# Patient Record
Sex: FEMALE | Race: BLACK OR AFRICAN AMERICAN | NOT HISPANIC OR LATINO | Employment: FULL TIME | URBAN - METROPOLITAN AREA
[De-identification: names, ages, dates, MRNs, and addresses within clinical notes are randomized per-mention and may not be internally consistent; named-entity substitution may affect disease eponyms.]

---

## 2018-10-03 ENCOUNTER — OFFICE VISIT (OUTPATIENT)
Dept: OBSTETRICS AND GYNECOLOGY | Facility: CLINIC | Age: 43
End: 2018-10-03
Attending: OBSTETRICS & GYNECOLOGY
Payer: COMMERCIAL

## 2018-10-03 VITALS
SYSTOLIC BLOOD PRESSURE: 100 MMHG | WEIGHT: 202.63 LBS | DIASTOLIC BLOOD PRESSURE: 68 MMHG | HEIGHT: 68 IN | BODY MASS INDEX: 30.71 KG/M2

## 2018-10-03 DIAGNOSIS — Z01.419 WELL FEMALE EXAM WITH ROUTINE GYNECOLOGICAL EXAM: Primary | ICD-10-CM

## 2018-10-03 PROCEDURE — 88175 CYTOPATH C/V AUTO FLUID REDO: CPT

## 2018-10-03 PROCEDURE — 99386 PREV VISIT NEW AGE 40-64: CPT | Mod: S$GLB,,, | Performed by: OBSTETRICS & GYNECOLOGY

## 2018-10-03 PROCEDURE — 99999 PR PBB SHADOW E&M-EST. PATIENT-LVL III: CPT | Mod: PBBFAC,,, | Performed by: OBSTETRICS & GYNECOLOGY

## 2018-10-03 RX ORDER — GABAPENTIN 600 MG/1
600 TABLET ORAL 3 TIMES DAILY
COMMUNITY
End: 2021-05-25

## 2018-10-03 RX ORDER — CHOLESTYRAMINE 4 G/4.8G
POWDER, FOR SUSPENSION ORAL
COMMUNITY
End: 2019-03-08

## 2018-10-03 RX ORDER — TERCONAZOLE 8 MG/G
1 CREAM VAGINAL NIGHTLY
Qty: 20 G | Refills: 0 | Status: SHIPPED | OUTPATIENT
Start: 2018-10-03 | End: 2018-10-10

## 2018-10-03 NOTE — PROGRESS NOTES
SUBJECTIVE:   43 y.o. female   for routine gyn exam. No LMP recorded. Patient is not currently having periods (Reason: Birth Control)..  She has no unusual complaints.  Needs Nexplanon replaced next month.          Past Medical History:   Diagnosis Date    Ankle pain     Low back pain      Past Surgical History:   Procedure Laterality Date    CHOLECYSTECTOMY       Social History     Socioeconomic History    Marital status: Single     Spouse name: Not on file    Number of children: Not on file    Years of education: Not on file    Highest education level: Not on file   Social Needs    Financial resource strain: Not on file    Food insecurity - worry: Not on file    Food insecurity - inability: Not on file    Transportation needs - medical: Not on file    Transportation needs - non-medical: Not on file   Occupational History    Not on file   Tobacco Use    Smoking status: Never Smoker    Smokeless tobacco: Never Used   Substance and Sexual Activity    Alcohol use: Yes     Comment: social    Drug use: No    Sexual activity: Not Currently     Birth control/protection: Implant     Comment: Implant placement-2015   Other Topics Concern    Not on file   Social History Narrative    Not on file     Family History   Problem Relation Age of Onset    Breast cancer Neg Hx     Ovarian cancer Neg Hx     Colon cancer Neg Hx      OB History    Para Term  AB Living   2 2       2   SAB TAB Ectopic Multiple Live Births           2      # Outcome Date GA Lbr Dileep/2nd Weight Sex Delivery Anes PTL Lv   2 Para            1 Para                     Current Outpatient Medications   Medication Sig Dispense Refill    cholestyramine-aspartame (CHOLESTYRAMINE LIGHT) 4 gram PwPk Take by mouth.      gabapentin (NEURONTIN) 600 MG tablet Take 600 mg by mouth 3 (three) times daily.      naproxen sodium (ANAPROX) 220 MG tablet Take 220 mg by mouth every 12 (twelve) hours.      terconazole (TERAZOL 3)  "0.8 % vaginal cream Place 1 applicator vaginally every evening. for 7 days 20 g 0     No current facility-administered medications for this visit.      Allergies: Patient has no known allergies.     ROS:  Constitutional: no weight loss, weight gain, fever, fatigue  Eyes:  No vision changes, glasses/contacts  ENT/Mouth: No ulcers, sinus problems, ears ringing, headache  Cardiovascular: No inability to lie flat, chest pain, exercise intolerance, swelling, heart palpitations  Respiratory: No wheezing, coughing blood, shortness of breath, or cough  Gastrointestinal: No diarrhea, bloody stool, nausea/vomiting, constipation, gas, hemorrhoids  Genitourinary: No blood in urine, painful urination, urgency of urination, frequency of urination, incomplete emptying, incontinence, abnormal bleeding, painful periods, heavy periods, vaginal discharge, vaginal odor, painful intercourse, sexual problems, bleeding after intercourse.  Musculoskeletal: No muscle weakness  Skin/Breast: No painful breasts, nipple discharge, masses, rash, ulcers  Neurological: No passing out, seizures, numbness, headache  Endocrine: No diabetes, hypothyroid, hyperthyroid, hot flashes, hair loss, abnormal hair growth, ance  Psychiatric: No depression, crying  Hematologic: No bruises, bleeding, swollen lymph nodes, anemia.      OBJECTIVE:   The patient appears well, alert, oriented x 3, in no distress.  /68 (BP Location: Left arm, Patient Position: Sitting, BP Method: Large (Manual))   Ht 5' 8" (1.727 m)   Wt 91.9 kg (202 lb 9.6 oz)   BMI 30.81 kg/m²   NECK: no thyromegaly, trachea midline  SKIN: no acne, striae, hirsutism  CHEST: CTAB  CV: RRR  BREAST EXAM: breasts appear normal, no suspicious masses, no skin or nipple changes or axillary nodes  ABDOMEN: no hernias, masses, or hepatosplenomegaly  GENITALIA: normal external genitalia, no erythema, white discharge  URETHRA: normal urethra, normal urethral meatus  VAGINA: Normal  CERVIX: no lesions " or cervical motion tenderness  UTERUS: normal size, contour, position, consistency, mobility, non-tender  ADNEXA: no mass, fullness, tenderness      ASSESSMENT:   1. Well female exam with routine gynecological exam  Liquid-based pap smear, screening       PLAN:   Orders Placed This Encounter    Liquid-based pap smear, screening    terconazole (TERAZOL 3) 0.8 % vaginal cream     Discussed white d/c c/w candidiasis.  Discussed Depo Provera vs Nexplanon.    Return to clinic in 1 year

## 2018-10-04 ENCOUNTER — TELEPHONE (OUTPATIENT)
Dept: OBSTETRICS AND GYNECOLOGY | Facility: CLINIC | Age: 43
End: 2018-10-04

## 2018-10-04 NOTE — TELEPHONE ENCOUNTER
----- Message from Melody Dominguez sent at 10/4/2018  2:48 PM CDT -----  Contact: pt   Name of Who is Calling: CHUCHO CHICAS [[id number]]    What is the request in detail: Patient is returning a call....Please contact to further discuss and advise      Can the clinic reply by [facility]: No   What Number to Call Back if not in [facility]: [phone].

## 2018-10-04 NOTE — TELEPHONE ENCOUNTER
----- Message from Gabriela Celeste sent at 10/4/2018 12:12 PM CDT -----            Name of Who is Calling: CHUCHO CHICAS [[id number]]      What is the request in detail: PT is returning a call from the office. She states she received a voicemail stating she needed to schedule an appt, but she doesn't know what for. Please call to discuss.      Can the clinic reply by [facility]: yes      What Number to Call Back if not in [facility]: [phone]

## 2018-10-08 ENCOUNTER — TELEPHONE (OUTPATIENT)
Dept: OBSTETRICS AND GYNECOLOGY | Facility: CLINIC | Age: 43
End: 2018-10-08

## 2018-10-08 NOTE — TELEPHONE ENCOUNTER
Patients reports she received a 3 treatment of Terazol cream , but is feeling better. Advised to call the office if odor re occur.

## 2018-10-08 NOTE — TELEPHONE ENCOUNTER
----- Message from Corina Castro sent at 10/8/2018  1:08 PM CDT -----  Contact: pt   Can the clinic reply in [facility]: no       Please refill the medication(s) listed below. Please call the patient when the prescription(s) is ready for  at the phone number [phone]    Medication #1:terconazole (TERAZOL 3) 0.8 % vaginal cream    Medication #2:       Preferred Pharmacy:[id number] - [place] - [address]

## 2018-11-15 ENCOUNTER — PROCEDURE VISIT (OUTPATIENT)
Dept: OBSTETRICS AND GYNECOLOGY | Facility: CLINIC | Age: 43
End: 2018-11-15
Payer: COMMERCIAL

## 2018-11-15 ENCOUNTER — TELEPHONE (OUTPATIENT)
Dept: OBSTETRICS AND GYNECOLOGY | Facility: CLINIC | Age: 43
End: 2018-11-15

## 2018-11-15 VITALS
DIASTOLIC BLOOD PRESSURE: 88 MMHG | BODY MASS INDEX: 31.37 KG/M2 | WEIGHT: 207 LBS | HEIGHT: 68 IN | SYSTOLIC BLOOD PRESSURE: 128 MMHG

## 2018-11-15 DIAGNOSIS — Z30.9 ENCOUNTER FOR CONTRACEPTIVE MANAGEMENT, UNSPECIFIED TYPE: Primary | ICD-10-CM

## 2018-11-15 DIAGNOSIS — Z30.46 NEXPLANON REMOVAL: Primary | ICD-10-CM

## 2018-11-15 PROCEDURE — 11982 REMOVE DRUG IMPLANT DEVICE: CPT | Mod: S$GLB,,, | Performed by: OBSTETRICS & GYNECOLOGY

## 2018-11-15 RX ORDER — LIDOCAINE HYDROCHLORIDE 38.8 MG/G
CREAM TOPICAL
Refills: 3 | COMMUNITY
Start: 2018-09-14 | End: 2021-02-19

## 2018-11-15 RX ORDER — CHOLESTYRAMINE 4 G/9G
POWDER, FOR SUSPENSION ORAL
Refills: 2 | COMMUNITY
Start: 2018-11-10 | End: 2021-02-19

## 2018-11-15 RX ORDER — AMITRIPTYLINE HYDROCHLORIDE 25 MG/1
TABLET, FILM COATED ORAL
COMMUNITY
Start: 2018-10-11 | End: 2019-03-08

## 2018-11-15 RX ORDER — CAMPHOR, LIDOCAINE, AND METHYL SALICYLATE 2.5; 2; 4 MG/100MG; MG/100MG; MG/100MG
PATCH TOPICAL
Refills: 3 | COMMUNITY
Start: 2018-09-14 | End: 2021-02-19

## 2018-11-15 NOTE — PROCEDURES
Removal of Nexplanon Device  Date/Time: 11/15/2018 2:54 PM  Performed by: Lucas Webber Jr., MD  Authorized by: Lucas Webber Jr., MD   Preparation: Patient was prepped and draped in the usual sterile fashion.  Local anesthesia used: yes    Anesthesia:  Local anesthesia used: yes  Local Anesthetic: lidocaine 1% without epinephrine  Anesthetic total: 1.5 mL    Sedation:  Patient sedated: no    Patient tolerance: Patient tolerated the procedure well with no immediate complications          Gloria Chew is a 43 y.o. female  presents for implanon removal and Nexplanon placement.  It is time to have the Implanon replaced.  UPT negative.    Implanon palpated through the skin.  Area wiped with rubbing alcohol.  1.5  cc of 1% lidocaine without epinephrine was injected in the subcutaneous tissue.  The area was prepped with betadine.  A stabbing incision was made with an 11 blade scalpel.  The Implanon was identified and grasped with curved hemostat.  It was removed intact.  A bandage was placed over the incision site.

## 2018-11-16 ENCOUNTER — HOSPITAL ENCOUNTER (EMERGENCY)
Facility: OTHER | Age: 43
Discharge: HOME | End: 2018-11-16
Attending: EMERGENCY MEDICINE
Payer: COMMERCIAL

## 2018-11-16 VITALS
SYSTOLIC BLOOD PRESSURE: 126 MMHG | WEIGHT: 205 LBS | RESPIRATION RATE: 18 BRPM | TEMPERATURE: 98 F | HEART RATE: 73 BPM | BODY MASS INDEX: 31.07 KG/M2 | OXYGEN SATURATION: 100 % | DIASTOLIC BLOOD PRESSURE: 71 MMHG | HEIGHT: 68 IN

## 2018-11-16 DIAGNOSIS — N39.0 URINARY TRACT INFECTION WITHOUT HEMATURIA, SITE UNSPECIFIED: ICD-10-CM

## 2018-11-16 DIAGNOSIS — D25.9 UTERINE LEIOMYOMA, UNSPECIFIED LOCATION: ICD-10-CM

## 2018-11-16 DIAGNOSIS — R93.2 ABNORMAL ULTRASOUND OF LIVER: Primary | ICD-10-CM

## 2018-11-16 LAB
ALBUMIN SERPL BCP-MCNC: 3.7 G/DL
ALP SERPL-CCNC: 71 U/L
ALT SERPL W/O P-5'-P-CCNC: 26 U/L
ANION GAP SERPL CALC-SCNC: 9 MMOL/L
AST SERPL-CCNC: 28 U/L
B-HCG UR QL: NEGATIVE
BACTERIA #/AREA URNS HPF: ABNORMAL /HPF
BASOPHILS # BLD AUTO: 0.02 K/UL
BASOPHILS NFR BLD: 0.3 %
BILIRUB SERPL-MCNC: 0.8 MG/DL
BILIRUB UR QL STRIP: NEGATIVE
BUN SERPL-MCNC: 11 MG/DL
CALCIUM SERPL-MCNC: 9.3 MG/DL
CHLORIDE SERPL-SCNC: 106 MMOL/L
CLARITY UR: CLEAR
CO2 SERPL-SCNC: 24 MMOL/L
COLOR UR: YELLOW
CREAT SERPL-MCNC: 0.9 MG/DL
CTP QC/QA: YES
DIFFERENTIAL METHOD: NORMAL
EOSINOPHIL # BLD AUTO: 0.4 K/UL
EOSINOPHIL NFR BLD: 6.4 %
ERYTHROCYTE [DISTWIDTH] IN BLOOD BY AUTOMATED COUNT: 12.9 %
EST. GFR  (AFRICAN AMERICAN): >60 ML/MIN/1.73 M^2
EST. GFR  (NON AFRICAN AMERICAN): >60 ML/MIN/1.73 M^2
GLUCOSE SERPL-MCNC: 89 MG/DL
GLUCOSE UR QL STRIP: NEGATIVE
HCT VFR BLD AUTO: 39.7 %
HGB BLD-MCNC: 13.3 G/DL
HGB UR QL STRIP: ABNORMAL
KETONES UR QL STRIP: NEGATIVE
LEUKOCYTE ESTERASE UR QL STRIP: ABNORMAL
LIPASE SERPL-CCNC: 11 U/L
LYMPHOCYTES # BLD AUTO: 1.9 K/UL
LYMPHOCYTES NFR BLD: 31.7 %
MCH RBC QN AUTO: 30.6 PG
MCHC RBC AUTO-ENTMCNC: 33.5 G/DL
MCV RBC AUTO: 92 FL
MICROSCOPIC COMMENT: ABNORMAL
MONOCYTES # BLD AUTO: 0.3 K/UL
MONOCYTES NFR BLD: 5.1 %
NEUTROPHILS # BLD AUTO: 3.4 K/UL
NEUTROPHILS NFR BLD: 56.5 %
NITRITE UR QL STRIP: NEGATIVE
PH UR STRIP: 6 [PH] (ref 5–8)
PLATELET # BLD AUTO: 292 K/UL
PMV BLD AUTO: 9.4 FL
POTASSIUM SERPL-SCNC: 4.8 MMOL/L
PROT SERPL-MCNC: 7.9 G/DL
PROT UR QL STRIP: NEGATIVE
RBC # BLD AUTO: 4.34 M/UL
RBC #/AREA URNS HPF: 2 /HPF (ref 0–4)
SODIUM SERPL-SCNC: 139 MMOL/L
SP GR UR STRIP: >=1.03 (ref 1–1.03)
SQUAMOUS #/AREA URNS HPF: 4 /HPF
URN SPEC COLLECT METH UR: ABNORMAL
UROBILINOGEN UR STRIP-ACNC: NEGATIVE EU/DL
WBC # BLD AUTO: 6.06 K/UL
WBC #/AREA URNS HPF: 13 /HPF (ref 0–5)
WBC CLUMPS URNS QL MICRO: ABNORMAL

## 2018-11-16 PROCEDURE — 87086 URINE CULTURE/COLONY COUNT: CPT

## 2018-11-16 PROCEDURE — 83690 ASSAY OF LIPASE: CPT

## 2018-11-16 PROCEDURE — 99284 EMERGENCY DEPT VISIT MOD MDM: CPT | Mod: 25

## 2018-11-16 PROCEDURE — 63600175 PHARM REV CODE 636 W HCPCS: Performed by: PHYSICIAN ASSISTANT

## 2018-11-16 PROCEDURE — 81000 URINALYSIS NONAUTO W/SCOPE: CPT

## 2018-11-16 PROCEDURE — 80053 COMPREHEN METABOLIC PANEL: CPT

## 2018-11-16 PROCEDURE — 96374 THER/PROPH/DIAG INJ IV PUSH: CPT

## 2018-11-16 PROCEDURE — 85025 COMPLETE CBC W/AUTO DIFF WBC: CPT

## 2018-11-16 PROCEDURE — 81025 URINE PREGNANCY TEST: CPT | Performed by: EMERGENCY MEDICINE

## 2018-11-16 RX ORDER — KETOROLAC TROMETHAMINE 30 MG/ML
15 INJECTION, SOLUTION INTRAMUSCULAR; INTRAVENOUS
Status: COMPLETED | OUTPATIENT
Start: 2018-11-16 | End: 2018-11-16

## 2018-11-16 RX ORDER — CEPHALEXIN 500 MG/1
500 CAPSULE ORAL 4 TIMES DAILY
Qty: 28 CAPSULE | Refills: 0 | Status: SHIPPED | OUTPATIENT
Start: 2018-11-16 | End: 2018-11-23

## 2018-11-16 RX ADMIN — KETOROLAC TROMETHAMINE 15 MG: 30 INJECTION, SOLUTION INTRAMUSCULAR at 09:11

## 2018-11-16 NOTE — ED PROVIDER NOTES
Encounter Date: 11/16/2018       History     Chief Complaint   Patient presents with    Pelvic Pain     pelvic pain x 1 wk off and on. worse this morning. had nexplanon removed yesterday. denies discharge. no periords while on nexplanon     Patient is 43 year old female history of known uterine fibroids who presents with complaints abdominal pain that has been present for 1 week prior to arrival.  She reports pain has been progressively worsening.  She reports that she had a pelvic ultrasound about 2 weeks ago that revealed about 7 fibroids but reports that she has not had a chance to follow up with her OBGYN yet.  She reports normal bowel movements with last passage of stool today.  Reports normal urination with no dysuria hematuria or vaginal discharge. Reports no nausea or vomiting fever or chills skin rashes abdominal trauma or injury. She has been taking occasional doses of ibuprofen but reports she has not taken anything yesterday or today.  She has no associated bleeding.  She is currently unaccompanied in the ER.          Review of patient's allergies indicates:  No Known Allergies  Past Medical History:   Diagnosis Date    Ankle pain     Low back pain      Past Surgical History:   Procedure Laterality Date    CHOLECYSTECTOMY      INSERTION OF CONTRACEPTIVE CAPSULE  2012 / 2015    NEXPLANON REMOVED     Family History   Problem Relation Age of Onset    Breast cancer Neg Hx     Ovarian cancer Neg Hx     Colon cancer Neg Hx      Social History     Tobacco Use    Smoking status: Never Smoker    Smokeless tobacco: Never Used   Substance Use Topics    Alcohol use: Yes     Comment: social    Drug use: No     Review of Systems   Constitutional: Negative for fever.   HENT: Negative for sore throat.    Respiratory: Negative for shortness of breath.    Cardiovascular: Negative for chest pain.   Gastrointestinal: Positive for abdominal pain. Negative for nausea.   Genitourinary: Negative for dysuria.    Musculoskeletal: Negative for back pain.   Skin: Negative for rash.   Neurological: Negative for weakness.   Hematological: Does not bruise/bleed easily.       Physical Exam     Initial Vitals [11/16/18 0847]   BP Pulse Resp Temp SpO2   135/79 79 18 98.4 °F (36.9 °C) 98 %      MAP       --         Physical Exam    Nursing note and vitals reviewed.  Constitutional: She appears well-developed and well-nourished. She is not diaphoretic. No distress.   Healthy-appearing female in no acute distress but does appear uncomfortable on interview and exam especially when transitioning from seated position in supine position.  She makes good eye contact, speaks in clear full sentences and ambulates with ease.   HENT:   Head: Normocephalic and atraumatic.   Eyes: Conjunctivae and EOM are normal. Pupils are equal, round, and reactive to light. Right eye exhibits no discharge. Left eye exhibits no discharge. No scleral icterus.   Neck: Normal range of motion.   Cardiovascular: Normal rate, regular rhythm, normal heart sounds and intact distal pulses. Exam reveals no gallop and no friction rub.    No murmur heard.  Pulmonary/Chest: Breath sounds normal. She has no wheezes. She has no rhonchi. She has no rales.   Clear lungs auscultation bilaterally   Abdominal: Soft. Bowel sounds are normal. There is tenderness. There is no rebound and no guarding.   Generalized lower abdominal tenderness to palpation with significant right upper quadrant tenderness to deep palpation. No clear Diez sign. No abdominal distension adventitious bowel sounds or overlying skin changes.   Musculoskeletal: Normal range of motion. She exhibits no edema or tenderness.   Lymphadenopathy:     She has no cervical adenopathy.   Neurological: She is alert and oriented to person, place, and time. She has normal strength. GCS score is 15. GCS eye subscore is 4. GCS verbal subscore is 5. GCS motor subscore is 6.   Skin: Skin is warm. Capillary refill takes less  than 2 seconds. No rash and no abscess noted. No erythema.   Psychiatric: She has a normal mood and affect. Her behavior is normal. Thought content normal.         ED Course   Procedures  Labs Reviewed   URINALYSIS, REFLEX TO URINE CULTURE   URINALYSIS MICROSCOPIC   POCT URINE PREGNANCY         Labs Reviewed   URINALYSIS, REFLEX TO URINE CULTURE - Abnormal; Notable for the following components:       Result Value    Specific Gravity, UA >=1.030 (*)     Occult Blood UA 1+ (*)     Leukocytes, UA Trace (*)     All other components within normal limits    Narrative:     Preferred Collection Type->Urine, Clean Catch   URINALYSIS MICROSCOPIC - Abnormal; Notable for the following components:    WBC, UA 13 (*)     WBC Clumps, UA Occasional (*)     Bacteria, UA Few (*)     All other components within normal limits    Narrative:     Preferred Collection Type->Urine, Clean Catch   CULTURE, URINE   CULTURE, URINE   CBC W/ AUTO DIFFERENTIAL   COMPREHENSIVE METABOLIC PANEL   LIPASE   POCT URINE PREGNANCY     Imaging Results          US Pelvis Comp with Transvag NON-OB (xpd (Final result)  Result time 11/16/18 11:37:14    Final result by Bev Robertson MD (11/16/18 11:37:14)                 Impression:      Several uterine fibroids.    If patient is symptomatic in this region, in 8-12 week ultrasound follow-up could be performed.  If patient is asymptomatic, no follow-up is necessary.      Electronically signed by: Bev Roebrtson MD  Date:    11/16/2018  Time:    11:37             Narrative:    EXAMINATION:  US PELVIS COMP WITH TRANSVAG NON-OB (XPD)    CLINICAL HISTORY:  abdomina pain;    TECHNIQUE:  Transabdominal sonography of the pelvis was performed, followed by transvaginal sonography to better evaluate the uterus and ovaries.    COMPARISON:  None.    FINDINGS:  The uterus is normal in size and measures 7 x 5.6 x 6 cm.  The endometrial stripe is normal and measures 6mm.  There are 3 uterine fibroids.  Along the posterior  body of the uterus is a subserosal 2 x 2.7 x 2.4 cm fibroid, along the posterior body of the uterus is a 2.7 x 2.4 x 2.6 cm subserosal fibroid and there is a 3.4 by 2.4 x 2.1 cm intramural fibroid along the anterior body of the uterus.    The right ovary measures 2.9 x 1.8 x 1.1 cm.   The left ovary measures 5.4 x 2.6 x 3.5cm.  The left ovary contains a 4 cm simple cyst.  There is appropriate flow in the ovaries.                               US Abdomen Limited (Final result)     Abnormal  Result time 11/16/18 11:13:43    Final result by Blaze Bravo MD (11/16/18 11:13:43)                 Impression:      3.6 cm hyperechoic lesion in the left hepatic lobe.  This is thought to likely reflect a hemangioma, however further characterization is suggested with a triple phase liver protocol CT or MRI.    Increased periportal echogenicity.  This is a nonspecific finding, but could indicate underlying hepatitis.  Correlation with laboratory values.    Prior cholecystectomy.    This report was flagged in Epic as abnormal.      Electronically signed by: Blaze Bravo MD  Date:    11/16/2018  Time:    11:13             Narrative:    EXAMINATION:  US ABDOMEN LIMITED    CLINICAL HISTORY:  RUQ pain;    TECHNIQUE:  Limited ultrasound of the right upper quadrant of the abdomen (including pancreas, liver, gallbladder, common bile duct, and right kidney) was performed.    COMPARISON:  None.    FINDINGS:  Liver: Mildly prominent size measuring 20 cm.  3.6 cm well-circumscribed slightly hyperechoic lesion along the periphery of the inferior left hepatic lobe.  No significant mass effect.  No additional focal hepatic lesions identified elsewhere.  Increased periportal echogenicity.    Gallbladder: Prior cholecystectomy    Biliary system: The common duct is not dilated, measuring 3 mm.  No intrahepatic ductal dilatation.    Pancreas: Visualized portions are unremarkable.    Right kidney: Normal in size with no hydronephrosis,  measuring 10 cm.    Miscellaneous: No upper abdominal ascites.                                     Medical Decision Making:   ED Management:  Urgent evaluation a 43-year-old female who presents with complaints of generalized abdominal pain without clear etiology at this time.  She makes good eye contact, speaks in clear full sentences and ambulates with ease.  Right upper quadrant tenderness to deep palpation without acute peritoneal signs on exam.  Also with generalized lower abdominal tenderness to palpation nose specific adnexal tenderness.  Right upper quadrant and pelvic ultrasounds pending as well as diagnostic labs.  Will give dose of Toradol and continue to follow.    Update:  Pelvic ultrasound reveals uterine fibroids.  Evidence of UTI on urinalysis will cover with Keflex urine culture is sent.  Right upper quadrant ultrasound reveals absent gallbladder with 3.6 cm liver lesion most consistent with hemangioma however further imaging is recommended which can be obtained in the outpatient setting.  There is no evidence of elevated LFTs on CMP.  Lipase is within normal range.  Anti-inflammatories improved patient's pain throughout ED stay.  Discussed case with attending MD who agrees that urgent GI or hepatology consult not felt warranted. She is felt safe for discharge with instruction on follow-up information.  Will have her follow up with Gastroenterology for liver evaluation, OBGYN for fibroid evaluation and primary care provider for urinalysis recheck after antibiotics.  She is educated on ED return precautions verbalized understanding.  She is stable for discharge.  Other:   I have discussed this case with another health care provider.       <> Summary of the Discussion: Richy                       Clinical Impression:   The primary encounter diagnosis was Abnormal ultrasound of liver. Diagnoses of Uterine leiomyoma, unspecified location and Urinary tract infection without hematuria, site unspecified  were also pertinent to this visit.                             Whit Escobar PA-C  11/16/18 2677

## 2018-11-16 NOTE — ED TRIAGE NOTES
Pt c/o pelvic pain that radiates to mitchell back area for the past week. She denies vag discharge, denies any urinary s/s. Denies N/V/D

## 2018-11-17 LAB
BACTERIA UR CULT: NORMAL
BACTERIA UR CULT: NORMAL

## 2018-11-26 ENCOUNTER — TELEPHONE (OUTPATIENT)
Dept: OBSTETRICS AND GYNECOLOGY | Facility: CLINIC | Age: 43
End: 2018-11-26

## 2018-11-26 ENCOUNTER — HOSPITAL ENCOUNTER (EMERGENCY)
Facility: OTHER | Age: 43
Discharge: HOME | End: 2018-11-26
Attending: EMERGENCY MEDICINE
Payer: COMMERCIAL

## 2018-11-26 VITALS
WEIGHT: 205 LBS | DIASTOLIC BLOOD PRESSURE: 76 MMHG | SYSTOLIC BLOOD PRESSURE: 129 MMHG | OXYGEN SATURATION: 100 % | BODY MASS INDEX: 31.07 KG/M2 | HEART RATE: 60 BPM | RESPIRATION RATE: 16 BRPM | TEMPERATURE: 97 F | HEIGHT: 68 IN

## 2018-11-26 DIAGNOSIS — D21.9 FIBROIDS: ICD-10-CM

## 2018-11-26 DIAGNOSIS — R10.2 PELVIC PAIN: Primary | ICD-10-CM

## 2018-11-26 LAB
B-HCG UR QL: NEGATIVE
BACTERIA #/AREA URNS HPF: ABNORMAL /HPF
BASOPHILS # BLD AUTO: 0.02 K/UL
BASOPHILS NFR BLD: 0.3 %
BILIRUB UR QL STRIP: NEGATIVE
CLARITY UR: CLEAR
COLOR UR: YELLOW
CTP QC/QA: YES
DIFFERENTIAL METHOD: ABNORMAL
EOSINOPHIL # BLD AUTO: 0.3 K/UL
EOSINOPHIL NFR BLD: 5.4 %
ERYTHROCYTE [DISTWIDTH] IN BLOOD BY AUTOMATED COUNT: 12.8 %
GLUCOSE UR QL STRIP: NEGATIVE
HCT VFR BLD AUTO: 37.2 %
HGB BLD-MCNC: 11.9 G/DL
HGB UR QL STRIP: NEGATIVE
KETONES UR QL STRIP: NEGATIVE
LEUKOCYTE ESTERASE UR QL STRIP: ABNORMAL
LYMPHOCYTES # BLD AUTO: 2.3 K/UL
LYMPHOCYTES NFR BLD: 37.3 %
MCH RBC QN AUTO: 30.1 PG
MCHC RBC AUTO-ENTMCNC: 32 G/DL
MCV RBC AUTO: 94 FL
MICROSCOPIC COMMENT: ABNORMAL
MONOCYTES # BLD AUTO: 0.4 K/UL
MONOCYTES NFR BLD: 5.9 %
NEUTROPHILS # BLD AUTO: 3.1 K/UL
NEUTROPHILS NFR BLD: 50.9 %
NITRITE UR QL STRIP: NEGATIVE
PH UR STRIP: 6 [PH] (ref 5–8)
PLATELET # BLD AUTO: 285 K/UL
PMV BLD AUTO: 9.6 FL
PROT UR QL STRIP: NEGATIVE
RBC # BLD AUTO: 3.95 M/UL
RBC #/AREA URNS HPF: 0 /HPF (ref 0–4)
SP GR UR STRIP: >=1.03 (ref 1–1.03)
SQUAMOUS #/AREA URNS HPF: 10 /HPF
URN SPEC COLLECT METH UR: ABNORMAL
UROBILINOGEN UR STRIP-ACNC: NEGATIVE EU/DL
WBC # BLD AUTO: 6.11 K/UL
WBC #/AREA URNS HPF: 8 /HPF (ref 0–5)
WBC CLUMPS URNS QL MICRO: ABNORMAL
YEAST URNS QL MICRO: ABNORMAL

## 2018-11-26 PROCEDURE — 81025 URINE PREGNANCY TEST: CPT | Performed by: EMERGENCY MEDICINE

## 2018-11-26 PROCEDURE — 96372 THER/PROPH/DIAG INJ SC/IM: CPT

## 2018-11-26 PROCEDURE — 63600175 PHARM REV CODE 636 W HCPCS: Performed by: EMERGENCY MEDICINE

## 2018-11-26 PROCEDURE — 81000 URINALYSIS NONAUTO W/SCOPE: CPT

## 2018-11-26 PROCEDURE — 99284 EMERGENCY DEPT VISIT MOD MDM: CPT | Mod: 25

## 2018-11-26 PROCEDURE — 85025 COMPLETE CBC W/AUTO DIFF WBC: CPT

## 2018-11-26 PROCEDURE — 25000003 PHARM REV CODE 250: Performed by: EMERGENCY MEDICINE

## 2018-11-26 RX ORDER — ACETAMINOPHEN 500 MG
1000 TABLET ORAL
Status: COMPLETED | OUTPATIENT
Start: 2018-11-26 | End: 2018-11-26

## 2018-11-26 RX ORDER — KETOROLAC TROMETHAMINE 30 MG/ML
15 INJECTION, SOLUTION INTRAMUSCULAR; INTRAVENOUS
Status: DISCONTINUED | OUTPATIENT
Start: 2018-11-26 | End: 2018-11-26

## 2018-11-26 RX ORDER — IBUPROFEN 400 MG/1
800 TABLET ORAL
Status: DISCONTINUED | OUTPATIENT
Start: 2018-11-26 | End: 2018-11-26

## 2018-11-26 RX ORDER — KETOROLAC TROMETHAMINE 30 MG/ML
30 INJECTION, SOLUTION INTRAMUSCULAR; INTRAVENOUS
Status: COMPLETED | OUTPATIENT
Start: 2018-11-26 | End: 2018-11-26

## 2018-11-26 RX ORDER — HYDROCODONE BITARTRATE AND ACETAMINOPHEN 5; 325 MG/1; MG/1
1 TABLET ORAL EVERY 4 HOURS PRN
Qty: 8 TABLET | Refills: 0 | Status: SHIPPED | OUTPATIENT
Start: 2018-11-26 | End: 2018-12-06

## 2018-11-26 RX ORDER — IBUPROFEN 600 MG/1
600 TABLET ORAL EVERY 6 HOURS PRN
Qty: 20 TABLET | Refills: 0 | Status: ON HOLD | OUTPATIENT
Start: 2018-11-26 | End: 2019-03-13 | Stop reason: SDUPTHER

## 2018-11-26 RX ADMIN — KETOROLAC TROMETHAMINE 30 MG: 30 INJECTION, SOLUTION INTRAMUSCULAR at 06:11

## 2018-11-26 RX ADMIN — ACETAMINOPHEN 1000 MG: 500 TABLET, FILM COATED ORAL at 08:11

## 2018-11-26 NOTE — ED NOTES
Assumed care of pt, received BSSR from ANNALISE Garnica. Pt resting comfortably in stretcher, awakens spontaneously, states she is feeling better after receiving Toradol shot, AAOx4, calm, cooperative, responding appropriately to questions, speaking in full sentences, respirations even and unlabored, skin warm and dry, pt appears in NAD. Pt placed on continuous pulse ox and BP monitoring. Bed locked and in lowest position, call light in reach. WCTM.

## 2018-11-26 NOTE — TELEPHONE ENCOUNTER
Called to discuss scheduling appointment per Elle Guy MD from the ED. Left a message to call a schedule an appointment

## 2018-11-26 NOTE — TELEPHONE ENCOUNTER
----- Message from Elle Guy MD sent at 11/26/2018  7:54 AM CST -----  Regarding: Appointment   Good morning,     This patient presented to the ED this am with complaint of pain from fibroids. She was discharged and told to follow up with her obgyn outpatient. She said she tried to make an appointment but was unable. Could she be scheduled for an appointment in the next two weeks if possible?     Thank you!    Elle Guy MD-[id number]

## 2018-11-26 NOTE — ED PROVIDER NOTES
"Encounter Date: 11/26/2018    SCRIBE #1 NOTE: I, Ayan Espinoza, am scribing for, and in the presence of, Dr. Eli.       History     Chief Complaint   Patient presents with    Pelvic Pain     and lower back pain from fibroids, does not see her OBGYN untill 12/18, bleeding off and on since last seen here in ED     Seen by provider: 6:07 AM    Patient is a 43 y.o. female who presents to the ED with complaint of pelvic pain. Patient reports she was recently diagnosed with fibroids and states her current pain feels like the pain from her fibroids. She was seen here in the ED 10 days ago for this pain and states "I'm having the same pain that I had before." Her current pain began three days ago and she states "this morning I just couldn't deal with it anymore," prompting her visit to the ED. She reports being told to return to the ED if her pain returned. She has an appointment scheduled with her OB/GYN in 25 days, which was the earliest available appointment she could get. She describes her pain as "cramping and ellen" pain. She reports associated lower back pain. She had vaginal bleeding two days ago and light bleeding yesterday, which has since resolved. She has been taking aleve, and occasionally gabapentin with minimal relief. She last took aleve last night. She denies fevers, nausea, vomiting, diarrhea or urinary symptoms.       The history is provided by the patient.     Review of patient's allergies indicates:  No Known Allergies  Past Medical History:   Diagnosis Date    Ankle pain     Low back pain      Past Surgical History:   Procedure Laterality Date    CHOLECYSTECTOMY      INSERTION OF CONTRACEPTIVE CAPSULE  2012 / 2015    NEXPLANON REMOVED     Family History   Problem Relation Age of Onset    Breast cancer Neg Hx     Ovarian cancer Neg Hx     Colon cancer Neg Hx      Social History     Tobacco Use    Smoking status: Never Smoker    Smokeless tobacco: Never Used   Substance Use " Topics    Alcohol use: Yes     Comment: social    Drug use: No     Review of Systems   Constitutional: Negative for chills and fever.   HENT: Negative for congestion.    Respiratory: Negative for cough and shortness of breath.    Cardiovascular: Negative for chest pain.   Gastrointestinal: Negative for abdominal pain, diarrhea, nausea and vomiting.   Genitourinary: Positive for pelvic pain. Negative for dysuria, frequency, hematuria, urgency and vaginal bleeding.   Musculoskeletal: Positive for back pain.   Skin: Negative for rash.   Neurological: Negative for weakness and headaches.   Psychiatric/Behavioral: Negative for confusion.       Physical Exam     Initial Vitals [11/26/18 0542]   BP Pulse Resp Temp SpO2   (!) 141/98 70 16 97.9 °F (36.6 °C) 98 %      MAP       --         Physical Exam    Nursing note and vitals reviewed.  Constitutional: She appears well-developed and well-nourished. She is not diaphoretic. No distress.   Patient does not appear to be in any distress.   HENT:   Head: Normocephalic and atraumatic.   Eyes: Conjunctivae and EOM are normal. Pupils are equal, round, and reactive to light.   Neck: Normal range of motion. Neck supple.   Cardiovascular: Normal rate, regular rhythm and normal heart sounds. Exam reveals no gallop and no friction rub.    No murmur heard.  Pulmonary/Chest: Breath sounds normal. No respiratory distress. She has no wheezes. She has no rhonchi. She has no rales.   Abdominal: Soft. Bowel sounds are normal. She exhibits no distension. There is tenderness in the suprapubic area. There is no rebound, no guarding, no tenderness at McBurney's point and negative Diez's sign.   Abdomen is non-peritoneal.    Musculoskeletal: Normal range of motion.   Neurological: She is alert and oriented to person, place, and time.   Skin: Skin is warm and dry. No rash noted. No pallor.   Psychiatric: She has a normal mood and affect. Her behavior is normal. Thought content normal.         ED  Course   Procedures  Labs Reviewed   URINALYSIS, REFLEX TO URINE CULTURE - Abnormal; Notable for the following components:       Result Value    Specific Gravity, UA >=1.030 (*)     Leukocytes, UA Trace (*)     All other components within normal limits    Narrative:     Preferred Collection Type->Urine, Clean Catch   URINALYSIS MICROSCOPIC - Abnormal; Notable for the following components:    WBC, UA 8 (*)     All other components within normal limits    Narrative:     Preferred Collection Type->Urine, Clean Catch   CBC W/ AUTO DIFFERENTIAL - Abnormal; Notable for the following components:    RBC 3.95 (*)     Hemoglobin 11.9 (*)     All other components within normal limits   POCT URINE PREGNANCY          Imaging Results    None          Medical Decision Making:   Clinical Tests:   Lab Tests: Ordered and Reviewed  ED Management:  43-year-old female presents with pelvic pain.  No significant bleeding.  States is similar to her pain in which she was diagnosed with fibroids 10 days ago.  I reviewed those medical records.  The patient's vitals are normal. I do not see any acute abnormality on my physical exam that would necessitate any further imaging.  Will recheck a CBC give analgesia and re-evaluate.    6:55 a.m. on re-evaluation patient states the pain is improving.  Will continue to observe.    On re-evaluation patient feeling better.  I spoke with gynecology team requesting them to call the patient see if there is any sooner appointment that the patient can get in the clinic rather than return to the emergency department for her fibroids.  Discussed this with the patient who agrees to plan of care.  Will discharge home.    Patient discharged home in stable condition. Diagnosis and treatment plan explained to patient. I have answered all questions and the patient is satisfied with the plan of care. The patient demonstrates understanding of the care plan. This is the extent to the patients complaints today here in  the emergency department.            Scribe Attestation:   Scribe #1: I performed the above scribed service and the documentation accurately describes the services I performed. I attest to the accuracy of the note.    Attending Attestation:           Physician Attestation for Scribe:  Physician Attestation Statement for Scribe #1: I, Dr. Eli, reviewed documentation, as scribed by Ayan Espinoza in my presence, and it is both accurate and complete.                    Clinical Impression:     1. Pelvic pain    2. Fibroids                                Mendez Eli, DO  11/26/18 0918

## 2018-12-03 ENCOUNTER — OFFICE VISIT (OUTPATIENT)
Dept: OBSTETRICS AND GYNECOLOGY | Facility: CLINIC | Age: 43
End: 2018-12-03
Attending: OBSTETRICS & GYNECOLOGY
Payer: COMMERCIAL

## 2018-12-03 VITALS
DIASTOLIC BLOOD PRESSURE: 68 MMHG | WEIGHT: 204.56 LBS | SYSTOLIC BLOOD PRESSURE: 100 MMHG | HEIGHT: 68 IN | BODY MASS INDEX: 31 KG/M2

## 2018-12-03 DIAGNOSIS — R10.2 PELVIC PAIN: ICD-10-CM

## 2018-12-03 DIAGNOSIS — D25.9 UTERINE LEIOMYOMA, UNSPECIFIED LOCATION: Primary | ICD-10-CM

## 2018-12-03 LAB
AMORPH CRY UR QL COMP ASSIST: ABNORMAL
BILIRUB UR QL STRIP: NEGATIVE
CLARITY UR REFRACT.AUTO: ABNORMAL
COLOR UR AUTO: YELLOW
GLUCOSE UR QL STRIP: NEGATIVE
HGB UR QL STRIP: NEGATIVE
KETONES UR QL STRIP: NEGATIVE
LEUKOCYTE ESTERASE UR QL STRIP: ABNORMAL
MICROSCOPIC COMMENT: ABNORMAL
NITRITE UR QL STRIP: NEGATIVE
PH UR STRIP: 5 [PH] (ref 5–8)
PROT UR QL STRIP: NEGATIVE
RBC #/AREA URNS AUTO: 5 /HPF (ref 0–4)
SP GR UR STRIP: 1.02 (ref 1–1.03)
SQUAMOUS #/AREA URNS AUTO: 2 /HPF
URN SPEC COLLECT METH UR: ABNORMAL
WBC #/AREA URNS AUTO: 10 /HPF (ref 0–5)

## 2018-12-03 PROCEDURE — 99214 OFFICE O/P EST MOD 30 MIN: CPT | Mod: S$GLB,,, | Performed by: OBSTETRICS & GYNECOLOGY

## 2018-12-03 PROCEDURE — 3008F BODY MASS INDEX DOCD: CPT | Mod: CPTII,S$GLB,, | Performed by: OBSTETRICS & GYNECOLOGY

## 2018-12-03 PROCEDURE — 87086 URINE CULTURE/COLONY COUNT: CPT

## 2018-12-03 PROCEDURE — 81001 URINALYSIS AUTO W/SCOPE: CPT

## 2018-12-03 PROCEDURE — 99999 PR PBB SHADOW E&M-EST. PATIENT-LVL III: CPT | Mod: PBBFAC,,, | Performed by: OBSTETRICS & GYNECOLOGY

## 2018-12-04 LAB — BACTERIA UR CULT: NO GROWTH

## 2018-12-05 ENCOUNTER — OFFICE VISIT (OUTPATIENT)
Dept: PODIATRY | Facility: CLINIC | Age: 43
End: 2018-12-05
Payer: COMMERCIAL

## 2018-12-05 ENCOUNTER — PROCEDURE VISIT (OUTPATIENT)
Dept: OBSTETRICS AND GYNECOLOGY | Facility: CLINIC | Age: 43
End: 2018-12-05
Payer: COMMERCIAL

## 2018-12-05 VITALS
SYSTOLIC BLOOD PRESSURE: 128 MMHG | DIASTOLIC BLOOD PRESSURE: 74 MMHG | BODY MASS INDEX: 29.2 KG/M2 | WEIGHT: 204 LBS | HEIGHT: 70 IN | HEART RATE: 74 BPM

## 2018-12-05 VITALS — SYSTOLIC BLOOD PRESSURE: 104 MMHG | BODY MASS INDEX: 29.82 KG/M2 | DIASTOLIC BLOOD PRESSURE: 70 MMHG | WEIGHT: 205.5 LBS

## 2018-12-05 DIAGNOSIS — N89.8 VAGINA ITCHING: ICD-10-CM

## 2018-12-05 DIAGNOSIS — Z30.017 NEXPLANON INSERTION: Primary | ICD-10-CM

## 2018-12-05 DIAGNOSIS — Z97.5 NEXPLANON IN PLACE: ICD-10-CM

## 2018-12-05 DIAGNOSIS — B35.3 TINEA PEDIS OF BOTH FEET: Primary | ICD-10-CM

## 2018-12-05 LAB
B-HCG UR QL: NEGATIVE
CTP QC/QA: YES

## 2018-12-05 PROCEDURE — 99999 PR PBB SHADOW E&M-EST. PATIENT-LVL III: CPT | Mod: PBBFAC,,, | Performed by: PODIATRIST

## 2018-12-05 PROCEDURE — 81025 URINE PREGNANCY TEST: CPT | Mod: S$GLB,,, | Performed by: NURSE PRACTITIONER

## 2018-12-05 PROCEDURE — 3008F BODY MASS INDEX DOCD: CPT | Mod: CPTII,S$GLB,, | Performed by: PODIATRIST

## 2018-12-05 PROCEDURE — 11981 INSERTION DRUG DLVR IMPLANT: CPT | Mod: S$GLB,,, | Performed by: NURSE PRACTITIONER

## 2018-12-05 PROCEDURE — 99203 OFFICE O/P NEW LOW 30 MIN: CPT | Mod: S$GLB,,, | Performed by: PODIATRIST

## 2018-12-05 RX ORDER — FLUCONAZOLE 200 MG/1
200 TABLET ORAL ONCE
Qty: 3 TABLET | Refills: 1 | Status: SHIPPED | OUTPATIENT
Start: 2018-12-05 | End: 2018-12-05

## 2018-12-05 RX ORDER — CICLOPIROX 7.7 MG/G
GEL TOPICAL 2 TIMES DAILY
Qty: 100 G | Refills: 3 | Status: SHIPPED | OUTPATIENT
Start: 2018-12-05 | End: 2019-03-08

## 2018-12-05 NOTE — PROGRESS NOTES
Subjective:      Patient ID: Gloria Chew is a 43 y.o. female.    Chief Complaint: Foot Problem and Foot Pain    Burning itching, flaking skin between toes both feet.  Gradual onset, worsening over past several weeks, aggravated by increased weight bearing, shoe gear, pressure.  No previous medical treatment.  OTC pain med not helping. Denies trauma, signs infection.    Review of Systems   Constitution: Negative for chills, diaphoresis, fever, malaise/fatigue and night sweats.   Cardiovascular: Negative for claudication, cyanosis, leg swelling and syncope.   Skin: Positive for dry skin, itching and rash. Negative for color change, nail changes, suspicious lesions and unusual hair distribution.   Musculoskeletal: Negative for falls, joint pain, joint swelling, muscle cramps, muscle weakness and stiffness.   Gastrointestinal: Negative for constipation, diarrhea, nausea and vomiting.   Neurological: Negative for brief paralysis, disturbances in coordination, focal weakness, numbness, paresthesias, sensory change and tremors.           Objective:      Physical Exam   Constitutional: She is oriented to person, place, and time. She appears well-developed and well-nourished. She is cooperative. No distress.   Cardiovascular:   Pulses:       Popliteal pulses are 2+ on the right side, and 2+ on the left side.        Dorsalis pedis pulses are 2+ on the right side, and 2+ on the left side.        Posterior tibial pulses are 2+ on the right side, and 2+ on the left side.   Capillary refill 3 seconds all toes/distal feet, all toes/both feet warm to touch.      Negative lymphadenopathy bilateral popliteal fossa and tarsal tunnel.      Negavie lower extremity edema bilateral.     Musculoskeletal:        Right ankle: She exhibits normal range of motion, no swelling, no ecchymosis, no deformity, no laceration and normal pulse. Achilles tendon normal. Achilles tendon exhibits no pain, no defect and normal Lenz's test results.    Normal angle, base, station of gait. All ten toes without clubbing, cyanosis, or signs of ischemia.  No pain to palpation bilateral lower extremities.  Range of motion, stability, muscle strength, and muscle tone normal bilateral feet and legs.     Lymphadenopathy: No inguinal adenopathy noted on the right or left side.   Negative lymphadenopathy bilateral popliteal fossa and tarsal tunnel.    Negative lymphangitic streaking bilateral feet/ankles/legs.   Neurological: She is alert and oriented to person, place, and time. She has normal strength. She displays no atrophy and no tremor. No sensory deficit. She exhibits normal muscle tone. Gait normal.   Reflex Scores:       Patellar reflexes are 2+ on the right side and 2+ on the left side.       Achilles reflexes are 2+ on the right side and 2+ on the left side.  Negative tinel sign to percussion sural, superficial peroneal, deep peroneal, saphenous, and posterior tibial nerves right and left ankles and feet.     Skin: Skin is warm, dry and intact. Capillary refill takes 2 to 3 seconds. No abrasion, no bruising, no burn, no ecchymosis, no laceration, no lesion and no rash noted. She is not diaphoretic. No cyanosis or erythema. No pallor. Nails show no clubbing.   Dry scale with superficial flakes over an erythematous base between toes both feet without ulceration, drainage, pus, tracking, fluctuance, malodor, or cardinal signs infection.    Otherwise, Skin is normal age and health appropriate color, turgor, texture, and temperature bilateral lower extremities without ulceration, hyperpigmentation, discoloration, masses nodules or cords palpated.  No ecchymosis, erythema, edema, or cardinal signs of infection bilateral lower extremities.       Psychiatric: She has a normal mood and affect.             Assessment:       Encounter Diagnosis   Name Primary?    Tinea pedis of both feet Yes         Plan:       Gloria was seen today for foot problem and foot  pain.    Diagnoses and all orders for this visit:    Tinea pedis of both feet    Other orders  -     ciclopirox 0.77 % Gel; Apply topically 2 (two) times daily.      I counseled the patient on her conditions, their implications and medical management.        Rx loprox gel bid.    Discussed conservative treatment with shoes of adequate dimensions, material, and style to alleviate symptoms and delay or prevent surgical intervention.    Natural fiber socks changed twice daily.          Follow-up if symptoms worsen or fail to improve.

## 2018-12-10 NOTE — PROGRESS NOTES
SUBJECTIVE:   43 y.o. female   for follow up from ER for pelvic pain. No LMP recorded (lmp unknown). Patient is not currently having periods (Reason: Birth Control)..  Went to ED 18 for pelvic pain.  Pain started about 1 year ago, but has worsened.  Pain became constant and was 8-9 out of 10.  She had amenorrhea due to Nexplanon, but Nexplanon removed 11-15-18 due to the 3 year expiration.  She then started having uterine bleeding.  Patient also has bowel issues, and had normal colonoscopy 18.  She also reports urinary incontinence.  She is awaiting Nexplanon insertion, and is scheduled soon.  Pelvic u/s showed three 3 cm uterine fibroids.  She is here today for follow up.         Past Medical History:   Diagnosis Date    Ankle pain     Low back pain      Past Surgical History:   Procedure Laterality Date    CHOLECYSTECTOMY      INSERTION OF CONTRACEPTIVE CAPSULE  2012    NEXPLANON REMOVED     Social History     Socioeconomic History    Marital status: Single     Spouse name: Not on file    Number of children: Not on file    Years of education: Not on file    Highest education level: Not on file   Social Needs    Financial resource strain: Not on file    Food insecurity - worry: Not on file    Food insecurity - inability: Not on file    Transportation needs - medical: Not on file    Transportation needs - non-medical: Not on file   Occupational History    Not on file   Tobacco Use    Smoking status: Never Smoker    Smokeless tobacco: Never Used   Substance and Sexual Activity    Alcohol use: Yes     Comment: social    Drug use: No    Sexual activity: Not Currently     Birth control/protection: Implant     Comment: Implant placement-2015   Other Topics Concern    Not on file   Social History Narrative    Not on file     Family History   Problem Relation Age of Onset    Breast cancer Neg Hx     Ovarian cancer Neg Hx     Colon cancer Neg Hx      OB History     Para Term  AB Living   2 2       2   SAB TAB Ectopic Multiple Live Births           2      # Outcome Date GA Lbr Dileep/2nd Weight Sex Delivery Anes PTL Lv   2 Para            1 Para                     Current Outpatient Medications   Medication Sig Dispense Refill    amitriptyline (ELAVIL) 25 MG tablet       cholestyramine (QUESTRAN) 4 gram packet MIX THE CONTENTS OF ONE PACKET UTD AND TK BID.  2    cholestyramine-aspartame (CHOLESTYRAMINE LIGHT) 4 gram PwPk Take by mouth.      gabapentin (NEURONTIN) 600 MG tablet Take 600 mg by mouth 3 (three) times daily.      ibuprofen (ADVIL,MOTRIN) 600 MG tablet Take 1 tablet (600 mg total) by mouth every 6 (six) hours as needed for Pain. 20 tablet 0    LIDOTRAL 3.88 % Crea APPLY TO THE AFFECTED AREA 2 TIMES TO 3 TIMES DAILY  3    naproxen sodium (ANAPROX) 220 MG tablet Take 220 mg by mouth every 12 (twelve) hours.      VIVA PATCH 2.5-4-2 % PtMd APPLY UP TO 3 PATCHES TO THE AFFECTED AREA TWICE DAILY  3    ciclopirox 0.77 % Gel Apply topically 2 (two) times daily. 100 g 3     No current facility-administered medications for this visit.      Allergies: Patient has no known allergies.     ROS:  Constitutional: no weight loss, weight gain, fever, fatigue  Eyes:  No vision changes, glasses/contacts  ENT/Mouth: No ulcers, sinus problems, ears ringing, headache  Cardiovascular: No inability to lie flat, chest pain, exercise intolerance, swelling, heart palpitations  Respiratory: No wheezing, coughing blood, shortness of breath, or cough  Gastrointestinal: No diarrhea, bloody stool, nausea/vomiting,+ constipation, gas, hemorrhoids  Genitourinary: No blood in urine, painful urination, urgency of urination, frequency of urination, incomplete emptying, +incontinence, +abnormal bleeding, painful periods, heavy periods, vaginal discharge, vaginal odor, painful intercourse, sexual problems, bleeding after intercourse.  Musculoskeletal: No muscle weakness  Skin/Breast: No  "painful breasts, nipple discharge, masses, rash, ulcers  Neurological: No passing out, seizures, numbness, headache  Endocrine: No diabetes, hypothyroid, hyperthyroid, hot flashes, hair loss, abnormal hair growth, ance  Psychiatric: No depression, crying  Hematologic: No bruises, bleeding, swollen lymph nodes, anemia.      OBJECTIVE:   The patient appears well, alert, oriented x 3, in no distress.  /68 (BP Location: Left arm, Patient Position: Sitting, BP Method: Large (Manual))   Ht 5' 8" (1.727 m)   Wt 92.8 kg (204 lb 9.4 oz)   LMP  (LMP Unknown) Comment: LMP 2007 Nexplanon inserted on 11/09/15  BMI 31.11 kg/m²   ABDOMEN: no hernias, masses, or hepatosplenomegaly  GENITALIA: normal external genitalia, no erythema, no discharge  URETHRA: normal urethra, normal urethral meatus  VAGINA: Normal  CERVIX: no lesions or cervical motion tenderness  UTERUS: normal size, contour, position, consistency, mobility, non-tender  ADNEXA: no mass, fullness, tenderness      ASSESSMENT:   1. Uterine leiomyoma, unspecified location  Urinalysis    Urine culture   2. Pelvic pain  Urinalysis    Urine culture       PLAN:   Orders Placed This Encounter    Urine culture    Urinalysis    Urinalysis Microscopic     Discussed bleeding issues expected after Nexplanon expiration and removal.  Awaiting Nexplanon reinsertion  Discussed fibroids, mgt options.  Nexplanon should control bleeding, but it may or not improve pain.  NSAIDS for short term relief.  Consider hysterectomy if pain related to fibroids.  Consider evaluation of bowel and bladder issues as cause for pelvic pain.  Return to clinic prn  "

## 2018-12-18 ENCOUNTER — APPOINTMENT (OUTPATIENT)
Dept: LAB | Facility: OTHER | Age: 43
End: 2018-12-18
Payer: COMMERCIAL

## 2018-12-18 DIAGNOSIS — R93.2 NONVISUALIZATION OF GALLBLADDER: ICD-10-CM

## 2018-12-18 DIAGNOSIS — R10.11 ABDOMINAL PAIN, RIGHT UPPER QUADRANT: Primary | ICD-10-CM

## 2018-12-18 DIAGNOSIS — R30.0 DYSURIA: ICD-10-CM

## 2018-12-18 LAB
BACTERIA #/AREA URNS HPF: ABNORMAL /HPF
BILIRUB UR QL STRIP: NEGATIVE
CLARITY UR: CLEAR
COLOR UR: YELLOW
GLUCOSE UR QL STRIP: NEGATIVE
HGB UR QL STRIP: NEGATIVE
KETONES UR QL STRIP: NEGATIVE
LEUKOCYTE ESTERASE UR QL STRIP: ABNORMAL
MICROSCOPIC COMMENT: ABNORMAL
NITRITE UR QL STRIP: NEGATIVE
PH UR STRIP: 6 [PH] (ref 5–8)
PROT UR QL STRIP: NEGATIVE
SP GR UR STRIP: 1.02 (ref 1–1.03)
SQUAMOUS #/AREA URNS HPF: 4 /HPF
URN SPEC COLLECT METH UR: ABNORMAL
UROBILINOGEN UR STRIP-ACNC: NEGATIVE EU/DL
WBC #/AREA URNS HPF: 5 /HPF (ref 0–5)

## 2018-12-18 PROCEDURE — 81000 URINALYSIS NONAUTO W/SCOPE: CPT

## 2018-12-18 PROCEDURE — 87086 URINE CULTURE/COLONY COUNT: CPT

## 2018-12-19 LAB — BACTERIA UR CULT: NO GROWTH

## 2019-01-29 ENCOUNTER — OFFICE VISIT (OUTPATIENT)
Dept: OBSTETRICS AND GYNECOLOGY | Facility: CLINIC | Age: 44
End: 2019-01-29
Payer: COMMERCIAL

## 2019-01-29 ENCOUNTER — HOSPITAL ENCOUNTER (OUTPATIENT)
Dept: RADIOLOGY | Facility: OTHER | Age: 44
Discharge: HOME | End: 2019-01-29
Attending: OBSTETRICS & GYNECOLOGY
Payer: COMMERCIAL

## 2019-01-29 VITALS
HEIGHT: 68 IN | SYSTOLIC BLOOD PRESSURE: 124 MMHG | WEIGHT: 209 LBS | DIASTOLIC BLOOD PRESSURE: 80 MMHG | BODY MASS INDEX: 31.67 KG/M2

## 2019-01-29 DIAGNOSIS — N93.9 ABNORMAL UTERINE BLEEDING (AUB): ICD-10-CM

## 2019-01-29 DIAGNOSIS — N93.9 ABNORMAL UTERINE BLEEDING (AUB): Primary | ICD-10-CM

## 2019-01-29 PROCEDURE — 76830 TRANSVAGINAL US NON-OB: CPT | Mod: TC

## 2019-01-29 PROCEDURE — 99999 PR PBB SHADOW E&M-EST. PATIENT-LVL III: CPT | Mod: PBBFAC,,, | Performed by: OBSTETRICS & GYNECOLOGY

## 2019-01-29 PROCEDURE — 99214 PR OFFICE/OUTPT VISIT, EST, LEVL IV, 30-39 MIN: ICD-10-PCS | Mod: S$GLB,,, | Performed by: OBSTETRICS & GYNECOLOGY

## 2019-01-29 PROCEDURE — 99999 PR PBB SHADOW E&M-EST. PATIENT-LVL III: ICD-10-PCS | Mod: PBBFAC,,, | Performed by: OBSTETRICS & GYNECOLOGY

## 2019-01-29 PROCEDURE — 99214 OFFICE O/P EST MOD 30 MIN: CPT | Mod: S$GLB,,, | Performed by: OBSTETRICS & GYNECOLOGY

## 2019-01-29 PROCEDURE — 76856 US EXAM PELVIC COMPLETE: CPT | Mod: 26,,, | Performed by: RADIOLOGY

## 2019-01-29 PROCEDURE — 76830 TRANSVAGINAL US NON-OB: CPT | Mod: 26,,, | Performed by: RADIOLOGY

## 2019-01-29 PROCEDURE — 3008F BODY MASS INDEX DOCD: CPT | Mod: CPTII,S$GLB,, | Performed by: OBSTETRICS & GYNECOLOGY

## 2019-01-29 PROCEDURE — 76830 US PELVIS COMP WITH TRANSVAG NON-OB (XPD): ICD-10-PCS | Mod: 26,,, | Performed by: RADIOLOGY

## 2019-01-29 PROCEDURE — 76856 US PELVIS COMP WITH TRANSVAG NON-OB (XPD): ICD-10-PCS | Mod: 26,,, | Performed by: RADIOLOGY

## 2019-01-29 PROCEDURE — 3008F PR BODY MASS INDEX (BMI) DOCUMENTED: ICD-10-PCS | Mod: CPTII,S$GLB,, | Performed by: OBSTETRICS & GYNECOLOGY

## 2019-01-29 RX ORDER — FLUCONAZOLE 200 MG/1
TABLET ORAL
Refills: 1 | COMMUNITY
Start: 2018-12-05 | End: 2021-02-19

## 2019-01-29 RX ORDER — ESTRADIOL 2 MG/1
2 TABLET ORAL DAILY
Qty: 30 TABLET | Refills: 0 | Status: SHIPPED | OUTPATIENT
Start: 2019-01-29 | End: 2019-02-14 | Stop reason: SDUPTHER

## 2019-01-29 RX ORDER — AMOXICILLIN AND CLAVULANATE POTASSIUM 875; 125 MG/1; MG/1
TABLET, FILM COATED ORAL
COMMUNITY
Start: 2019-01-08 | End: 2019-03-08

## 2019-01-29 RX ORDER — TRIAMCINOLONE ACETONIDE 1 MG/G
OINTMENT TOPICAL
COMMUNITY
Start: 2018-12-07 | End: 2021-02-19

## 2019-01-29 NOTE — PROGRESS NOTES
Subjective:       Patient ID: Gloria Chew is a 43 y.o. female.    Chief Complaint:  Vaginal Bleeding (Surgery consult)    History of Present Illness:  HPI  42 y/o  presents for evaluation of prolonged vaginal bleeding for almost a month (since 1/3/19). Bleeding has ranged from light bleeding to heavy bleeding (4-6 pads per day). She has been seen in the ED x 2 in recent months due to pelvic pain, was told she had fibroids. Left ovarian simple cyst measuring 4 cm. Pain is still continuing. She has Nexplanon in place and had been having amenorrhea prior to this episode of bleeding.    Pelvic U/S 2018:  The uterus is normal in size and measures 7 x 5.6 x 6 cm. The endometrial stripe is normal and measures 6mm. There are 3 uterine fibroids.  Along the posterior body of the uterus is a subserosal 2 x 2.7 x 2.4 cm fibroid, along the posterior body of the uterus is a 2.7 x 2.4 x 2.6 cm subserosal fibroid and there is a 3.4 by 2.4 x 2.1 cm intramural fibroid along the anterior body of the uterus.    The right ovary measures 2.9 x 1.8 x 1.1 cm.   The left ovary measures 5.4 x 2.6 x 3.5cm.  The left ovary contains a 4 cm simple cyst.  There is appropriate flow in the ovaries.    GYN & OB History  Patient's last menstrual period was 2019 (exact date).   Date of Last Pap: 10/8/2018    OB History    Para Term  AB Living   2 2       2   SAB TAB Ectopic Multiple Live Births           2      # Outcome Date GA Lbr Dileep/2nd Weight Sex Delivery Anes PTL Lv   2 Para            1 Para                   Review of Systems  Review of Systems   Constitutional: Negative for chills, diaphoresis, fatigue and fever.   Respiratory: Negative for cough and shortness of breath.    Cardiovascular: Negative for chest pain and palpitations.   Gastrointestinal: Negative for abdominal pain, constipation, diarrhea, nausea and vomiting.   Genitourinary: Positive for dysmenorrhea, menorrhagia, menstrual problem, pelvic pain  and vaginal bleeding. Negative for dyspareunia, dysuria, vaginal discharge and vaginal pain.   Musculoskeletal: Negative for back pain and myalgias.   Neurological: Negative for headaches.   Psychiatric/Behavioral: Negative for depression. The patient is not nervous/anxious.            Objective:    Physical Exam:   Constitutional: She is oriented to person, place, and time. She appears well-developed and well-nourished. No distress.    HENT:   Head: Normocephalic and atraumatic.    Eyes: EOM are normal.    Neck: Normal range of motion.     Pulmonary/Chest: Effort normal. She exhibits no mass and no tenderness. Right breast exhibits no inverted nipple, no mass, no nipple discharge, no skin change, no tenderness and no swelling. Left breast exhibits no inverted nipple, no mass, no nipple discharge, no skin change, no tenderness and no swelling. Breasts are symmetrical.        Abdominal: Soft. She exhibits no distension and no mass. There is no tenderness. There is no rebound and no guarding. No hernia.     Genitourinary:   Genitourinary Comments: Normal external female genitalia; vagina rugated, normal; cervix normal, no masses; uterus 6-8 weeks size, small mobile nontender; no adnexal masses palpated.           Musculoskeletal: Normal range of motion and moves all extremeties.       Neurological: She is alert and oriented to person, place, and time.    Skin: Skin is warm. No rash noted.    Psychiatric: She has a normal mood and affect. Her behavior is normal. Judgment and thought content normal.          Assessment:        1. Abnormal uterine bleeding (AUB)            Plan:      Gloria was seen today for vaginal bleeding.    Diagnoses and all orders for this visit:    Abnormal uterine bleeding (AUB)  - Reviewed U/S with patient, noting 3 small fibroids, two of which are subserosal. One 3 cm intramural, which may be contributing to AUB.   - Discussed that Nexplanon may be contributing more to AUB, given her history of  amenorrhea with Nexplanon in the past.  - Will repeat U/S to evaluate adnexal cyst for possible etiology of pelvic pain.   - Recommended EMBx.   - Estrace daily x 7 days for bleeding.  - Scheduled NSAIDs.   - Briefly discussed surgical options.     -     US Pelvis Complete Non OB; Future  -     estradiol (ESTRACE) 2 MG tablet; Take 1 tablet (2 mg total) by mouth once daily.    No orders of the defined types were placed in this encounter.      Follow-up in about 1 week (around 2/5/2019) for EMBx.

## 2019-02-06 ENCOUNTER — PROCEDURE VISIT (OUTPATIENT)
Dept: OBSTETRICS AND GYNECOLOGY | Facility: CLINIC | Age: 44
End: 2019-02-06
Payer: COMMERCIAL

## 2019-02-06 VITALS
DIASTOLIC BLOOD PRESSURE: 80 MMHG | BODY MASS INDEX: 31.37 KG/M2 | WEIGHT: 207 LBS | SYSTOLIC BLOOD PRESSURE: 126 MMHG | HEIGHT: 68 IN

## 2019-02-06 DIAGNOSIS — N93.9 ABNORMAL UTERINE BLEEDING (AUB): Primary | ICD-10-CM

## 2019-02-06 LAB
B-HCG UR QL: NEGATIVE
CTP QC/QA: YES

## 2019-02-06 PROCEDURE — 88305 TISSUE EXAM BY PATHOLOGIST: CPT | Performed by: PATHOLOGY

## 2019-02-06 PROCEDURE — 99212 OFFICE O/P EST SF 10 MIN: CPT | Mod: 25,S$GLB,, | Performed by: OBSTETRICS & GYNECOLOGY

## 2019-02-06 PROCEDURE — 99212 PR OFFICE/OUTPT VISIT, EST, LEVL II, 10-19 MIN: ICD-10-PCS | Mod: 25,S$GLB,, | Performed by: OBSTETRICS & GYNECOLOGY

## 2019-02-06 PROCEDURE — 58100 BIOPSY OF UTERUS LINING: CPT | Mod: S$GLB,,, | Performed by: OBSTETRICS & GYNECOLOGY

## 2019-02-06 PROCEDURE — 88305 TISSUE EXAM BY PATHOLOGIST: CPT | Mod: 26,,, | Performed by: PATHOLOGY

## 2019-02-06 PROCEDURE — 81025 POCT URINE PREGNANCY: ICD-10-PCS | Mod: S$GLB,,, | Performed by: OBSTETRICS & GYNECOLOGY

## 2019-02-06 PROCEDURE — 88305 TISSUE SPECIMEN TO PATHOLOGY, OBSTETRICS/GYNECOLOGY: ICD-10-PCS | Mod: 26,,, | Performed by: PATHOLOGY

## 2019-02-06 PROCEDURE — 58100 BIOPSY (GYNECOLOGICAL): ICD-10-PCS | Mod: S$GLB,,, | Performed by: OBSTETRICS & GYNECOLOGY

## 2019-02-06 PROCEDURE — 81025 URINE PREGNANCY TEST: CPT | Mod: S$GLB,,, | Performed by: OBSTETRICS & GYNECOLOGY

## 2019-02-06 NOTE — PROCEDURES
Biopsy (Gynecological)  Date/Time: 2/6/2019 9:48 AM  Performed by: Geneva Orr MD  Authorized by: Geneva Orr MD     Consent Done?:  Yes (Written)   Patient was prepped and draped in the normal sterile fashion.  Local anesthesia used?: No      Biopsy Location:  Uterus (Uterus sounded to 8 cm. 3 passes with moderate tissue obtained.)  Estimated blood loss (cc):  2   Patient tolerated the procedure well with no immediate complications.

## 2019-02-14 ENCOUNTER — TELEPHONE (OUTPATIENT)
Dept: OBSTETRICS AND GYNECOLOGY | Facility: CLINIC | Age: 44
End: 2019-02-14

## 2019-02-14 DIAGNOSIS — N93.9 ABNORMAL UTERINE BLEEDING (AUB): ICD-10-CM

## 2019-02-14 RX ORDER — ESTRADIOL 2 MG/1
2 TABLET ORAL DAILY
Qty: 30 TABLET | Refills: 0 | Status: SHIPPED | OUTPATIENT
Start: 2019-02-14 | End: 2019-03-08

## 2019-02-14 NOTE — TELEPHONE ENCOUNTER
Called patient with normal EMBx result. She is still having some bleeding -recommended to continue estrace. Refill placed. Bleeding precautions. F/U as scheduled for preop appt and D&C/ablation scheduled for 3/13.

## 2019-02-22 NOTE — PROGRESS NOTES
Subjective:       Patient ID: Gloria Chew is a 43 y.o. female.    Chief Complaint:  Procedure      History of Present Illness  HPI  42 y/o  presents for follow up of prolonged AUB and for EMBx. Bleeding has ranged from light bleeding to heavy bleeding (4-6 pads per day). She has been seen in the ED x 2 in recent months due to pelvic pain, was told she had fibroids. Left ovarian simple cyst measuring 4 cm. Pain is still continuing. She has Nexplanon in place and had been having amenorrhea prior to this episode of bleeding.     Pelvic U/S 19:  FINDINGS:  Uterus:    Size: 6.6 x 5.3 x 6.7 cm    Masses: Multiple heterogeneous masses are noted throughout the intramural uterus.  For example in the uterine fundus there is a 2.8 x 2.6 x 3.2 cm heterogeneous mass.    Endometrium: Normal in this pre menopausal patient, measuring 3 mm.    Right ovary:    Not visualized    Left ovary:    Size: 2.8 x 1.4 x 2.5 cm    Appearance: Normal    Vascular Flow: Normal.    Free Fluid:    None.          GYN & OB History  Patient's last menstrual period was 2019 (exact date).   Date of Last Pap: 10/8/2018    OB History    Para Term  AB Living   2 2       2   SAB TAB Ectopic Multiple Live Births           2      # Outcome Date GA Lbr Dileep/2nd Weight Sex Delivery Anes PTL Lv   2 Para            1 Para                   Review of Systems  Review of Systems   Constitutional: Negative for chills, diaphoresis, fatigue and fever.   Respiratory: Negative for cough and shortness of breath.    Cardiovascular: Negative for chest pain and palpitations.   Gastrointestinal: Negative for abdominal pain, constipation, diarrhea, nausea and vomiting.   Genitourinary: Positive for dysmenorrhea, menorrhagia, menstrual problem, pelvic pain and vaginal bleeding. Negative for dyspareunia, dysuria, vaginal discharge and vaginal pain.   Musculoskeletal: Negative for back pain and myalgias.   Integumentary:  Negative for rash and acne.    Neurological: Negative for headaches.   Psychiatric/Behavioral: Negative for depression. The patient is not nervous/anxious.            Objective:    Physical Exam:   Constitutional: She is oriented to person, place, and time. She appears well-developed and well-nourished. No distress.    HENT:   Head: Normocephalic and atraumatic.    Eyes: EOM are normal.    Neck: Normal range of motion.     Pulmonary/Chest: Effort normal. She exhibits no mass and no tenderness. Right breast exhibits no inverted nipple, no mass, no nipple discharge, no skin change, no tenderness and no swelling. Left breast exhibits no inverted nipple, no mass, no nipple discharge, no skin change, no tenderness and no swelling. Breasts are symmetrical.        Abdominal: Soft. She exhibits no distension and no mass. There is no tenderness. There is no rebound and no guarding. No hernia.     Genitourinary:   Genitourinary Comments: Normal external female genitalia; vagina rugated, normal; cervix normal, no masses; uterus 6-8 weeks size, small mobile nontender; no adnexal masses palpated.           Musculoskeletal: Normal range of motion and moves all extremeties.       Neurological: She is alert and oriented to person, place, and time.    Skin: Skin is warm. No rash noted.    Psychiatric: She has a normal mood and affect. Her behavior is normal. Judgment and thought content normal.          Assessment:        1. Abnormal uterine bleeding (AUB)             Plan:      Gloria was seen today for procedure.    Diagnoses and all orders for this visit:    Abnormal uterine bleeding (AUB)  - EMBx done, see separate procedure note.  - Patient declines further medical management and desires surgical management. Discussed D&C/Hysteroscopy and endometrial ablation vs hysterectomy. Patient desires trial of the former. Counseled that may not eliminate bleeding. Patient expressed understanding.   - Case scheduled for 3/13/19.   -     POCT Urine Pregnancy  -      Case Request Operating Room: HYSTEROSCOPY, WITH DILATION AND CURETTAGE OF UTERUS, ABLATION, ENDOMETRIUM  -     Biopsy (Gynecological)  -     Tissue Specimen To Pathology, Obstetrics/Gynecology    Orders Placed This Encounter   Procedures    Biopsy (Gynecological)    POCT Urine Pregnancy       Follow-up in about 4 weeks (around 3/6/2019).

## 2019-02-28 ENCOUNTER — HOSPITAL ENCOUNTER (OUTPATIENT)
Dept: RADIOLOGY | Facility: OTHER | Age: 44
Discharge: HOME | End: 2019-02-28
Attending: INTERNAL MEDICINE
Payer: COMMERCIAL

## 2019-02-28 DIAGNOSIS — R10.11 ABDOMINAL PAIN, RIGHT UPPER QUADRANT: ICD-10-CM

## 2019-02-28 DIAGNOSIS — R93.2 ABNORMAL FINDINGS ON DIAGNOSTIC IMAGING OF LIVER AND BILIARY TRACT: ICD-10-CM

## 2019-02-28 DIAGNOSIS — R30.0 DYSURIA: ICD-10-CM

## 2019-02-28 PROCEDURE — 74183 MRI ABD W/O CNTR FLWD CNTR: CPT | Mod: TC

## 2019-02-28 PROCEDURE — 25500020 PHARM REV CODE 255: Performed by: INTERNAL MEDICINE

## 2019-02-28 PROCEDURE — 74183 MRI ABD W/O CNTR FLWD CNTR: CPT | Mod: 26,,, | Performed by: RADIOLOGY

## 2019-02-28 PROCEDURE — A9585 GADOBUTROL INJECTION: HCPCS | Performed by: INTERNAL MEDICINE

## 2019-02-28 PROCEDURE — 74183 MRI ABDOMEN W WO CONTRAST: ICD-10-PCS | Mod: 26,,, | Performed by: RADIOLOGY

## 2019-02-28 RX ORDER — GADOBUTROL 604.72 MG/ML
10 INJECTION INTRAVENOUS
Status: COMPLETED | OUTPATIENT
Start: 2019-02-28 | End: 2019-02-28

## 2019-02-28 RX ADMIN — GADOBUTROL 10 ML: 604.72 INJECTION INTRAVENOUS at 10:02

## 2019-03-08 ENCOUNTER — HOSPITAL ENCOUNTER (OUTPATIENT)
Dept: PREADMISSION TESTING | Facility: OTHER | Age: 44
Discharge: HOME | End: 2019-03-08
Attending: OBSTETRICS & GYNECOLOGY
Payer: COMMERCIAL

## 2019-03-08 ENCOUNTER — ANESTHESIA EVENT (OUTPATIENT)
Dept: SURGERY | Facility: OTHER | Age: 44
End: 2019-03-08
Payer: COMMERCIAL

## 2019-03-08 ENCOUNTER — OFFICE VISIT (OUTPATIENT)
Dept: OBSTETRICS AND GYNECOLOGY | Facility: CLINIC | Age: 44
End: 2019-03-08
Payer: COMMERCIAL

## 2019-03-08 VITALS
BODY MASS INDEX: 31.83 KG/M2 | HEART RATE: 68 BPM | SYSTOLIC BLOOD PRESSURE: 131 MMHG | TEMPERATURE: 98 F | WEIGHT: 210 LBS | HEIGHT: 68 IN | DIASTOLIC BLOOD PRESSURE: 71 MMHG | OXYGEN SATURATION: 99 %

## 2019-03-08 VITALS
DIASTOLIC BLOOD PRESSURE: 86 MMHG | HEIGHT: 68 IN | WEIGHT: 207.44 LBS | SYSTOLIC BLOOD PRESSURE: 132 MMHG | BODY MASS INDEX: 31.44 KG/M2

## 2019-03-08 DIAGNOSIS — N93.9 ABNORMAL UTERINE BLEEDING (AUB): Primary | ICD-10-CM

## 2019-03-08 LAB
BASOPHILS # BLD AUTO: 0.02 K/UL
BASOPHILS NFR BLD: 0.3 %
DIFFERENTIAL METHOD: NORMAL
EOSINOPHIL # BLD AUTO: 0.3 K/UL
EOSINOPHIL NFR BLD: 4.1 %
ERYTHROCYTE [DISTWIDTH] IN BLOOD BY AUTOMATED COUNT: 13.2 %
HCT VFR BLD AUTO: 38.6 %
HGB BLD-MCNC: 12.4 G/DL
LYMPHOCYTES # BLD AUTO: 2 K/UL
LYMPHOCYTES NFR BLD: 28.2 %
MCH RBC QN AUTO: 29.7 PG
MCHC RBC AUTO-ENTMCNC: 32.1 G/DL
MCV RBC AUTO: 93 FL
MONOCYTES # BLD AUTO: 0.4 K/UL
MONOCYTES NFR BLD: 5.9 %
NEUTROPHILS # BLD AUTO: 4.3 K/UL
NEUTROPHILS NFR BLD: 61.2 %
PLATELET # BLD AUTO: 303 K/UL
PMV BLD AUTO: 9.6 FL
RBC # BLD AUTO: 4.17 M/UL
WBC # BLD AUTO: 6.99 K/UL

## 2019-03-08 PROCEDURE — 36415 COLL VENOUS BLD VENIPUNCTURE: CPT

## 2019-03-08 PROCEDURE — 99499 NO LOS: ICD-10-PCS | Mod: S$GLB,,, | Performed by: OBSTETRICS & GYNECOLOGY

## 2019-03-08 PROCEDURE — 85025 COMPLETE CBC W/AUTO DIFF WBC: CPT

## 2019-03-08 PROCEDURE — 99499 UNLISTED E&M SERVICE: CPT | Mod: S$GLB,,, | Performed by: OBSTETRICS & GYNECOLOGY

## 2019-03-08 PROCEDURE — 99999 PR PBB SHADOW E&M-EST. PATIENT-LVL II: ICD-10-PCS | Mod: PBBFAC,,, | Performed by: OBSTETRICS & GYNECOLOGY

## 2019-03-08 PROCEDURE — 99999 PR PBB SHADOW E&M-EST. PATIENT-LVL II: CPT | Mod: PBBFAC,,, | Performed by: OBSTETRICS & GYNECOLOGY

## 2019-03-08 RX ORDER — LIDOCAINE HYDROCHLORIDE 10 MG/ML
0.5 INJECTION, SOLUTION EPIDURAL; INFILTRATION; INTRACAUDAL; PERINEURAL ONCE
Status: CANCELLED | OUTPATIENT
Start: 2019-03-08 | End: 2019-03-08

## 2019-03-08 RX ORDER — SODIUM CHLORIDE, SODIUM LACTATE, POTASSIUM CHLORIDE, CALCIUM CHLORIDE 600; 310; 30; 20 MG/100ML; MG/100ML; MG/100ML; MG/100ML
INJECTION, SOLUTION INTRAVENOUS CONTINUOUS
Status: CANCELLED | OUTPATIENT
Start: 2019-03-08

## 2019-03-08 RX ORDER — MIDAZOLAM HYDROCHLORIDE 5 MG/ML
5 INJECTION INTRAMUSCULAR; INTRAVENOUS
Status: CANCELLED | OUTPATIENT
Start: 2019-03-08 | End: 2019-03-08

## 2019-03-08 RX ORDER — OXYCODONE HYDROCHLORIDE 5 MG/1
5 TABLET ORAL ONCE AS NEEDED
Status: CANCELLED | OUTPATIENT
Start: 2019-03-08 | End: 2030-08-04

## 2019-03-08 RX ORDER — FAMOTIDINE 20 MG/1
20 TABLET, FILM COATED ORAL
Status: CANCELLED | OUTPATIENT
Start: 2019-03-08 | End: 2019-03-08

## 2019-03-08 NOTE — ANESTHESIA PREPROCEDURE EVALUATION
03/08/2019  Gloria Chew is a 43 y.o., female.    Anesthesia Evaluation    I have reviewed the Patient Summary Reports.    I have reviewed the Nursing Notes.   I have reviewed the Medications.     Review of Systems  Anesthesia Hx:  No problems with previous Anesthesia    Social:  Social Alcohol Use, Non-Smoker    Hematology/Oncology:  Hematology Normal   Oncology Normal     EENT/Dental:EENT/Dental Normal   Cardiovascular:  Cardiovascular Normal Exercise tolerance: good     Pulmonary:  Pulmonary Normal    Renal/:  Renal/ Normal     Hepatic/GI:  Hepatic/GI Normal    Musculoskeletal:  Spine Disorders: Chronic Pain    Neurological:  Neurology Normal    Endocrine:  Endocrine Normal    Dermatological:  Skin Normal    Psych:  Psychiatric Normal           Physical Exam  General:  Obesity    Airway/Jaw/Neck:  Airway Findings: Mouth Opening: Normal Tongue: Normal  General Airway Assessment: Adult, Good  Mallampati: I  TM Distance: Normal, at least 6 cm  Jaw/Neck Findings:  Neck ROM: Normal ROM      Dental:  Dental Findings: upper braces, lower braces        Mental Status:  Mental Status Findings:  Cooperative, Alert and Oriented         Anesthesia Plan  Type of Anesthesia, risks & benefits discussed:  Anesthesia Type:  general  Patient's Preference:   Intra-op Monitoring Plan: standard ASA monitors  Intra-op Monitoring Plan Comments:   Post Op Pain Control Plan: per primary service following discharge from PACU  Post Op Pain Control Plan Comments:   Induction:   IV  Beta Blocker:         Informed Consent: Patient understands risks and agrees with Anesthesia plan.  Questions answered. Anesthesia consent signed with patient.  ASA Score: 1     Day of Surgery Review of History & Physical:    H&P update referred to the surgeon.     Anesthesia Plan Notes: CBC today.        Ready For Surgery From Anesthesia  Perspective.

## 2019-03-08 NOTE — DISCHARGE INSTRUCTIONS
PRE-ADMIT TESTING -  [phone]    [address]  [place] [facility]          Your surgery has been scheduled at [facility]. We are pleased to have the opportunity to serve you. For Further Information please call [phone].    On the day of surgery please report to the Information Desk on the 1st floor.    · CONTACT YOUR PHYSICIAN'S OFFICE THE DAY PRIOR TO YOUR SURGERY TO OBTAIN YOUR ARRIVAL TIME.     · The evening before surgery do not eat anything after 9 p.m. ( this includes hard candy, chewing gum and mints).  You may only have GATORADE, POWERADE AND WATER  from 9 p.m. until you leave your home.   DO NOT DRINK ANY LIQUIDS ON THE WAY TO THE HOSPITAL.      SPECIAL MEDICATION INSTRUCTIONS: TAKE medications checked off by the Anesthesiologist on your Medication List.    Angiogram Patients: Take medications as instructed by your physician, including aspirin.     Surgery Patients:    If you take ASPIRIN - Your PHYSICIAN/SURGEON will need to inform you IF/OR when you need to stop taking aspirin prior to your surgery.     Do Not take any medications containing IBUPROFEN.  Do Not Wear any make-up or dark nail polish   (especially eye make-up) to surgery. If you come to surgery with makeup on you will be required to remove the makeup or nail polish.    Do not shave your surgical area at least 5 days prior to your surgery. The surgical prep will be performed at the hospital according to Infection Control regulations.    Leave all valuables at home.   Do Not wear any jewelry or watches, including any metal in body piercings. Jewelry must be removed prior to coming to the hospital.  There is a possibility that rings that are unable to be removed may be cut off if they are on the surgical extremity.    Contact Lens must be removed before surgery. Either do not wear the contact lens or bring a case and solution for storage.  Please bring a container for eyeglasses or dentures as required.  Bring  any paperwork your physician has provided, such as consent forms,  history and physicals, doctor's orders, etc.   Bring comfortable clothes that are loose fitting to wear upon discharge. Take into consideration the type of surgery being performed.  Maintain your diet as advised per your physician the day prior to surgery.      Adequate rest the night before surgery is advised.   Park in the Parking lot behind the hospital or in the [facility] across the street from the parking lot. Parking is complimentary.  If you will be discharged the same day as your procedure, please arrange for a responsible adult to drive you home or to accompany you if traveling by taxi.   YOU WILL NOT BE PERMITTED TO DRIVE OR TO LEAVE THE HOSPITAL ALONE AFTER SURGERY.   It is strongly recommended that you arrange for someone to remain with you for the first 24 hrs following your surgery.       Thank you for your cooperation.  The Staff of [facility].        Bathing Instructions                                                                 Please shower the evening before and morning of your procedure with    ANTIBACTERIAL SOAP. ( DIAL, etc )  Concentrate on the surgical area   for at least 3 minutes and rinse completely. Dry off as usual.   Do not use any deodorant, powder, body lotions, perfume, after shave or    cologne.

## 2019-03-08 NOTE — H&P (VIEW-ONLY)
History & Physical  Gynecology      SUBJECTIVE:     Chief Complaint: Pre-op Exam       History of Present Illness:  42 y/o  presents for surgery workup for scheduled hysteroscopy/D&C/Novasure ablation for AUB. Patient has Nexplanon in place, had been having amenorrhea but two months ago had daily bleeding for one month. EMBx was benign. She does have history of pelvic pain, but U/S in past 4-5 months have noted a resolved 4 cm simple ovarian cyst. She does not desire hysterectomy at this time. She has tried treatments with estrace and NSAIDs which has not helped. At previous visits, discussed additional medical management as well, and patient declines.    Pelvic U/S:  Uterus:    Size: 6.6 x 5.3 x 6.7 cm    Masses: Multiple heterogeneous masses are noted throughout the intramural uterus.  For example in the uterine fundus there is a 2.8 x 2.6 x 3.2 cm heterogeneous mass.    Endometrium: Normal in this pre menopausal patient, measuring 3 mm.    Right ovary:    Not visualized    Left ovary:    Size: 2.8 x 1.4 x 2.5 cm    Appearance: Normal    Vascular Flow: Normal.    Free Fluid:    None.    ENDOMETRIUM, BIOPSY:  -Scant fragment of endometrium with stromal decidualization and gland atrophy suggestive of exogenous progestin  effect.  -No hyperplasia or malignancy  -Multiple fragments of benign unremarkable endocervical epithelium.    Review of patient's allergies indicates:  No Known Allergies    Past Medical History:   Diagnosis Date    Ankle pain     Low back pain      Past Surgical History:   Procedure Laterality Date    CHOLECYSTECTOMY      INSERTION OF CONTRACEPTIVE CAPSULE  2012    NEXPLANON REMOVED     OB History      Para Term  AB Living    2 2       2    SAB TAB Ectopic Multiple Live Births            2        Family History   Problem Relation Age of Onset    Breast cancer Neg Hx     Ovarian cancer Neg Hx     Colon cancer Neg Hx      Social History     Tobacco Use    Smoking  status: Never Smoker    Smokeless tobacco: Never Used   Substance Use Topics    Alcohol use: Yes     Comment: social    Drug use: No       Current Outpatient Medications   Medication Sig    cholestyramine (QUESTRAN) 4 gram packet MIX THE CONTENTS OF ONE PACKET UTD AND TK BID.    fluconazole (DIFLUCAN) 200 MG Tab TAKE 1 TABLET (200 MG TOTAL) BY MOUTH ONCE. TAKE 1 TAB EVERY OTHER DAY X 3 DOSES.    gabapentin (NEURONTIN) 600 MG tablet Take 600 mg by mouth 3 (three) times daily.    ibuprofen (ADVIL,MOTRIN) 600 MG tablet Take 1 tablet (600 mg total) by mouth every 6 (six) hours as needed for Pain.    LIDOTRAL 3.88 % Crea APPLY TO THE AFFECTED AREA 2 TIMES TO 3 TIMES DAILY    naproxen sodium (ANAPROX) 220 MG tablet Take 220 mg by mouth every 12 (twelve) hours.    triamcinolone acetonide 0.1% (KENALOG) 0.1 % ointment     VIVA PATCH 2.5-4-2 % PtMd APPLY UP TO 3 PATCHES TO THE AFFECTED AREA TWICE DAILY     No current facility-administered medications for this visit.          Review of Systems:  Review of Systems   Constitutional: Negative for chills, diaphoresis, fatigue and fever.   Respiratory: Negative for cough and shortness of breath.    Cardiovascular: Negative for chest pain and palpitations.   Gastrointestinal: Negative for abdominal pain, constipation, diarrhea, nausea and vomiting.   Genitourinary: Positive for menorrhagia, menstrual problem, pelvic pain and vaginal bleeding. Negative for dysmenorrhea, dyspareunia, dysuria, vaginal discharge and vaginal pain.   Musculoskeletal: Negative for back pain and myalgias.   Integumentary:  Negative for rash, acne, breast mass, nipple discharge and breast skin changes.   Neurological: Negative for headaches.   Psychiatric/Behavioral: Negative for depression. The patient is not nervous/anxious.    Breast: Negative for mass, mastodynia, nipple discharge and skin changes       OBJECTIVE:     Physical Exam:  Physical Exam   Constitutional: She is oriented to person,  place, and time. She appears well-developed and well-nourished. No distress.   HENT:   Head: Normocephalic and atraumatic.   Eyes: EOM are normal.   Neck: Normal range of motion.   Cardiovascular: Normal rate, regular rhythm and normal heart sounds. Exam reveals no gallop and no friction rub.   No murmur heard.  Pulmonary/Chest: Effort normal. No respiratory distress. She has no wheezes. She has no rales.   Abdominal: Soft. She exhibits no distension and no mass. There is no tenderness. There is no rebound and no guarding. No hernia.   Genitourinary: No vaginal discharge found.   Genitourinary Comments: See previous notes.   Musculoskeletal: Normal range of motion. She exhibits no edema.   Neurological: She is alert and oriented to person, place, and time.   Skin: Skin is warm. No rash noted.   Psychiatric: She has a normal mood and affect. Her behavior is normal. Judgment and thought content normal.         ASSESSMENT:       ICD-10-CM ICD-9-CM    1. Abnormal uterine bleeding (AUB) N93.9 626.9 [place] to [place]      Notify Physician/Vital Signs Parameters Urine output less than 0.5mL/kg/hr (with indwelling catheter) or 30 mL/hr (without indwelling catheter) or blood glucose greater than 200 mg/dL      Notify physician      Notify Physician - Potential Need of Opioid Reversal      Full code      Place in Outpatient      Vital signs      Diet NPO      Place sequential compression device      POCT urine pregnancy          Plan:      Gloria was seen today for pre-op exam.    Diagnoses and all orders for this visit:    Abnormal uterine bleeding (AUB)  To OR on 3/13/19 for hysteroscopy/D&C/Novasure ablation  Consents signed. Risks, benefits, alternatives discussed.  Specifically counseled patient that procedure may not eliminate menses, and may not improve pain. Pain may be worsened after the procedure.  Discussed option of leaving Nexplanon in place until after ablation, as she may get beneficial effects from progestin  treatment. If AUB persistent after ablation, will consider Nexplanon removal.  To Preop  -     [place] to [place]; Standing  -     Notify Physician/Vital Signs Parameters Urine output less than 0.5mL/kg/hr (with indwelling catheter) or 30 mL/hr (without indwelling catheter) or blood glucose greater than 200 mg/dL; Standing  -     Notify physician; Standing  -     Notify Physician - Potential Need of Opioid Reversal; Standing  -     Full code; Standing  -     Place in Outpatient; Standing  -     Vital signs; Standing  -     Diet NPO; Standing  -     Place sequential compression device; Standing  -     POCT urine pregnancy; Standing    Other orders  -     Progressive Mobility Protocol (mobilize patient to their highest level of functioning at least twice daily); Standing  -     IP VTE LOW RISK PATIENT; Standing    No orders of the defined types were placed in this encounter.      Follow-up in about 6 weeks (around 4/19/2019) for post op.    Counseling time: 15 minutes    Geneva Orr

## 2019-03-08 NOTE — PROGRESS NOTES
History & Physical  Gynecology      SUBJECTIVE:     Chief Complaint: Pre-op Exam       History of Present Illness:  42 y/o  presents for surgery workup for scheduled hysteroscopy/D&C/Novasure ablation for AUB. Patient has Nexplanon in place, had been having amenorrhea but two months ago had daily bleeding for one month. EMBx was benign. She does have history of pelvic pain, but U/S in past 4-5 months have noted a resolved 4 cm simple ovarian cyst. She does not desire hysterectomy at this time. She has tried treatments with estrace and NSAIDs which has not helped. At previous visits, discussed additional medical management as well, and patient declines.    Pelvic U/S:  Uterus:    Size: 6.6 x 5.3 x 6.7 cm    Masses: Multiple heterogeneous masses are noted throughout the intramural uterus.  For example in the uterine fundus there is a 2.8 x 2.6 x 3.2 cm heterogeneous mass.    Endometrium: Normal in this pre menopausal patient, measuring 3 mm.    Right ovary:    Not visualized    Left ovary:    Size: 2.8 x 1.4 x 2.5 cm    Appearance: Normal    Vascular Flow: Normal.    Free Fluid:    None.    ENDOMETRIUM, BIOPSY:  -Scant fragment of endometrium with stromal decidualization and gland atrophy suggestive of exogenous progestin  effect.  -No hyperplasia or malignancy  -Multiple fragments of benign unremarkable endocervical epithelium.    Review of patient's allergies indicates:  No Known Allergies    Past Medical History:   Diagnosis Date    Ankle pain     Low back pain      Past Surgical History:   Procedure Laterality Date    CHOLECYSTECTOMY      INSERTION OF CONTRACEPTIVE CAPSULE  2012    NEXPLANON REMOVED     OB History      Para Term  AB Living    2 2       2    SAB TAB Ectopic Multiple Live Births            2        Family History   Problem Relation Age of Onset    Breast cancer Neg Hx     Ovarian cancer Neg Hx     Colon cancer Neg Hx      Social History     Tobacco Use    Smoking  status: Never Smoker    Smokeless tobacco: Never Used   Substance Use Topics    Alcohol use: Yes     Comment: social    Drug use: No       Current Outpatient Medications   Medication Sig    cholestyramine (QUESTRAN) 4 gram packet MIX THE CONTENTS OF ONE PACKET UTD AND TK BID.    fluconazole (DIFLUCAN) 200 MG Tab TAKE 1 TABLET (200 MG TOTAL) BY MOUTH ONCE. TAKE 1 TAB EVERY OTHER DAY X 3 DOSES.    gabapentin (NEURONTIN) 600 MG tablet Take 600 mg by mouth 3 (three) times daily.    ibuprofen (ADVIL,MOTRIN) 600 MG tablet Take 1 tablet (600 mg total) by mouth every 6 (six) hours as needed for Pain.    LIDOTRAL 3.88 % Crea APPLY TO THE AFFECTED AREA 2 TIMES TO 3 TIMES DAILY    naproxen sodium (ANAPROX) 220 MG tablet Take 220 mg by mouth every 12 (twelve) hours.    triamcinolone acetonide 0.1% (KENALOG) 0.1 % ointment     VIVA PATCH 2.5-4-2 % PtMd APPLY UP TO 3 PATCHES TO THE AFFECTED AREA TWICE DAILY     No current facility-administered medications for this visit.          Review of Systems:  Review of Systems   Constitutional: Negative for chills, diaphoresis, fatigue and fever.   Respiratory: Negative for cough and shortness of breath.    Cardiovascular: Negative for chest pain and palpitations.   Gastrointestinal: Negative for abdominal pain, constipation, diarrhea, nausea and vomiting.   Genitourinary: Positive for menorrhagia, menstrual problem, pelvic pain and vaginal bleeding. Negative for dysmenorrhea, dyspareunia, dysuria, vaginal discharge and vaginal pain.   Musculoskeletal: Negative for back pain and myalgias.   Integumentary:  Negative for rash, acne, breast mass, nipple discharge and breast skin changes.   Neurological: Negative for headaches.   Psychiatric/Behavioral: Negative for depression. The patient is not nervous/anxious.    Breast: Negative for mass, mastodynia, nipple discharge and skin changes       OBJECTIVE:     Physical Exam:  Physical Exam   Constitutional: She is oriented to person,  place, and time. She appears well-developed and well-nourished. No distress.   HENT:   Head: Normocephalic and atraumatic.   Eyes: EOM are normal.   Neck: Normal range of motion.   Cardiovascular: Normal rate, regular rhythm and normal heart sounds. Exam reveals no gallop and no friction rub.   No murmur heard.  Pulmonary/Chest: Effort normal. No respiratory distress. She has no wheezes. She has no rales.   Abdominal: Soft. She exhibits no distension and no mass. There is no tenderness. There is no rebound and no guarding. No hernia.   Genitourinary: No vaginal discharge found.   Genitourinary Comments: See previous notes.   Musculoskeletal: Normal range of motion. She exhibits no edema.   Neurological: She is alert and oriented to person, place, and time.   Skin: Skin is warm. No rash noted.   Psychiatric: She has a normal mood and affect. Her behavior is normal. Judgment and thought content normal.         ASSESSMENT:       ICD-10-CM ICD-9-CM    1. Abnormal uterine bleeding (AUB) N93.9 626.9 [place] to [place]      Notify Physician/Vital Signs Parameters Urine output less than 0.5mL/kg/hr (with indwelling catheter) or 30 mL/hr (without indwelling catheter) or blood glucose greater than 200 mg/dL      Notify physician      Notify Physician - Potential Need of Opioid Reversal      Full code      Place in Outpatient      Vital signs      Diet NPO      Place sequential compression device      POCT urine pregnancy          Plan:      Gloria was seen today for pre-op exam.    Diagnoses and all orders for this visit:    Abnormal uterine bleeding (AUB)  To OR on 3/13/19 for hysteroscopy/D&C/Novasure ablation  Consents signed. Risks, benefits, alternatives discussed.  Specifically counseled patient that procedure may not eliminate menses, and may not improve pain. Pain may be worsened after the procedure.  Discussed option of leaving Nexplanon in place until after ablation, as she may get beneficial effects from progestin  treatment. If AUB persistent after ablation, will consider Nexplanon removal.  To Preop  -     [place] to [place]; Standing  -     Notify Physician/Vital Signs Parameters Urine output less than 0.5mL/kg/hr (with indwelling catheter) or 30 mL/hr (without indwelling catheter) or blood glucose greater than 200 mg/dL; Standing  -     Notify physician; Standing  -     Notify Physician - Potential Need of Opioid Reversal; Standing  -     Full code; Standing  -     Place in Outpatient; Standing  -     Vital signs; Standing  -     Diet NPO; Standing  -     Place sequential compression device; Standing  -     POCT urine pregnancy; Standing    Other orders  -     Progressive Mobility Protocol (mobilize patient to their highest level of functioning at least twice daily); Standing  -     IP VTE LOW RISK PATIENT; Standing    No orders of the defined types were placed in this encounter.      Follow-up in about 6 weeks (around 4/19/2019) for post op.    Counseling time: 15 minutes    Geneva Orr

## 2019-03-12 ENCOUNTER — TELEPHONE (OUTPATIENT)
Dept: OBSTETRICS AND GYNECOLOGY | Facility: CLINIC | Age: 44
End: 2019-03-12

## 2019-03-13 ENCOUNTER — HOSPITAL ENCOUNTER (OUTPATIENT)
Facility: OTHER | Age: 44
Discharge: HOME | End: 2019-03-13
Attending: OBSTETRICS & GYNECOLOGY | Admitting: OBSTETRICS & GYNECOLOGY
Payer: COMMERCIAL

## 2019-03-13 ENCOUNTER — ANESTHESIA (OUTPATIENT)
Dept: SURGERY | Facility: OTHER | Age: 44
End: 2019-03-13
Payer: COMMERCIAL

## 2019-03-13 VITALS
SYSTOLIC BLOOD PRESSURE: 122 MMHG | HEART RATE: 55 BPM | DIASTOLIC BLOOD PRESSURE: 76 MMHG | WEIGHT: 210 LBS | OXYGEN SATURATION: 100 % | HEIGHT: 68 IN | TEMPERATURE: 97 F | BODY MASS INDEX: 31.83 KG/M2 | RESPIRATION RATE: 16 BRPM

## 2019-03-13 DIAGNOSIS — N93.9 ABNORMAL UTERINE BLEEDING (AUB): ICD-10-CM

## 2019-03-13 PROBLEM — N99.71 UTERINE PERFORATION BY UTERINE SOUND: Status: ACTIVE | Noted: 2019-03-13

## 2019-03-13 LAB
B-HCG UR QL: NEGATIVE
CTP QC/QA: YES

## 2019-03-13 PROCEDURE — 37000008 HC ANESTHESIA 1ST 15 MINUTES: Performed by: OBSTETRICS & GYNECOLOGY

## 2019-03-13 PROCEDURE — 58563 HYSTEROSCOPY ABLATION: CPT | Mod: 53,,, | Performed by: OBSTETRICS & GYNECOLOGY

## 2019-03-13 PROCEDURE — 25000003 PHARM REV CODE 250: Performed by: SPECIALIST

## 2019-03-13 PROCEDURE — 58563 PR HYSTEROSCOPY,W/ENDOMETRIAL ABLATION: ICD-10-PCS | Mod: 53,,, | Performed by: OBSTETRICS & GYNECOLOGY

## 2019-03-13 PROCEDURE — 36000707: Performed by: OBSTETRICS & GYNECOLOGY

## 2019-03-13 PROCEDURE — 71000015 HC POSTOP RECOV 1ST HR: Performed by: OBSTETRICS & GYNECOLOGY

## 2019-03-13 PROCEDURE — 25000003 PHARM REV CODE 250: Performed by: NURSE ANESTHETIST, CERTIFIED REGISTERED

## 2019-03-13 PROCEDURE — 63600175 PHARM REV CODE 636 W HCPCS: Performed by: NURSE ANESTHETIST, CERTIFIED REGISTERED

## 2019-03-13 PROCEDURE — 71000033 HC RECOVERY, INTIAL HOUR: Performed by: OBSTETRICS & GYNECOLOGY

## 2019-03-13 PROCEDURE — 37000009 HC ANESTHESIA EA ADD 15 MINS: Performed by: OBSTETRICS & GYNECOLOGY

## 2019-03-13 PROCEDURE — 27201423 OPTIME MED/SURG SUP & DEVICES STERILE SUPPLY: Performed by: OBSTETRICS & GYNECOLOGY

## 2019-03-13 PROCEDURE — 81025 URINE PREGNANCY TEST: CPT | Performed by: OBSTETRICS & GYNECOLOGY

## 2019-03-13 PROCEDURE — 63600175 PHARM REV CODE 636 W HCPCS: Performed by: ANESTHESIOLOGY

## 2019-03-13 PROCEDURE — 71000016 HC POSTOP RECOV ADDL HR: Performed by: OBSTETRICS & GYNECOLOGY

## 2019-03-13 PROCEDURE — 36000706: Performed by: OBSTETRICS & GYNECOLOGY

## 2019-03-13 RX ORDER — HYDROCODONE BITARTRATE AND ACETAMINOPHEN 5; 325 MG/1; MG/1
1 TABLET ORAL EVERY 4 HOURS PRN
Qty: 10 TABLET | Refills: 0 | Status: SHIPPED | OUTPATIENT
Start: 2019-03-13 | End: 2021-02-19

## 2019-03-13 RX ORDER — MEPERIDINE HYDROCHLORIDE 25 MG/ML
12.5 INJECTION INTRAMUSCULAR; INTRAVENOUS; SUBCUTANEOUS ONCE AS NEEDED
Status: DISCONTINUED | OUTPATIENT
Start: 2019-03-13 | End: 2019-03-13 | Stop reason: HOSPADM

## 2019-03-13 RX ORDER — IBUPROFEN 600 MG/1
600 TABLET ORAL EVERY 6 HOURS PRN
Status: DISCONTINUED | OUTPATIENT
Start: 2019-03-13 | End: 2019-03-13 | Stop reason: HOSPADM

## 2019-03-13 RX ORDER — FLUMAZENIL 0.1 MG/ML
INJECTION INTRAVENOUS
Status: DISCONTINUED | OUTPATIENT
Start: 2019-03-13 | End: 2019-03-13

## 2019-03-13 RX ORDER — LIDOCAINE HYDROCHLORIDE 10 MG/ML
0.5 INJECTION, SOLUTION EPIDURAL; INFILTRATION; INTRACAUDAL; PERINEURAL ONCE
Status: DISCONTINUED | OUTPATIENT
Start: 2019-03-13 | End: 2019-03-13 | Stop reason: HOSPADM

## 2019-03-13 RX ORDER — FENTANYL CITRATE 50 UG/ML
INJECTION, SOLUTION INTRAMUSCULAR; INTRAVENOUS
Status: DISCONTINUED | OUTPATIENT
Start: 2019-03-13 | End: 2019-03-13

## 2019-03-13 RX ORDER — SODIUM CHLORIDE, SODIUM LACTATE, POTASSIUM CHLORIDE, CALCIUM CHLORIDE 600; 310; 30; 20 MG/100ML; MG/100ML; MG/100ML; MG/100ML
INJECTION, SOLUTION INTRAVENOUS CONTINUOUS
Status: DISCONTINUED | OUTPATIENT
Start: 2019-03-13 | End: 2019-03-13 | Stop reason: HOSPADM

## 2019-03-13 RX ORDER — MIDAZOLAM HYDROCHLORIDE 5 MG/ML
5 INJECTION INTRAMUSCULAR; INTRAVENOUS
Status: DISCONTINUED | OUTPATIENT
Start: 2019-03-13 | End: 2019-03-13 | Stop reason: HOSPADM

## 2019-03-13 RX ORDER — ONDANSETRON 8 MG/1
8 TABLET, ORALLY DISINTEGRATING ORAL EVERY 8 HOURS PRN
Status: DISCONTINUED | OUTPATIENT
Start: 2019-03-13 | End: 2019-03-13 | Stop reason: HOSPADM

## 2019-03-13 RX ORDER — DIPHENHYDRAMINE HCL 25 MG
25 CAPSULE ORAL EVERY 4 HOURS PRN
Status: DISCONTINUED | OUTPATIENT
Start: 2019-03-13 | End: 2019-03-13 | Stop reason: HOSPADM

## 2019-03-13 RX ORDER — OXYCODONE HYDROCHLORIDE 5 MG/1
5 TABLET ORAL
Status: DISCONTINUED | OUTPATIENT
Start: 2019-03-13 | End: 2019-03-13 | Stop reason: HOSPADM

## 2019-03-13 RX ORDER — OXYCODONE HYDROCHLORIDE 5 MG/1
5 TABLET ORAL ONCE AS NEEDED
Status: COMPLETED | OUTPATIENT
Start: 2019-03-13 | End: 2019-03-13

## 2019-03-13 RX ORDER — HYDROMORPHONE HYDROCHLORIDE 2 MG/ML
0.4 INJECTION, SOLUTION INTRAMUSCULAR; INTRAVENOUS; SUBCUTANEOUS EVERY 5 MIN PRN
Status: DISCONTINUED | OUTPATIENT
Start: 2019-03-13 | End: 2019-03-13 | Stop reason: HOSPADM

## 2019-03-13 RX ORDER — HYDROCODONE BITARTRATE AND ACETAMINOPHEN 10; 325 MG/1; MG/1
1 TABLET ORAL EVERY 4 HOURS PRN
Status: DISCONTINUED | OUTPATIENT
Start: 2019-03-13 | End: 2019-03-13 | Stop reason: HOSPADM

## 2019-03-13 RX ORDER — HYDROCODONE BITARTRATE AND ACETAMINOPHEN 5; 325 MG/1; MG/1
1 TABLET ORAL EVERY 4 HOURS PRN
Status: DISCONTINUED | OUTPATIENT
Start: 2019-03-13 | End: 2019-03-13 | Stop reason: HOSPADM

## 2019-03-13 RX ORDER — IBUPROFEN 600 MG/1
600 TABLET ORAL EVERY 6 HOURS PRN
Qty: 30 TABLET | Refills: 0 | Status: SHIPPED | OUTPATIENT
Start: 2019-03-13 | End: 2021-02-19

## 2019-03-13 RX ORDER — GLYCOPYRROLATE 0.2 MG/ML
INJECTION INTRAMUSCULAR; INTRAVENOUS
Status: DISCONTINUED | OUTPATIENT
Start: 2019-03-13 | End: 2019-03-13

## 2019-03-13 RX ORDER — ONDANSETRON 2 MG/ML
4 INJECTION INTRAMUSCULAR; INTRAVENOUS DAILY PRN
Status: DISCONTINUED | OUTPATIENT
Start: 2019-03-13 | End: 2019-03-13 | Stop reason: HOSPADM

## 2019-03-13 RX ORDER — FAMOTIDINE 20 MG/1
20 TABLET, FILM COATED ORAL
Status: COMPLETED | OUTPATIENT
Start: 2019-03-13 | End: 2019-03-13

## 2019-03-13 RX ORDER — SODIUM CHLORIDE 0.9 % (FLUSH) 0.9 %
3 SYRINGE (ML) INJECTION
Status: DISCONTINUED | OUTPATIENT
Start: 2019-03-13 | End: 2019-03-13 | Stop reason: HOSPADM

## 2019-03-13 RX ORDER — MIDAZOLAM HYDROCHLORIDE 1 MG/ML
INJECTION INTRAMUSCULAR; INTRAVENOUS
Status: DISCONTINUED | OUTPATIENT
Start: 2019-03-13 | End: 2019-03-13

## 2019-03-13 RX ORDER — FENTANYL CITRATE 50 UG/ML
25 INJECTION, SOLUTION INTRAMUSCULAR; INTRAVENOUS EVERY 5 MIN PRN
Status: COMPLETED | OUTPATIENT
Start: 2019-03-13 | End: 2019-03-13

## 2019-03-13 RX ORDER — LIDOCAINE HCL/PF 100 MG/5ML
SYRINGE (ML) INTRAVENOUS
Status: DISCONTINUED | OUTPATIENT
Start: 2019-03-13 | End: 2019-03-13

## 2019-03-13 RX ORDER — DIPHENHYDRAMINE HYDROCHLORIDE 50 MG/ML
25 INJECTION INTRAMUSCULAR; INTRAVENOUS EVERY 4 HOURS PRN
Status: DISCONTINUED | OUTPATIENT
Start: 2019-03-13 | End: 2019-03-13 | Stop reason: HOSPADM

## 2019-03-13 RX ORDER — ONDANSETRON 2 MG/ML
INJECTION INTRAMUSCULAR; INTRAVENOUS
Status: DISCONTINUED | OUTPATIENT
Start: 2019-03-13 | End: 2019-03-13

## 2019-03-13 RX ADMIN — FENTANYL CITRATE 100 MCG: 50 INJECTION, SOLUTION INTRAMUSCULAR; INTRAVENOUS at 08:03

## 2019-03-13 RX ADMIN — FENTANYL CITRATE 25 MCG: 50 INJECTION, SOLUTION INTRAMUSCULAR; INTRAVENOUS at 09:03

## 2019-03-13 RX ADMIN — OXYCODONE HYDROCHLORIDE 5 MG: 5 TABLET ORAL at 09:03

## 2019-03-13 RX ADMIN — MIDAZOLAM HYDROCHLORIDE 2 MG: 1 INJECTION, SOLUTION INTRAMUSCULAR; INTRAVENOUS at 08:03

## 2019-03-13 RX ADMIN — GLYCOPYRROLATE 0.2 MG: 0.2 INJECTION, SOLUTION INTRAMUSCULAR; INTRAVENOUS at 08:03

## 2019-03-13 RX ADMIN — ONDANSETRON 4 MG: 2 INJECTION INTRAMUSCULAR; INTRAVENOUS at 09:03

## 2019-03-13 RX ADMIN — FLUMAZENIL 0.25 MG: 0.1 INJECTION, SOLUTION INTRAVENOUS at 09:03

## 2019-03-13 RX ADMIN — SODIUM CHLORIDE, SODIUM LACTATE, POTASSIUM CHLORIDE, AND CALCIUM CHLORIDE: 600; 310; 30; 20 INJECTION, SOLUTION INTRAVENOUS at 08:03

## 2019-03-13 RX ADMIN — CARBOXYMETHYLCELLULOSE SODIUM 2 DROP: 2.5 SOLUTION/ DROPS OPHTHALMIC at 08:03

## 2019-03-13 RX ADMIN — LIDOCAINE HYDROCHLORIDE 75 MG: 20 INJECTION, SOLUTION INTRAVENOUS at 08:03

## 2019-03-13 RX ADMIN — FAMOTIDINE 20 MG: 20 TABLET, FILM COATED ORAL at 07:03

## 2019-03-13 NOTE — DISCHARGE SUMMARY
[facility]  Brief Op Note/Discharge Note  Short Stay    Admit Date: 3/13/2019    Discharge Date: 03/13/2019    Attending Physician: Geneva Orr MD     Surgery Date: 3/13/2019     Surgeon(s) and Role:     * Geneva Orr MD - Primary     * Sommer West MD - Resident - Assisting    Pre-op Diagnosis:  Abnormal uterine bleeding (AUB) [N93.9]    Post-op Diagnosis:  Post-Op Diagnosis Codes:     * Abnormal uterine bleeding (AUB) [N93.9]       Uterine perforation    Procedure(s) (LRB):  HYSTEROSCOPY    Anesthesia: Choice    Findings/Key Components:   - Uterus easily sounded to 12 cm, cervix normal and with poor descensus toward introitus  - Hysteroscopy revealed 2-3 cm submucosal fibroid on posterior uterine wall as well as a tract at the right aspect of the fundus. Fluid deficit rapidly increased to 1205 ml consistent with perforation therefore procedure was aborted.  - Curettage and ablation not performed.   - Suspect that patient has adenomyosis given AUB and very soft nature of the uterine wall.    Estimated Blood Loss: 5 ml         Specimens:   Specimen (12h ago, onward)    None          Discharge Provider: Sommer West    Diagnoses:  Active Hospital Problems    Diagnosis  POA    *Abnormal uterine bleeding (AUB) [N93.9]  Yes    Uterine perforation by uterine sound [S37.69XA]  Unknown      Resolved Hospital Problems   No resolved problems to display.       Discharged Condition: fair    Hospital Course:   Patient was admitted for outpatient procedure as above, and tolerated the procedure well with no complications. Please see operative report for further details. Following the procedure, the patient was awakened from anesthesia and transferred to the recovery area in stable condition. She was discharged to home once ambulating, voiding, tolerating PO intake, and pain was well-controlled. Patient was given routine post-op instructions and prescriptions for pain medication to take as needed.  Patient instructed to follow up with Dr. Orr in 6 weeks.    Final Diagnoses: Same as principal problem.    Disposition: Home or Self Care    Follow up/Patient Instructions:    Medications:  Reconciled Home Medications:      Medication List      START taking these medications    HYDROcodone-acetaminophen 5-325 mg per tablet  Commonly known as:  NORCO  Take 1 tablet by mouth every 4 (four) hours as needed for Pain.        CHANGE how you take these medications    ibuprofen 600 MG tablet  Commonly known as:  ADVIL,MOTRIN  Take 1 tablet (600 mg total) by mouth every 6 (six) hours as needed for Pain (cramping).  What changed:  reasons to take this        CONTINUE taking these medications    cholestyramine 4 gram packet  Commonly known as:  QUESTRAN  MIX THE CONTENTS OF ONE PACKET UTD AND TK BID.     fluconazole 200 MG Tab  Commonly known as:  DIFLUCAN  TAKE 1 TABLET (200 MG TOTAL) BY MOUTH ONCE. TAKE 1 TAB EVERY OTHER DAY X 3 DOSES.     gabapentin 600 MG tablet  Commonly known as:  NEURONTIN  Take 600 mg by mouth 3 (three) times daily.     LIDOTRAL 3.88 % Crea  Generic drug:  lidocaine HCl  APPLY TO THE AFFECTED AREA 2 TIMES TO 3 TIMES DAILY     triamcinolone acetonide 0.1% 0.1 % ointment  Commonly known as:  KENALOG     VIVA PATCH 2.5-4-2 % Ptmd  Generic drug:  lidocaine-methyl sal-camphor  APPLY UP TO 3 PATCHES TO THE AFFECTED AREA TWICE DAILY        STOP taking these medications    naproxen sodium 220 MG tablet  Commonly known as:  ANAPROX          Discharge Procedure Orders   Diet general     Other restrictions (specify):   Order Comments: Pelvic rest x2 weeks. Nothing in vagina -no sex, tampons, douching, etc.     Call MD for:  temperature >100.4     Call MD for:  persistent nausea and vomiting     Call MD for:  severe uncontrolled pain     Call MD for:  redness, tenderness, or signs of infection (pain, swelling, redness, odor or green/yellow discharge around incision site)     Call MD for:  difficulty breathing,  headache or visual disturbances     Call MD for:  persistent dizziness or light-headedness     Call MD for:   Order Comments: Heavy vaginal bleeding saturating more than 1 pad per hr for at least consecutive 2 hrs.     No dressing needed     Activity as tolerated     Follow-up Information     Geneva Orr MD. Schedule an appointment as soon as possible for a visit in 6 weeks.    Specialty:  Obstetrics and Gynecology  Why:  postoperative visit  Contact information:  [address] [facility] [address] [phone]                 Sommer West MD  OBGYN - PGY 4

## 2019-03-13 NOTE — OP NOTE
OPERATIVE REPORT    DATE 03/13/2019     PREOPERATIVE DIAGNOSIS  1. AUB  2. Uterine fibroid    POSTOPERATIVE DIAGNOSIS  1. AUB  2. Uterine fibroid  3. Uterine perforation    PROCEDURE:  1. Hysteroscopy  2. Cervical dilation    SURGEON: Geneva Orr MD    ASSISTANT: Sommer West MD (RES)    ANESTHESIA: General    COMPLICATIONS: Uterine perforation with sound    EBL: 5 cc    IV FLUIDS: 400  cc    URINE OUTPUT: 100  cc drained via in-and-out catheterization prior to procedure    Hysteroscopy fluid deficit: 1205 cc    FINDINGS:    - Uterus easily sounded to 12 cm, cervix normal and with poor descensus toward introitus  - Hysteroscopy revealed 2-3 cm submucosal fibroid on posterior uterine wall as well as a tract at the right aspect of the fundus. Fluid deficit rapidly increased to 1205 ml consistent with perforation therefore procedure was aborted.  - Curettage and ablation not performed.   - Suspect that patient has adenomyosis given AUB and very soft nature of the uterine wall.    SPECIMENS: none    PROCEDURE:   Patient was taken to the operating room where general anesthesia was administered and found to be adequate.  She was placed in the dorsal lithotomy position using Jeromy stirrups, then prepped and draped in the usual sterile fashion. Bladder was drained via in-and-out catheterization prior to procedure.  A surgical timeout was performed with patient's name, date of birth, procedure to be performed, and allergies verbalized. All OR staff in agreement. No preoperative antibiotics were administered as none were indicated.    Attention was then turned to the vagina where a weighted sterile speculum was placed in the posterior aspect, and a right angle retractor was placed in the anterior aspect.  The anterior lip of the cervix was grasped with a single tooth tenaculum.  The uterus was easily sounded to approximately 12 cm which was more than expected from previous US. The cervix was serially dilated to #18 Yosvany.  The 5mm hysteroscope was advanced through the cervical os and the uterus was distended with normal saline.  The endometrium was inspected and found a posterior submucosal fibroid approximately 2-3 cm.  The tubal ostia were identified without difficulty, and appeared normal. A tract was noted at the right aspect of the fundus. As this was further inspected to evaluate for perforation, the fluid deficit rapidly increased to 1205 ml, confirming perforation. At this point, decision was made to abort the procedure. Neither curettage nor ablation were pursued. The hysteroscope was withdrawn without difficulty. The tenaculum was removed and hemostasis was noted at the puncture sites in the cervix. All instruments were removed from the vagina.    Sponge and instrument counts were correct x 2. The patient tolerated the procedure well and was awakened without difficulty. She was taken to the recovery room in stable condition.    Sommer West MD  OBGYN - PGY 4

## 2019-03-13 NOTE — TRANSFER OF CARE
"Anesthesia Transfer of Care Note    Patient: Gloria Chew    Procedure(s) Performed: Procedure(s) (LRB):  HYSTEROSCOPY, WITH DILATION AND CURETTAGE OF UTERUS (N/A)    Patient location: PACU    Anesthesia Type: general    Transport from OR: Transported from OR on 2-3 L/min O2 by NC with adequate spontaneous ventilation    Post pain: adequate analgesia    Post assessment: no apparent anesthetic complications    Post vital signs: stable    Level of consciousness: awake    Nausea/Vomiting: no nausea/vomiting    Complications: none    Transfer of care protocol was followed      Last vitals:   Visit Vitals  /74 (BP Location: Left arm, Patient Position: Lying)   Pulse 73   Temp 36.4 °C (97.5 °F) (Oral)   Resp 16   Ht 5' 8" (1.727 m)   Wt 95.3 kg (209 lb 16 oz)   LMP 03/13/2006   SpO2 100%   Breastfeeding? No   BMI 31.93 kg/m²     "

## 2019-03-13 NOTE — PLAN OF CARE
Gloriawhitney Chew has met all discharge criteria from Phase II. Vital Signs are stable, ambulating  without difficulty. Discharge instructions given, patient verbalized understanding. Discharged from facility via wheelchair in stable condition.

## 2019-03-13 NOTE — DISCHARGE INSTRUCTIONS

## 2019-03-13 NOTE — ANESTHESIA POSTPROCEDURE EVALUATION
"Anesthesia Post Evaluation    Patient: Gloria Chew    Procedure(s) Performed: Procedure(s) (LRB):  HYSTEROSCOPY, WITH DILATION AND CURETTAGE OF UTERUS (N/A)    Final Anesthesia Type: general  Patient location during evaluation: PACU  Patient participation: Yes- Able to Participate  Level of consciousness: awake and alert  Post-procedure vital signs: reviewed and stable  Pain management: adequate  Airway patency: patent  PONV status at discharge: No PONV  Anesthetic complications: no      Cardiovascular status: hemodynamically stable  Respiratory status: unassisted  Hydration status: euvolemic  Follow-up not needed.        Visit Vitals  /73 (BP Location: Left arm, Patient Position: Lying)   Pulse (!) 56   Temp 36.1 °C (97 °F) (Oral)   Resp 16   Ht 5' 8" (1.727 m)   Wt 95.3 kg (209 lb 16 oz)   LMP 03/13/2006   SpO2 98%   Breastfeeding? No   BMI 31.93 kg/m²       Pain/Demetrio Score: Pain Rating Prior to Med Admin: 7 (3/13/2019  9:56 AM)  Pain Rating Post Med Admin: 5 (3/13/2019 10:15 AM)  Demetrio Score: 9 (3/13/2019 10:28 AM)        "

## 2019-03-13 NOTE — INTERVAL H&P NOTE
The patient has been examined and the H&P has been reviewed:    I concur with the findings and no changes have occurred since H&P was written.      Active Hospital Problems    Diagnosis  POA    Abnormal uterine bleeding (AUB) [N93.9]  Yes      Resolved Hospital Problems   No resolved problems to display.     Sommer West MD  OBGYN - PGY 4

## 2019-03-14 ENCOUNTER — TELEPHONE (OUTPATIENT)
Dept: OBSTETRICS AND GYNECOLOGY | Facility: CLINIC | Age: 44
End: 2019-03-14

## 2019-03-14 NOTE — TELEPHONE ENCOUNTER
Called patient to review findings from surgery. Discussed that submucosal fibroid was noted, but due to uterine perforation, myomectomy and ablation was . Suspect adenomyosis. Reports that she had some bloating yesterday, but it has decreased today. Will discuss next steps further at next visit. All questions answered.

## 2019-03-14 NOTE — TELEPHONE ENCOUNTER
----- Message from Geneva Orr MD sent at 3/14/2019  1:27 PM CDT -----  Please schedule for 6 week post op appt

## 2019-04-23 ENCOUNTER — OFFICE VISIT (OUTPATIENT)
Dept: OBSTETRICS AND GYNECOLOGY | Facility: CLINIC | Age: 44
End: 2019-04-23
Payer: COMMERCIAL

## 2019-04-23 VITALS
SYSTOLIC BLOOD PRESSURE: 124 MMHG | BODY MASS INDEX: 31.68 KG/M2 | HEIGHT: 69 IN | WEIGHT: 213.88 LBS | DIASTOLIC BLOOD PRESSURE: 82 MMHG

## 2019-04-23 DIAGNOSIS — D25.9 UTERINE LEIOMYOMA, UNSPECIFIED LOCATION: ICD-10-CM

## 2019-04-23 DIAGNOSIS — R10.2 PELVIC PAIN: ICD-10-CM

## 2019-04-23 DIAGNOSIS — N93.9 ABNORMAL UTERINE BLEEDING (AUB): Primary | ICD-10-CM

## 2019-04-23 PROCEDURE — 99024 POSTOP FOLLOW-UP VISIT: CPT | Mod: S$GLB,,, | Performed by: OBSTETRICS & GYNECOLOGY

## 2019-04-23 PROCEDURE — 99999 PR PBB SHADOW E&M-EST. PATIENT-LVL III: ICD-10-PCS | Mod: PBBFAC,,, | Performed by: OBSTETRICS & GYNECOLOGY

## 2019-04-23 PROCEDURE — 99024 PR POST-OP FOLLOW-UP VISIT: ICD-10-PCS | Mod: S$GLB,,, | Performed by: OBSTETRICS & GYNECOLOGY

## 2019-04-23 PROCEDURE — 99999 PR PBB SHADOW E&M-EST. PATIENT-LVL III: CPT | Mod: PBBFAC,,, | Performed by: OBSTETRICS & GYNECOLOGY

## 2019-04-23 NOTE — PROGRESS NOTES
Subjective:       Patient ID: Gloria Chew is a 43 y.o. female.    Chief Complaint:  Post-op Evaluation    History of Present Illness:  HPI  Postoperative Follow-up  Patient presents to the clinic 6 weeks status post hysteroscopy/D&C for abnormal uterine bleeding and pelvic pain. Surgery noted large submucosal fibroid but was complicated by uterine perforation and therefore myomectomy could not be done. Eating a regular diet without difficulty. Bowel movements are normal. The patient is not having any post op pain. She stopped her cycle two days ago and is having some cramping. She would like to proceed with definitive surgical management with hysterectomy. She has a surgery planned at [facility] for foot surgery, just waiting on approval prior to scheduling. Will be off for about 3 months and is open to scheduling hysterectomy around that time so that she can recover concurrently.    GYN & OB History  No LMP recorded. (Menstrual status: Birth Control).   Date of Last Pap: 10/8/2018    OB History    Para Term  AB Living   2 2       2   SAB TAB Ectopic Multiple Live Births           2      # Outcome Date GA Lbr Dileep/2nd Weight Sex Delivery Anes PTL Lv   2 Para            1 Para                Review of Systems  Review of Systems   Constitutional: Negative for chills, diaphoresis, fatigue and fever.   Respiratory: Negative for cough and shortness of breath.    Cardiovascular: Negative for chest pain and palpitations.   Gastrointestinal: Negative for abdominal pain, constipation, diarrhea, nausea and vomiting.   Genitourinary: Positive for dysmenorrhea, menorrhagia, menstrual problem and pelvic pain. Negative for dyspareunia, dysuria, frequency, vaginal bleeding, vaginal discharge and vaginal pain.   Neurological: Negative for headaches.   Psychiatric/Behavioral: Negative for depression. The patient is not nervous/anxious.            Objective:    Physical Exam:   Constitutional: She is oriented to  person, place, and time. She appears well-developed and well-nourished. No distress.    HENT:   Head: Normocephalic and atraumatic.    Eyes: EOM are normal.    Neck: Normal range of motion.    Cardiovascular: Exam reveals no gallop, no friction rub, no clubbing and no edema.    No murmur heard.   Pulmonary/Chest: Effort normal.        Abdominal: Soft. She exhibits no distension, no mass and no abdominal incision. There is no tenderness.     Genitourinary:   Genitourinary Comments: Normal external female genitalia; vagina rugated, normal; cervix normal, no masses; uterus small mobile nontender; no adnexal masses palpated; rectal deferred             Musculoskeletal: Normal range of motion and moves all extremeties.       Neurological: She is alert and oriented to person, place, and time.    Skin: Skin is warm. No rash noted. Nails show no clubbing.    Psychiatric: She has a normal mood and affect. Her behavior is normal. Judgment and thought content normal.          Assessment:        1. Abnormal uterine bleeding (AUB)    2. Pelvic pain    3. Uterine leiomyoma, unspecified location             Plan:      Gloria was seen today for post-op evaluation.    Diagnoses and all orders for this visit:    Abnormal uterine bleeding (AUB)    Pelvic pain    Uterine leiomyoma, unspecified location    Reviewed findings from surgery with patient. Given uterine perforation at start of procedure, sampling/myomectomy was not done. Counseled patient that she may have adenomyosis. Given persistent AUB/pain despite medical management, patient would like to proceed with definitive surgical management with hysterectomy. She will reach out to me about available dates.    No orders of the defined types were placed in this encounter.      Follow up if symptoms worsen or fail to improve.

## 2019-05-06 ENCOUNTER — TELEPHONE (OUTPATIENT)
Dept: OBSTETRICS AND GYNECOLOGY | Facility: CLINIC | Age: 44
End: 2019-05-06

## 2019-05-06 NOTE — TELEPHONE ENCOUNTER
----- Message from Devaughn Middleton sent at 5/6/2019  3:34 PM CDT -----  Please call pt [phone]

## 2019-05-29 DIAGNOSIS — N93.9 ABNORMAL UTERINE BLEEDING (AUB): Primary | ICD-10-CM

## 2019-06-24 ENCOUNTER — TELEPHONE (OUTPATIENT)
Dept: OBSTETRICS AND GYNECOLOGY | Facility: CLINIC | Age: 44
End: 2019-06-24

## 2019-06-24 NOTE — TELEPHONE ENCOUNTER
----- Message from David Patten sent at 6/24/2019 11:34 AM CDT -----  Contact: CHUCHO CHICAS [[id number]]  Name of Who is Calling:  CHUCHO CHICAS [[id number]]    What is the request in detail: Pt called to reschedule her procedure. Contact pt at your earliest convenience.      Can the clinic reply by [facility]:     What [facility]: [phone]

## 2019-06-24 NOTE — TELEPHONE ENCOUNTER
Spoke with pt informed pt that Dr. Orr will not be in clinic on 06/27. Pt appointment rescheduled. Pt verbalized understanding.

## 2019-06-27 ENCOUNTER — ANESTHESIA EVENT (OUTPATIENT)
Dept: SURGERY | Facility: OTHER | Age: 44
End: 2019-06-27
Payer: COMMERCIAL

## 2019-06-27 ENCOUNTER — HOSPITAL ENCOUNTER (OUTPATIENT)
Dept: PREADMISSION TESTING | Facility: OTHER | Age: 44
Discharge: HOME | End: 2019-06-27
Attending: OBSTETRICS & GYNECOLOGY
Payer: COMMERCIAL

## 2019-06-27 VITALS
HEART RATE: 70 BPM | SYSTOLIC BLOOD PRESSURE: 115 MMHG | WEIGHT: 215 LBS | TEMPERATURE: 98 F | RESPIRATION RATE: 16 BRPM | HEIGHT: 68 IN | OXYGEN SATURATION: 98 % | BODY MASS INDEX: 32.58 KG/M2 | DIASTOLIC BLOOD PRESSURE: 71 MMHG

## 2019-06-27 DIAGNOSIS — Z01.818 PREOP TESTING: Primary | ICD-10-CM

## 2019-06-27 LAB
ABO + RH BLD: NORMAL
BASOPHILS # BLD AUTO: 0.01 K/UL (ref 0–0.2)
BASOPHILS NFR BLD: 0.2 % (ref 0–1.9)
BLD GP AB SCN CELLS X3 SERPL QL: NORMAL
DIFFERENTIAL METHOD: ABNORMAL
EOSINOPHIL # BLD AUTO: 0.2 K/UL (ref 0–0.5)
EOSINOPHIL NFR BLD: 4 % (ref 0–8)
ERYTHROCYTE [DISTWIDTH] IN BLOOD BY AUTOMATED COUNT: 14.4 % (ref 11.5–14.5)
HCT VFR BLD AUTO: 36.6 % (ref 37–48.5)
HGB BLD-MCNC: 11.8 G/DL (ref 12–16)
LYMPHOCYTES # BLD AUTO: 1.6 K/UL (ref 1–4.8)
LYMPHOCYTES NFR BLD: 28.7 % (ref 18–48)
MCH RBC QN AUTO: 29.8 PG (ref 27–31)
MCHC RBC AUTO-ENTMCNC: 32.2 G/DL (ref 32–36)
MCV RBC AUTO: 92 FL (ref 82–98)
MONOCYTES # BLD AUTO: 0.4 K/UL (ref 0.3–1)
MONOCYTES NFR BLD: 7.8 % (ref 4–15)
NEUTROPHILS # BLD AUTO: 3.3 K/UL (ref 1.8–7.7)
NEUTROPHILS NFR BLD: 59.3 % (ref 38–73)
PLATELET # BLD AUTO: 290 K/UL (ref 150–350)
PMV BLD AUTO: 9.9 FL (ref 9.2–12.9)
RBC # BLD AUTO: 3.96 M/UL (ref 4–5.4)
WBC # BLD AUTO: 5.51 K/UL (ref 3.9–12.7)

## 2019-06-27 PROCEDURE — 85025 COMPLETE CBC W/AUTO DIFF WBC: CPT

## 2019-06-27 PROCEDURE — 36415 COLL VENOUS BLD VENIPUNCTURE: CPT

## 2019-06-27 PROCEDURE — 86901 BLOOD TYPING SEROLOGIC RH(D): CPT

## 2019-06-27 RX ORDER — PREGABALIN 75 MG/1
75 CAPSULE ORAL
Status: CANCELLED | OUTPATIENT
Start: 2019-06-27 | End: 2019-06-27

## 2019-06-27 RX ORDER — FAMOTIDINE 20 MG/1
20 TABLET, FILM COATED ORAL
Status: CANCELLED | OUTPATIENT
Start: 2019-06-27 | End: 2019-06-27

## 2019-06-27 RX ORDER — SODIUM CHLORIDE, SODIUM LACTATE, POTASSIUM CHLORIDE, CALCIUM CHLORIDE 600; 310; 30; 20 MG/100ML; MG/100ML; MG/100ML; MG/100ML
INJECTION, SOLUTION INTRAVENOUS CONTINUOUS
Status: CANCELLED | OUTPATIENT
Start: 2019-06-27

## 2019-06-27 RX ORDER — ACETAMINOPHEN 500 MG
1000 TABLET ORAL
Status: CANCELLED | OUTPATIENT
Start: 2019-06-27 | End: 2019-06-27

## 2019-06-27 RX ORDER — SCOLOPAMINE TRANSDERMAL SYSTEM 1 MG/1
1 PATCH, EXTENDED RELEASE TRANSDERMAL ONCE
Status: CANCELLED | OUTPATIENT
Start: 2019-06-27 | End: 2019-06-27

## 2019-06-27 NOTE — DISCHARGE INSTRUCTIONS
PRE-ADMIT TESTING -  [phone]    [address]  [place] [facility]          Your surgery has been scheduled at [facility]. We are pleased to have the opportunity to serve you. For Further Information please call [phone].    On the day of surgery please report to the Information Desk on the 1st floor.    · CONTACT YOUR PHYSICIAN'S OFFICE THE DAY PRIOR TO YOUR SURGERY TO OBTAIN YOUR ARRIVAL TIME.     · The evening before surgery do not eat anything after 9 p.m. ( this includes hard candy, chewing gum and mints).  You may only have GATORADE, POWERADE AND WATER  from 9 p.m. until you leave your home.   DO NOT DRINK ANY LIQUIDS ON THE WAY TO THE HOSPITAL.      SPECIAL MEDICATION INSTRUCTIONS: TAKE medications checked off by the Anesthesiologist on your Medication List.    Angiogram Patients: Take medications as instructed by your physician, including aspirin.     Surgery Patients:    If you take ASPIRIN - Your PHYSICIAN/SURGEON will need to inform you IF/OR when you need to stop taking aspirin prior to your surgery.     Do Not take any medications containing IBUPROFEN.  Do Not Wear any make-up or dark nail polish   (especially eye make-up) to surgery. If you come to surgery with makeup on you will be required to remove the makeup or nail polish.    Do not shave your surgical area at least 5 days prior to your surgery. The surgical prep will be performed at the hospital according to Infection Control regulations.    Leave all valuables at home.   Do Not wear any jewelry or watches, including any metal in body piercings. Jewelry must be removed prior to coming to the hospital.  There is a possibility that rings that are unable to be removed may be cut off if they are on the surgical extremity.    Contact Lens must be removed before surgery. Either do not wear the contact lens or bring a case and solution for storage.  Please bring a container for eyeglasses or dentures as required.  Bring  any paperwork your physician has provided, such as consent forms,  history and physicals, doctor's orders, etc.   Bring comfortable clothes that are loose fitting to wear upon discharge. Take into consideration the type of surgery being performed.  Maintain your diet as advised per your physician the day prior to surgery.      Adequate rest the night before surgery is advised.   Park in the Parking lot behind the hospital or in the [facility] across the street from the parking lot. Parking is complimentary.  If you will be discharged the same day as your procedure, please arrange for a responsible adult to drive you home or to accompany you if traveling by taxi.   YOU WILL NOT BE PERMITTED TO DRIVE OR TO LEAVE THE HOSPITAL ALONE AFTER SURGERY.   It is strongly recommended that you arrange for someone to remain with you for the first 24 hrs following your surgery.    The Surgeon will speak to your family/visitor after your surgery regarding the outcome of your surgery and post op care.  The Surgeon may speak to you after your surgery, but there is a possibility you may not remember the details.  Please check with your family members regarding the conversation with the Surgeon.      Thank you for your cooperation.  The Staff of [facility].                Bathing Instructions with Hibiclens     Shower the evening before and morning of your procedure with Hibiclens:   Wash your face with water and your regular face wash/soap   Apply Hibiclens directly on your skin or on a wet washcloth and wash gently. When showering: Move away from the shower stream when applying Hibiclens to avoid rinsing off too soon.   Rinse thoroughly with warm water   Do not dilute Hibiclens         Dry off as usual, do not use any deodorant, powder, body lotions, perfume, after shave or cologne.

## 2019-06-27 NOTE — ANESTHESIA PREPROCEDURE EVALUATION
06/27/2019  Gloria Chew is a 43 y.o., female.    Anesthesia Evaluation    I have reviewed the Patient Summary Reports.    I have reviewed the Nursing Notes.   I have reviewed the Medications.     Review of Systems  Anesthesia Hx:  No problems with previous Anesthesia  Denies Family Hx of Anesthesia complications.   Denies Personal Hx of Anesthesia complications.   Social:  Social Alcohol Use, Non-Smoker    Hematology/Oncology:  Hematology Normal   Oncology Normal     EENT/Dental:EENT/Dental Normal   Cardiovascular:  Cardiovascular Normal Exercise tolerance: good     Pulmonary:  Pulmonary Normal    Renal/:  Renal/ Normal     Hepatic/GI:  Hepatic/GI Normal    Musculoskeletal:  Spine Disorders: Chronic Pain    Neurological:  Neurology Normal    Endocrine:  Endocrine Normal    Dermatological:  Skin Normal    Psych:  Psychiatric Normal           Physical Exam  General:  Obesity    Airway/Jaw/Neck:  Airway Findings: Mouth Opening: Normal Tongue: Normal  General Airway Assessment: Adult, Good  Mallampati: I  TM Distance: Normal, at least 6 cm  Jaw/Neck Findings:  Neck ROM: Normal ROM      Dental:  Dental Findings: upper braces, lower braces        Mental Status:  Mental Status Findings:  Cooperative, Alert and Oriented         Anesthesia Plan  Type of Anesthesia, risks & benefits discussed:  Anesthesia Type:  general  Patient's Preference:   Intra-op Monitoring Plan: standard ASA monitors  Intra-op Monitoring Plan Comments:   Post Op Pain Control Plan: per primary service following discharge from PACU  Post Op Pain Control Plan Comments:   Induction:   IV  Beta Blocker:         Informed Consent: Patient understands risks and agrees with Anesthesia plan.  Questions answered. Anesthesia consent signed with patient.  ASA Score: 1     Day of Surgery Review of History & Physical:    H&P update referred to the  surgeon.     Anesthesia Plan Notes: CBC today.        Ready For Surgery From Anesthesia Perspective.

## 2019-06-28 ENCOUNTER — OFFICE VISIT (OUTPATIENT)
Dept: OBSTETRICS AND GYNECOLOGY | Facility: CLINIC | Age: 44
End: 2019-06-28
Payer: COMMERCIAL

## 2019-06-28 VITALS — SYSTOLIC BLOOD PRESSURE: 130 MMHG | BODY MASS INDEX: 32.69 KG/M2 | HEIGHT: 68 IN | DIASTOLIC BLOOD PRESSURE: 70 MMHG

## 2019-06-28 DIAGNOSIS — N93.9 ABNORMAL UTERINE BLEEDING (AUB): Primary | ICD-10-CM

## 2019-06-28 PROCEDURE — 99999 PR PBB SHADOW E&M-EST. PATIENT-LVL II: ICD-10-PCS | Mod: PBBFAC,,, | Performed by: OBSTETRICS & GYNECOLOGY

## 2019-06-28 PROCEDURE — 99999 PR PBB SHADOW E&M-EST. PATIENT-LVL II: CPT | Mod: PBBFAC,,, | Performed by: OBSTETRICS & GYNECOLOGY

## 2019-06-28 PROCEDURE — 99499 NO LOS: ICD-10-PCS | Mod: S$GLB,,, | Performed by: OBSTETRICS & GYNECOLOGY

## 2019-06-28 PROCEDURE — 99499 UNLISTED E&M SERVICE: CPT | Mod: S$GLB,,, | Performed by: OBSTETRICS & GYNECOLOGY

## 2019-06-28 RX ORDER — GEL BASE NO.184
GEL (GRAM) TOPICAL
Refills: 3 | COMMUNITY
Start: 2019-06-14 | End: 2021-02-19

## 2019-06-28 RX ORDER — NAFTIFINE HYDROCHLORIDE 2 G/100G
GEL TOPICAL
COMMUNITY
Start: 2019-04-17 | End: 2021-02-19

## 2019-06-28 RX ORDER — AMOXICILLIN AND CLAVULANATE POTASSIUM 875; 125 MG/1; MG/1
TABLET, FILM COATED ORAL
COMMUNITY
Start: 2019-05-18 | End: 2021-02-19

## 2019-06-28 RX ORDER — RIFAXIMIN 550 MG/1
TABLET ORAL
Refills: 0 | COMMUNITY
Start: 2019-06-13 | End: 2021-02-19

## 2019-06-28 RX ORDER — CYCLOBENZAPRINE HCL 10 MG
TABLET ORAL
COMMUNITY
Start: 2019-03-22 | End: 2021-12-15

## 2019-06-28 RX ORDER — FLUOCINONIDE 1 MG/G
CREAM TOPICAL
Refills: 3 | COMMUNITY
Start: 2019-06-14 | End: 2021-02-19

## 2019-06-28 RX ORDER — KETOCONAZOLE 20 MG/G
1 CREAM TOPICAL 2 TIMES DAILY
Refills: 2 | COMMUNITY
Start: 2019-04-23 | End: 2021-02-19

## 2019-06-28 RX ORDER — MELOXICAM 15 MG/1
TABLET ORAL
COMMUNITY
Start: 2019-05-18 | End: 2021-02-19

## 2019-06-28 RX ORDER — ETODOLAC 400 MG/1
TABLET, FILM COATED ORAL
COMMUNITY
Start: 2019-03-22 | End: 2021-02-19

## 2019-06-28 RX ORDER — ONDANSETRON 8 MG/1
TABLET, ORALLY DISINTEGRATING ORAL
Refills: 1 | COMMUNITY
Start: 2019-06-08 | End: 2021-02-19

## 2019-06-28 RX ORDER — DEXAMETHASONE 1 MG/1
TABLET ORAL
COMMUNITY
Start: 2019-04-08 | End: 2021-02-19

## 2019-06-28 RX ORDER — OXYCODONE AND ACETAMINOPHEN 10; 325 MG/1; MG/1
TABLET ORAL
Refills: 0 | COMMUNITY
Start: 2019-06-08 | End: 2021-02-19

## 2019-06-28 RX ORDER — METHOCARBAMOL 750 MG/1
TABLET, FILM COATED ORAL
COMMUNITY
Start: 2019-05-18 | End: 2021-02-19

## 2019-06-28 RX ORDER — LIDOCAINE AND PRILOCAINE 25; 25 MG/G; MG/G
CREAM TOPICAL
Refills: 3 | COMMUNITY
Start: 2019-06-14 | End: 2021-02-19

## 2019-06-28 RX ORDER — DICLOFENAC SODIUM 16.05 MG/ML
SOLUTION TOPICAL
Refills: 3 | COMMUNITY
Start: 2019-06-14 | End: 2021-02-19

## 2019-06-28 NOTE — PROGRESS NOTES
History & Physical  Gynecology      SUBJECTIVE:     Chief Complaint: Pre-op Exam     History of Present Illness:  44 y/o  presents for preop exam for scheduled TLH/BS/cysto for AUB. She has tried Nexplanon without improvement of bleeding. She has had hysteroscopy/D&C for abnormal uterine bleeding and pelvic pain. Surgery noted large submucosal fibroid but was complicated by uterine perforation and therefore myomectomy could not be done. She desires definitive surgical management with hysterectomy.     Pelvic U/S:  Uterus:    Size: 6.6 x 5.3 x 6.7 cm    Masses: Multiple heterogeneous masses are noted throughout the intramural uterus.  For example in the uterine fundus there is a 2.8 x 2.6 x 3.2 cm heterogeneous mass.    Endometrium: Normal in this pre menopausal patient, measuring 3 mm.    Right ovary:    Not visualized    Left ovary:    Size: 2.8 x 1.4 x 2.5 cm    Appearance: Normal    Vascular Flow: Normal.    Free Fluid:    None.     ENDOMETRIUM, BIOPSY:  -Scant fragment of endometrium with stromal decidualization and gland atrophy suggestive of exogenous progestin  effect.  -No hyperplasia or malignancy  -Multiple fragments of benign unremarkable endocervical epithelium.    Review of patient's allergies indicates:  No Known Allergies    Past Medical History:   Diagnosis Date    Ankle pain     Low back pain      Past Surgical History:   Procedure Laterality Date    ANKLE SURGERY Left     screw    CHOLECYSTECTOMY      FOOT SURGERY Right     HYSTEROSCOPY, WITH DILATION AND CURETTAGE OF UTERUS N/A 3/13/2019    Performed by Geneva Orr MD at [facility] OR    INSERTION OF CONTRACEPTIVE CAPSULE  2012    NEXPLANON REMOVED     OB History        2    Para   2    Term                AB        Living   2       SAB        TAB        Ectopic        Multiple        Live Births   2               Family History   Problem Relation Age of Onset    Breast cancer Neg Hx     Ovarian cancer Neg Hx      Colon cancer Neg Hx      Social History     Tobacco Use    Smoking status: Never Smoker    Smokeless tobacco: Never Used   Substance Use Topics    Alcohol use: Yes     Comment: social    Drug use: No       Current Outpatient Medications   Medication Sig    amoxicillin-clavulanate 875-125mg (AUGMENTIN) 875-125 mg per tablet     cholestyramine (QUESTRAN) 4 gram packet MIX THE CONTENTS OF ONE PACKET UTD AND TK BID.    cyclobenzaprine (FLEXERIL) 10 MG tablet     dexAMETHasone (DECADRON) 1 MG Tab     diclofenac sodium 1.5 % Drop APPLY A THIN LAYER (UP TO 3-4ML) TO THE AFFECTED AREAS 3-4 TIMES DAILY STARTING 2 WEEKS POST SURGERY.    etodolac (LODINE) 400 MG tablet     fluconazole (DIFLUCAN) 200 MG Tab TAKE 1 TABLET (200 MG TOTAL) BY MOUTH ONCE. TAKE 1 TAB EVERY OTHER DAY X 3 DOSES.    fluocinonide 0.1 % Crea APPLY A THIN LAYER (UP TO 2 GRAMS) TO THE AFFECTED AREA 3-4 TIMES DAILY FOR NO MORE THAN 15 CONSECUTIVE DAYS STARTING 1 MONTH POST SURGERY.    gabapentin (NEURONTIN) 600 MG tablet Take 600 mg by mouth 3 (three) times daily.    HYDROcodone-acetaminophen (NORCO) 5-325 mg per tablet Take 1 tablet by mouth every 4 (four) hours as needed for Pain.    ibuprofen (ADVIL,MOTRIN) 600 MG tablet Take 1 tablet (600 mg total) by mouth every 6 (six) hours as needed for Pain (cramping).    ketoconazole (NIZORAL) 2 % cream 1 application 2 (two) times daily. Apply to affected area    lidocaine-prilocaine (EMLA) cream APPLY 1-2 GRAMS TO THE AFFECTED AREAS 3-4 TIMES DAILY STARTING 2 WEEKS POST SURGERY.    LIDOTRAL 3.88 % Crea APPLY TO THE AFFECTED AREA 2 TIMES TO 3 TIMES DAILY    meloxicam (MOBIC) 15 MG tablet     methocarbamol (ROBAXIN) 750 MG Tab     NAFTIN 2 % Gel     ondansetron (ZOFRAN-ODT) 8 MG TbDL DISSOLVE 1 TABLET BY MOUTH EVERY 4-6 HRS AS NEEDED FOR NAUSEA    oxyCODONE-acetaminophen (PERCOCET)  mg per tablet TAKE 1 TABLET BY MOUTH EVERY 4-6 HRS AS NEEDED FOR POST-OP PAIN    SANARE ADV SCAR  THERAPY BASE Gel APPLY 1/2 GRAM (1 PUMP) TO AFFECTED AREAS TWICE DAILY STARTING 1 MONTH POST SURGERY    triamcinolone acetonide 0.1% (KENALOG) 0.1 % ointment     VIVA PATCH 2.5-4-2 % PtMd APPLY UP TO 3 PATCHES TO THE AFFECTED AREA TWICE DAILY    XIFAXAN 550 mg Tab TAKE 1 TABLET BY MOUTH THREE TIMES A DAY FOR 2 WEEKS     No current facility-administered medications for this visit.          Review of Systems:  Review of Systems   Constitutional: Negative for chills, diaphoresis, fatigue and fever.   Respiratory: Negative for cough and shortness of breath.    Cardiovascular: Negative for chest pain and palpitations.   Gastrointestinal: Negative for abdominal pain, constipation, diarrhea, nausea and vomiting.   Genitourinary: Positive for dysmenorrhea, menorrhagia, menstrual problem and vaginal bleeding. Negative for dyspareunia, pelvic pain, vaginal discharge and vaginal pain.   Musculoskeletal: Negative for back pain and myalgias.   Integumentary:  Negative for rash and acne.   Neurological: Negative for headaches.   Psychiatric/Behavioral: Negative for depression. The patient is not nervous/anxious.         OBJECTIVE:     Physical Exam:  Physical Exam   Constitutional: She is oriented to person, place, and time. She appears well-developed and well-nourished. No distress.   HENT:   Head: Normocephalic and atraumatic.   Eyes: EOM are normal.   Neck: Normal range of motion. Neck supple. No thyromegaly present.   Cardiovascular: Normal rate, regular rhythm and normal heart sounds.   Pulmonary/Chest: Effort normal and breath sounds normal. No respiratory distress.   Abdominal: Soft. Bowel sounds are normal. She exhibits no distension and no mass. There is no tenderness. There is no rebound and no guarding. No hernia.   Genitourinary:   Genitourinary Comments: See prior notes   Musculoskeletal: Normal range of motion. She exhibits no edema.   Neurological: She is alert and oriented to person, place, and time.   Skin:  Skin is warm. No rash noted.   Psychiatric: She has a normal mood and affect. Her behavior is normal. Judgment and thought content normal.   Nursing note and vitals reviewed.        ASSESSMENT:       ICD-10-CM ICD-9-CM    1. Abnormal uterine bleeding (AUB) N93.9 626.9 Notify Physician/Vital Signs Parameters Urine output less than 0.5mL/kg/hr (with indwelling catheter) or 30 mL/hr (without indwelling catheter) or blood glucose greater than 200 mg/dL      Notify physician      Notify Physician - Potential Need of Opioid Reversal      Full code      Place in Outpatient      [place] to [place]      Diet NPO      Place sequential compression device      POCT urine pregnancy          Plan:      Gloria was seen today for pre-op exam.    Diagnoses and all orders for this visit:    Abnormal uterine bleeding (AUB)  - Case scheduled for 7/9 at 830.   - Consents signed. Risks, benefits, alternatives discussed. Discussed ovarian conservation unless abnormal appearance.  - Preop appt done.   -     Notify Physician/Vital Signs Parameters Urine output less than 0.5mL/kg/hr (with indwelling catheter) or 30 mL/hr (without indwelling catheter) or blood glucose greater than 200 mg/dL; Standing  -     Notify physician; Standing  -     Notify Physician - Potential Need of Opioid Reversal; Standing  -     Full code; Standing  -     Place in Outpatient; Standing  -     [place] to [place]; Standing  -     Diet NPO; Standing  -     Place sequential compression device; Standing  -     POCT urine pregnancy; Standing    Other orders  -     IP VTE LOW RISK PATIENT; Standing  -     Progressive Mobility Protocol (mobilize patient to their highest level of functioning at least twice daily); Standing  -     ceFAZolin (ANCEF) 2 g in dextrose 5 % 50 mL IVPB    No orders of the defined types were placed in this encounter.      Follow up in about 6 weeks (around 8/9/2019) for post op.    Counseling time: 15 minutes    Geneva Orr

## 2019-06-28 NOTE — H&P (VIEW-ONLY)
History & Physical  Gynecology      SUBJECTIVE:     Chief Complaint: Pre-op Exam     History of Present Illness:  42 y/o  presents for preop exam for scheduled TLH/BS/cysto for AUB. She has tried Nexplanon without improvement of bleeding. She has had hysteroscopy/D&C for abnormal uterine bleeding and pelvic pain. Surgery noted large submucosal fibroid but was complicated by uterine perforation and therefore myomectomy could not be done. She desires definitive surgical management with hysterectomy.     Pelvic U/S:  Uterus:    Size: 6.6 x 5.3 x 6.7 cm    Masses: Multiple heterogeneous masses are noted throughout the intramural uterus.  For example in the uterine fundus there is a 2.8 x 2.6 x 3.2 cm heterogeneous mass.    Endometrium: Normal in this pre menopausal patient, measuring 3 mm.    Right ovary:    Not visualized    Left ovary:    Size: 2.8 x 1.4 x 2.5 cm    Appearance: Normal    Vascular Flow: Normal.    Free Fluid:    None.     ENDOMETRIUM, BIOPSY:  -Scant fragment of endometrium with stromal decidualization and gland atrophy suggestive of exogenous progestin  effect.  -No hyperplasia or malignancy  -Multiple fragments of benign unremarkable endocervical epithelium.    Review of patient's allergies indicates:  No Known Allergies    Past Medical History:   Diagnosis Date    Ankle pain     Low back pain      Past Surgical History:   Procedure Laterality Date    ANKLE SURGERY Left     screw    CHOLECYSTECTOMY      FOOT SURGERY Right     HYSTEROSCOPY, WITH DILATION AND CURETTAGE OF UTERUS N/A 3/13/2019    Performed by [facility] at [facility]    INSERTION OF CONTRACEPTIVE CAPSULE  2012    NEXPLANON REMOVED     OB History        2    Para   2    Term                AB        Living   2       SAB        TAB        Ectopic        Multiple        Live Births   2               Family History   Problem Relation Age of Onset    Breast cancer Neg Hx     Ovarian cancer Neg Hx      Colon cancer Neg Hx      Social History     Tobacco Use    Smoking status: Never Smoker    Smokeless tobacco: Never Used   Substance Use Topics    Alcohol use: Yes     Comment: social    Drug use: No       Current Outpatient Medications   Medication Sig    amoxicillin-clavulanate 875-125mg (AUGMENTIN) 875-125 mg per tablet     cholestyramine (QUESTRAN) 4 gram packet MIX THE CONTENTS OF ONE PACKET UTD AND TK BID.    cyclobenzaprine (FLEXERIL) 10 MG tablet     dexAMETHasone (DECADRON) 1 MG Tab     diclofenac sodium 1.5 % Drop APPLY A THIN LAYER (UP TO 3-4ML) TO THE AFFECTED AREAS 3-4 TIMES DAILY STARTING 2 WEEKS POST SURGERY.    etodolac (LODINE) 400 MG tablet     fluconazole (DIFLUCAN) 200 MG Tab TAKE 1 TABLET (200 MG TOTAL) BY MOUTH ONCE. TAKE 1 TAB EVERY OTHER DAY X 3 DOSES.    fluocinonide 0.1 % Crea APPLY A THIN LAYER (UP TO 2 GRAMS) TO THE AFFECTED AREA 3-4 TIMES DAILY FOR NO MORE THAN 15 CONSECUTIVE DAYS STARTING 1 MONTH POST SURGERY.    gabapentin (NEURONTIN) 600 MG tablet Take 600 mg by mouth 3 (three) times daily.    HYDROcodone-acetaminophen (NORCO) 5-325 mg per tablet Take 1 tablet by mouth every 4 (four) hours as needed for Pain.    ibuprofen (ADVIL,MOTRIN) 600 MG tablet Take 1 tablet (600 mg total) by mouth every 6 (six) hours as needed for Pain (cramping).    ketoconazole (NIZORAL) 2 % cream 1 application 2 (two) times daily. Apply to affected area    lidocaine-prilocaine (EMLA) cream APPLY 1-2 GRAMS TO THE AFFECTED AREAS 3-4 TIMES DAILY STARTING 2 WEEKS POST SURGERY.    LIDOTRAL 3.88 % Crea APPLY TO THE AFFECTED AREA 2 TIMES TO 3 TIMES DAILY    meloxicam (MOBIC) 15 MG tablet     methocarbamol (ROBAXIN) 750 MG Tab     NAFTIN 2 % Gel     ondansetron (ZOFRAN-ODT) 8 MG TbDL DISSOLVE 1 TABLET BY MOUTH EVERY 4-6 HRS AS NEEDED FOR NAUSEA    oxyCODONE-acetaminophen (PERCOCET)  mg per tablet TAKE 1 TABLET BY MOUTH EVERY 4-6 HRS AS NEEDED FOR POST-OP PAIN    SANARE ADV SCAR  THERAPY BASE Gel APPLY 1/2 GRAM (1 PUMP) TO AFFECTED AREAS TWICE DAILY STARTING 1 MONTH POST SURGERY    triamcinolone acetonide 0.1% (KENALOG) 0.1 % ointment     VIVA PATCH 2.5-4-2 % PtMd APPLY UP TO 3 PATCHES TO THE AFFECTED AREA TWICE DAILY    XIFAXAN 550 mg Tab TAKE 1 TABLET BY MOUTH THREE TIMES A DAY FOR 2 WEEKS     No current facility-administered medications for this visit.          Review of Systems:  Review of Systems   Constitutional: Negative for chills, diaphoresis, fatigue and fever.   Respiratory: Negative for cough and shortness of breath.    Cardiovascular: Negative for chest pain and palpitations.   Gastrointestinal: Negative for abdominal pain, constipation, diarrhea, nausea and vomiting.   Genitourinary: Positive for dysmenorrhea, menorrhagia, menstrual problem and vaginal bleeding. Negative for dyspareunia, pelvic pain, vaginal discharge and vaginal pain.   Musculoskeletal: Negative for back pain and myalgias.   Integumentary:  Negative for rash and acne.   Neurological: Negative for headaches.   Psychiatric/Behavioral: Negative for depression. The patient is not nervous/anxious.         OBJECTIVE:     Physical Exam:  Physical Exam   Constitutional: She is oriented to person, place, and time. She appears well-developed and well-nourished. No distress.   HENT:   Head: Normocephalic and atraumatic.   Eyes: EOM are normal.   Neck: Normal range of motion. Neck supple. No thyromegaly present.   Cardiovascular: Normal rate, regular rhythm and normal heart sounds.   Pulmonary/Chest: Effort normal and breath sounds normal. No respiratory distress.   Abdominal: Soft. Bowel sounds are normal. She exhibits no distension and no mass. There is no tenderness. There is no rebound and no guarding. No hernia.   Genitourinary:   Genitourinary Comments: See prior notes   Musculoskeletal: Normal range of motion. She exhibits no edema.   Neurological: She is alert and oriented to person, place, and time.   Skin:  Skin is warm. No rash noted.   Psychiatric: She has a normal mood and affect. Her behavior is normal. Judgment and thought content normal.   Nursing note and vitals reviewed.        ASSESSMENT:       ICD-10-CM ICD-9-CM    1. Abnormal uterine bleeding (AUB) N93.9 626.9 Notify Physician/Vital Signs Parameters Urine output less than 0.5mL/kg/hr (with indwelling catheter) or 30 mL/hr (without indwelling catheter) or blood glucose greater than 200 mg/dL      Notify physician      Notify Physician - Potential Need of Opioid Reversal      Full code      Place in Outpatient      [place] to [place]      Diet NPO      Place sequential compression device      POCT urine pregnancy          Plan:      Gloria was seen today for pre-op exam.    Diagnoses and all orders for this visit:    Abnormal uterine bleeding (AUB)  - Case scheduled for 7/9 at 830.   - Consents signed. Risks, benefits, alternatives discussed. Discussed ovarian conservation unless abnormal appearance.  - Preop appt done.   -     Notify Physician/Vital Signs Parameters Urine output less than 0.5mL/kg/hr (with indwelling catheter) or 30 mL/hr (without indwelling catheter) or blood glucose greater than 200 mg/dL; Standing  -     Notify physician; Standing  -     Notify Physician - Potential Need of Opioid Reversal; Standing  -     Full code; Standing  -     Place in Outpatient; Standing  -     [place] to [place]; Standing  -     Diet NPO; Standing  -     Place sequential compression device; Standing  -     POCT urine pregnancy; Standing    Other orders  -     IP VTE LOW RISK PATIENT; Standing  -     Progressive Mobility Protocol (mobilize patient to their highest level of functioning at least twice daily); Standing  -     ceFAZolin (ANCEF) 2 g in dextrose 5 % 50 mL IVPB    No orders of the defined types were placed in this encounter.      Follow up in about 6 weeks (around 8/9/2019) for post op.    Counseling time: 15 minutes    Geneva Orr

## 2019-07-09 ENCOUNTER — ANESTHESIA (OUTPATIENT)
Dept: SURGERY | Facility: OTHER | Age: 44
End: 2019-07-09
Payer: COMMERCIAL

## 2019-07-09 ENCOUNTER — HOSPITAL ENCOUNTER (OUTPATIENT)
Facility: OTHER | Age: 44
Discharge: HOME | End: 2019-07-11
Attending: OBSTETRICS & GYNECOLOGY | Admitting: OBSTETRICS & GYNECOLOGY
Payer: COMMERCIAL

## 2019-07-09 DIAGNOSIS — N93.9 ABNORMAL UTERINE BLEEDING (AUB): Primary | ICD-10-CM

## 2019-07-09 PROBLEM — N99.71 UTERINE PERFORATION BY UTERINE SOUND: Status: RESOLVED | Noted: 2019-03-13 | Resolved: 2019-07-09

## 2019-07-09 PROBLEM — Z97.5 NEXPLANON IN PLACE: Status: RESOLVED | Noted: 2018-12-05 | Resolved: 2019-07-09

## 2019-07-09 LAB
B-HCG UR QL: NEGATIVE
CTP QC/QA: YES
POCT GLUCOSE: 88 MG/DL (ref 70–110)

## 2019-07-09 PROCEDURE — 71000039 HC RECOVERY, EACH ADD'L HOUR: Performed by: OBSTETRICS & GYNECOLOGY

## 2019-07-09 PROCEDURE — 63600175 PHARM REV CODE 636 W HCPCS: Performed by: ANESTHESIOLOGY

## 2019-07-09 PROCEDURE — 63600175 PHARM REV CODE 636 W HCPCS: Performed by: NURSE ANESTHETIST, CERTIFIED REGISTERED

## 2019-07-09 PROCEDURE — 88307 TISSUE EXAM BY PATHOLOGIST: CPT | Mod: 26,,, | Performed by: PATHOLOGY

## 2019-07-09 PROCEDURE — 37000008 HC ANESTHESIA 1ST 15 MINUTES: Performed by: OBSTETRICS & GYNECOLOGY

## 2019-07-09 PROCEDURE — 63600175 PHARM REV CODE 636 W HCPCS: Performed by: OBSTETRICS & GYNECOLOGY

## 2019-07-09 PROCEDURE — 99900035 HC TECH TIME PER 15 MIN (STAT)

## 2019-07-09 PROCEDURE — 25000003 PHARM REV CODE 250: Performed by: ANESTHESIOLOGY

## 2019-07-09 PROCEDURE — 58571 TLH W/T/O 250 G OR LESS: CPT | Mod: 82,,, | Performed by: OBSTETRICS & GYNECOLOGY

## 2019-07-09 PROCEDURE — 81025 URINE PREGNANCY TEST: CPT | Performed by: OBSTETRICS & GYNECOLOGY

## 2019-07-09 PROCEDURE — 11982 REMOVE DRUG IMPLANT DEVICE: CPT | Mod: 51,,, | Performed by: OBSTETRICS & GYNECOLOGY

## 2019-07-09 PROCEDURE — 11982 PR REMOVAL DRUG IMPLANT DEVICE: ICD-10-PCS | Mod: 51,,, | Performed by: OBSTETRICS & GYNECOLOGY

## 2019-07-09 PROCEDURE — 37000009 HC ANESTHESIA EA ADD 15 MINS: Performed by: OBSTETRICS & GYNECOLOGY

## 2019-07-09 PROCEDURE — 58571 PR LAPAROSCOPY W TOT HYSTERECTUTERUS <=250 GRAM  W TUBE/OVARY: ICD-10-PCS | Mod: ,,, | Performed by: OBSTETRICS & GYNECOLOGY

## 2019-07-09 PROCEDURE — 58571 PR LAPAROSCOPY W TOT HYSTERECTUTERUS <=250 GRAM  W TUBE/OVARY: ICD-10-PCS | Mod: 82,,, | Performed by: OBSTETRICS & GYNECOLOGY

## 2019-07-09 PROCEDURE — 36000710: Performed by: OBSTETRICS & GYNECOLOGY

## 2019-07-09 PROCEDURE — 88307 TISSUE EXAM BY PATHOLOGIST: CPT | Performed by: PATHOLOGY

## 2019-07-09 PROCEDURE — 71000033 HC RECOVERY, INTIAL HOUR: Performed by: OBSTETRICS & GYNECOLOGY

## 2019-07-09 PROCEDURE — 27201423 OPTIME MED/SURG SUP & DEVICES STERILE SUPPLY: Performed by: OBSTETRICS & GYNECOLOGY

## 2019-07-09 PROCEDURE — 25000003 PHARM REV CODE 250: Performed by: NURSE ANESTHETIST, CERTIFIED REGISTERED

## 2019-07-09 PROCEDURE — 88307 TISSUE SPECIMEN TO PATHOLOGY - SURGERY: ICD-10-PCS | Mod: 26,,, | Performed by: PATHOLOGY

## 2019-07-09 PROCEDURE — 36000711: Performed by: OBSTETRICS & GYNECOLOGY

## 2019-07-09 PROCEDURE — 58571 TLH W/T/O 250 G OR LESS: CPT | Mod: ,,, | Performed by: OBSTETRICS & GYNECOLOGY

## 2019-07-09 RX ORDER — NEOSTIGMINE METHYLSULFATE 1 MG/ML
INJECTION, SOLUTION INTRAVENOUS
Status: DISCONTINUED | OUTPATIENT
Start: 2019-07-09 | End: 2019-07-09

## 2019-07-09 RX ORDER — HYDROMORPHONE HYDROCHLORIDE 2 MG/ML
0.4 INJECTION, SOLUTION INTRAMUSCULAR; INTRAVENOUS; SUBCUTANEOUS EVERY 5 MIN PRN
Status: DISCONTINUED | OUTPATIENT
Start: 2019-07-09 | End: 2019-07-09 | Stop reason: HOSPADM

## 2019-07-09 RX ORDER — SIMETHICONE 80 MG
80 TABLET,CHEWABLE ORAL EVERY 4 HOURS PRN
Status: DISCONTINUED | OUTPATIENT
Start: 2019-07-09 | End: 2019-07-11 | Stop reason: HOSPADM

## 2019-07-09 RX ORDER — ACETAMINOPHEN 500 MG
1000 TABLET ORAL
Status: COMPLETED | OUTPATIENT
Start: 2019-07-09 | End: 2019-07-09

## 2019-07-09 RX ORDER — OXYCODONE AND ACETAMINOPHEN 10; 325 MG/1; MG/1
1 TABLET ORAL EVERY 4 HOURS PRN
Status: DISCONTINUED | OUTPATIENT
Start: 2019-07-09 | End: 2019-07-11 | Stop reason: HOSPADM

## 2019-07-09 RX ORDER — SCOLOPAMINE TRANSDERMAL SYSTEM 1 MG/1
1 PATCH, EXTENDED RELEASE TRANSDERMAL ONCE
Status: COMPLETED | OUTPATIENT
Start: 2019-07-09 | End: 2019-07-09

## 2019-07-09 RX ORDER — MIDAZOLAM HYDROCHLORIDE 1 MG/ML
INJECTION INTRAMUSCULAR; INTRAVENOUS
Status: DISCONTINUED | OUTPATIENT
Start: 2019-07-09 | End: 2019-07-09

## 2019-07-09 RX ORDER — GLYCOPYRROLATE 0.2 MG/ML
INJECTION INTRAMUSCULAR; INTRAVENOUS
Status: DISCONTINUED | OUTPATIENT
Start: 2019-07-09 | End: 2019-07-09

## 2019-07-09 RX ORDER — DEXAMETHASONE SODIUM PHOSPHATE 4 MG/ML
INJECTION, SOLUTION INTRA-ARTICULAR; INTRALESIONAL; INTRAMUSCULAR; INTRAVENOUS; SOFT TISSUE
Status: DISCONTINUED | OUTPATIENT
Start: 2019-07-09 | End: 2019-07-09

## 2019-07-09 RX ORDER — DIPHENHYDRAMINE HCL 25 MG
25 CAPSULE ORAL EVERY 4 HOURS PRN
Status: DISCONTINUED | OUTPATIENT
Start: 2019-07-09 | End: 2019-07-11 | Stop reason: HOSPADM

## 2019-07-09 RX ORDER — SODIUM CHLORIDE, SODIUM LACTATE, POTASSIUM CHLORIDE, CALCIUM CHLORIDE 600; 310; 30; 20 MG/100ML; MG/100ML; MG/100ML; MG/100ML
INJECTION, SOLUTION INTRAVENOUS CONTINUOUS
Status: DISCONTINUED | OUTPATIENT
Start: 2019-07-09 | End: 2019-07-09

## 2019-07-09 RX ORDER — ROCURONIUM BROMIDE 10 MG/ML
INJECTION, SOLUTION INTRAVENOUS
Status: DISCONTINUED | OUTPATIENT
Start: 2019-07-09 | End: 2019-07-09

## 2019-07-09 RX ORDER — PREGABALIN 75 MG/1
75 CAPSULE ORAL
Status: COMPLETED | OUTPATIENT
Start: 2019-07-09 | End: 2019-07-09

## 2019-07-09 RX ORDER — KETOROLAC TROMETHAMINE 30 MG/ML
30 INJECTION, SOLUTION INTRAMUSCULAR; INTRAVENOUS EVERY 6 HOURS
Status: COMPLETED | OUTPATIENT
Start: 2019-07-09 | End: 2019-07-10

## 2019-07-09 RX ORDER — FAMOTIDINE 20 MG/1
20 TABLET, FILM COATED ORAL
Status: COMPLETED | OUTPATIENT
Start: 2019-07-09 | End: 2019-07-09

## 2019-07-09 RX ORDER — OXYCODONE HYDROCHLORIDE 5 MG/1
5 TABLET ORAL
Status: DISCONTINUED | OUTPATIENT
Start: 2019-07-09 | End: 2019-07-09 | Stop reason: HOSPADM

## 2019-07-09 RX ORDER — HYDROCODONE BITARTRATE AND ACETAMINOPHEN 5; 325 MG/1; MG/1
1 TABLET ORAL EVERY 4 HOURS PRN
Status: DISCONTINUED | OUTPATIENT
Start: 2019-07-09 | End: 2019-07-11 | Stop reason: HOSPADM

## 2019-07-09 RX ORDER — SODIUM CHLORIDE 0.9 % (FLUSH) 0.9 %
3 SYRINGE (ML) INJECTION
Status: DISCONTINUED | OUTPATIENT
Start: 2019-07-09 | End: 2019-07-11 | Stop reason: HOSPADM

## 2019-07-09 RX ORDER — CEFAZOLIN SODIUM 1 G/3ML
2 INJECTION, POWDER, FOR SOLUTION INTRAMUSCULAR; INTRAVENOUS
Status: COMPLETED | OUTPATIENT
Start: 2019-07-09 | End: 2019-07-09

## 2019-07-09 RX ORDER — IBUPROFEN 400 MG/1
800 TABLET ORAL EVERY 8 HOURS
Status: DISCONTINUED | OUTPATIENT
Start: 2019-07-10 | End: 2019-07-11 | Stop reason: HOSPADM

## 2019-07-09 RX ORDER — FENTANYL CITRATE 50 UG/ML
INJECTION, SOLUTION INTRAMUSCULAR; INTRAVENOUS
Status: DISCONTINUED | OUTPATIENT
Start: 2019-07-09 | End: 2019-07-09

## 2019-07-09 RX ORDER — PROPOFOL 10 MG/ML
VIAL (ML) INTRAVENOUS
Status: DISCONTINUED | OUTPATIENT
Start: 2019-07-09 | End: 2019-07-09

## 2019-07-09 RX ORDER — ONDANSETRON 8 MG/1
8 TABLET, ORALLY DISINTEGRATING ORAL EVERY 8 HOURS PRN
Status: DISCONTINUED | OUTPATIENT
Start: 2019-07-09 | End: 2019-07-11 | Stop reason: HOSPADM

## 2019-07-09 RX ORDER — ONDANSETRON 2 MG/ML
4 INJECTION INTRAMUSCULAR; INTRAVENOUS DAILY PRN
Status: DISCONTINUED | OUTPATIENT
Start: 2019-07-09 | End: 2019-07-09 | Stop reason: HOSPADM

## 2019-07-09 RX ORDER — ONDANSETRON 2 MG/ML
INJECTION INTRAMUSCULAR; INTRAVENOUS
Status: DISCONTINUED | OUTPATIENT
Start: 2019-07-09 | End: 2019-07-09

## 2019-07-09 RX ORDER — HYDROMORPHONE HYDROCHLORIDE 1 MG/ML
0.5 INJECTION, SOLUTION INTRAMUSCULAR; INTRAVENOUS; SUBCUTANEOUS EVERY 4 HOURS PRN
Status: DISCONTINUED | OUTPATIENT
Start: 2019-07-09 | End: 2019-07-10

## 2019-07-09 RX ORDER — MEPERIDINE HYDROCHLORIDE 25 MG/ML
12.5 INJECTION INTRAMUSCULAR; INTRAVENOUS; SUBCUTANEOUS ONCE AS NEEDED
Status: DISCONTINUED | OUTPATIENT
Start: 2019-07-09 | End: 2019-07-09 | Stop reason: HOSPADM

## 2019-07-09 RX ORDER — KETOROLAC TROMETHAMINE 30 MG/ML
INJECTION, SOLUTION INTRAMUSCULAR; INTRAVENOUS
Status: DISCONTINUED | OUTPATIENT
Start: 2019-07-09 | End: 2019-07-09

## 2019-07-09 RX ADMIN — FENTANYL CITRATE 100 MCG: 50 INJECTION, SOLUTION INTRAMUSCULAR; INTRAVENOUS at 09:07

## 2019-07-09 RX ADMIN — FENTANYL CITRATE 100 MCG: 50 INJECTION, SOLUTION INTRAMUSCULAR; INTRAVENOUS at 11:07

## 2019-07-09 RX ADMIN — CARBOXYMETHYLCELLULOSE SODIUM 2 DROP: 2.5 SOLUTION/ DROPS OPHTHALMIC at 09:07

## 2019-07-09 RX ADMIN — MIDAZOLAM HYDROCHLORIDE 2 MG: 1 INJECTION, SOLUTION INTRAMUSCULAR; INTRAVENOUS at 08:07

## 2019-07-09 RX ADMIN — OXYCODONE HYDROCHLORIDE 5 MG: 5 TABLET ORAL at 12:07

## 2019-07-09 RX ADMIN — SODIUM CHLORIDE, SODIUM LACTATE, POTASSIUM CHLORIDE, AND CALCIUM CHLORIDE: 600; 310; 30; 20 INJECTION, SOLUTION INTRAVENOUS at 10:07

## 2019-07-09 RX ADMIN — GLYCOPYRROLATE 0.6 MG: 0.2 INJECTION, SOLUTION INTRAMUSCULAR; INTRAVENOUS at 11:07

## 2019-07-09 RX ADMIN — NEOSTIGMINE METHYLSULFATE 5 MG: 1 INJECTION INTRAVENOUS at 11:07

## 2019-07-09 RX ADMIN — GLYCOPYRROLATE 0.2 MG: 0.2 INJECTION, SOLUTION INTRAMUSCULAR; INTRAVENOUS at 10:07

## 2019-07-09 RX ADMIN — FAMOTIDINE 20 MG: 20 TABLET, FILM COATED ORAL at 07:07

## 2019-07-09 RX ADMIN — HYDROMORPHONE HYDROCHLORIDE 0.4 MG: 2 INJECTION, SOLUTION INTRAMUSCULAR; INTRAVENOUS; SUBCUTANEOUS at 12:07

## 2019-07-09 RX ADMIN — DEXAMETHASONE SODIUM PHOSPHATE 8 MG: 4 INJECTION, SOLUTION INTRAMUSCULAR; INTRAVENOUS at 09:07

## 2019-07-09 RX ADMIN — SODIUM CHLORIDE, SODIUM LACTATE, POTASSIUM CHLORIDE, AND CALCIUM CHLORIDE: 600; 310; 30; 20 INJECTION, SOLUTION INTRAVENOUS at 08:07

## 2019-07-09 RX ADMIN — ROCURONIUM BROMIDE 10 MG: 10 INJECTION, SOLUTION INTRAVENOUS at 10:07

## 2019-07-09 RX ADMIN — KETOROLAC TROMETHAMINE 30 MG: 30 INJECTION, SOLUTION INTRAMUSCULAR at 07:07

## 2019-07-09 RX ADMIN — KETOROLAC TROMETHAMINE 30 MG: 30 INJECTION, SOLUTION INTRAMUSCULAR; INTRAVENOUS at 11:07

## 2019-07-09 RX ADMIN — FENTANYL CITRATE 50 MCG: 50 INJECTION, SOLUTION INTRAMUSCULAR; INTRAVENOUS at 10:07

## 2019-07-09 RX ADMIN — ONDANSETRON 4 MG: 2 INJECTION INTRAMUSCULAR; INTRAVENOUS at 11:07

## 2019-07-09 RX ADMIN — PREGABALIN 75 MG: 75 CAPSULE ORAL at 07:07

## 2019-07-09 RX ADMIN — CEFAZOLIN 2 G: 330 INJECTION, POWDER, FOR SOLUTION INTRAMUSCULAR; INTRAVENOUS at 09:07

## 2019-07-09 RX ADMIN — PROPOFOL 150 MG: 10 INJECTION, EMULSION INTRAVENOUS at 09:07

## 2019-07-09 RX ADMIN — HYDROMORPHONE HYDROCHLORIDE 0.4 MG: 2 INJECTION, SOLUTION INTRAMUSCULAR; INTRAVENOUS; SUBCUTANEOUS at 01:07

## 2019-07-09 RX ADMIN — SCOPALAMINE 1 PATCH: 1 PATCH, EXTENDED RELEASE TRANSDERMAL at 07:07

## 2019-07-09 RX ADMIN — ROCURONIUM BROMIDE 40 MG: 10 INJECTION, SOLUTION INTRAVENOUS at 09:07

## 2019-07-09 RX ADMIN — ACETAMINOPHEN 1000 MG: 500 TABLET, FILM COATED ORAL at 07:07

## 2019-07-09 NOTE — PLAN OF CARE
"RN noted in holding before procedure that pt had soft cast and splint on left lower leg and foot. Pt stated " it is still very sore from my procedure because the incision was coming open". RN alerted MD Orr to check pt's left leg before case to see if procedure could proceed. MD stated to place pt's leg up in stirups for procedure was fine. PT stated understanding and wanted to proceed with procedure.   "

## 2019-07-09 NOTE — TRANSFER OF CARE
"Anesthesia Transfer of Care Note    Patient: Gloria Chew    Procedure(s) Performed: Procedure(s) (LRB):  HYSTERECTOMY, TOTAL, LAPAROSCOPIC (N/A)  CYSTOSCOPY  SALPINGECTOMY, LAPAROSCOPIC (Bilateral)    Patient location: PACU    Anesthesia Type: general    Transport from OR: Transported from OR on 2-3 L/min O2 by NC with adequate spontaneous ventilation    Post pain: adequate analgesia    Post assessment: no apparent anesthetic complications    Post vital signs: stable    Level of consciousness: responds to stimulation    Nausea/Vomiting: no nausea/vomiting    Complications: none    Transfer of care protocol was followed      Last vitals:   Visit Vitals  BP (!) 143/79   Pulse 68   Temp 36.6 °C (97.9 °F) (Oral)   Resp 18   Ht 5' 8" (1.727 m)   Wt 97.5 kg (215 lb)   SpO2 100%   Breastfeeding? No   BMI 32.69 kg/m²     "

## 2019-07-09 NOTE — PLAN OF CARE
Problem: Pain Acute  Goal: Optimal Pain Control    Intervention: Develop Pain Management Plan     07/09/19 1553   Prevent or Manage Pain   Pain Management Interventions pain management plan reviewed with patient/caregiver

## 2019-07-09 NOTE — ANESTHESIA POSTPROCEDURE EVALUATION
Anesthesia Post Evaluation    Patient: Gloria Chew    Procedure(s) Performed: Procedure(s) (LRB):  HYSTERECTOMY, TOTAL, LAPAROSCOPIC (N/A)  CYSTOSCOPY  SALPINGECTOMY, LAPAROSCOPIC (Bilateral)    Final Anesthesia Type: general  Patient location during evaluation: PACU  Patient participation: Yes- Able to Participate  Level of consciousness: awake and alert  Post-procedure vital signs: reviewed and stable  Pain management: adequate  Airway patency: patent  PONV status at discharge: No PONV  Anesthetic complications: no      Cardiovascular status: blood pressure returned to baseline  Respiratory status: unassisted  Hydration status: euvolemic  Follow-up not needed.          Vitals Value Taken Time   /65 7/9/2019 12:48 PM   Temp 36.6 °C (97.9 °F) 7/9/2019 12:01 PM   Pulse 67 7/9/2019 12:55 PM   Resp 18 7/9/2019 12:30 PM   SpO2 100 % 7/9/2019 12:55 PM   Vitals shown include unvalidated device data.      No case tracking events are documented in the log.      Pain/Demetrio Score: Pain Rating Prior to Med Admin: 5 (7/9/2019 12:49 PM)  Demetrio Score: 9 (7/9/2019 12:30 PM)

## 2019-07-09 NOTE — BRIEF OP NOTE
[facility]  Brief Operative Note     SUMMARY     Surgery Date: 7/9/2019     Surgeon(s) and Role:     * Geneva Orr MD - Primary     * Lucas Webber Jr., MD - Assisting        Pre-op Diagnosis:  Abnormal uterine bleeding (AUB) [N93.9]    Post-op Diagnosis:  Post-Op Diagnosis Codes:     * Abnormal uterine bleeding (AUB) [N93.9]    Procedure(s) (LRB):  HYSTERECTOMY, TOTAL, LAPAROSCOPIC (N/A)  CYSTOSCOPY  SALPINGECTOMY, LAPAROSCOPIC (Bilateral)    Anesthesia: General    Description of the findings of the procedure: Fibroid uterus, normal appearing fallopian tubes and ovaries bilaterally. No adhesive disease. Nexplanon removed from right arm without difficulty.    Findings/Key Components: Uterus sounded to 8 cm. Fibroid uterus, normal appearing fallopian tubes and ovaries bilaterally. Normal cystoscopy with bilateral ureteral jets at the close of the case.    Estimated Blood Loss: 100 mL         Specimens:   Specimen (12h ago, onward)    Start     Ordered    07/09/19 1054  Specimen to Pathology - Surgery  Once     Comments:  1. Uterus, cervix and bilateral tubes     Start Status     07/09/19 1054 Collected (07/09/19 1054) Order ID: [id number]       07/09/19 1053          Geneva Orr MD  OB/GYN

## 2019-07-09 NOTE — INTERVAL H&P NOTE
The patient has been examined and the H&P has been reviewed:    I concur with the findings and no changes have occurred since H&P was written.    Anesthesia/Surgery risks, benefits and alternative options discussed and understood by patient/family.          Active Hospital Problems    Diagnosis  POA    Abnormal uterine bleeding (AUB) [N93.9]  Yes      Resolved Hospital Problems   No resolved problems to display.     To OR for scheduled procedure. Ovarian conservation unless appearance grossly abnormal.     Geneva Orr MD  OB/GYN

## 2019-07-10 LAB
ANION GAP SERPL CALC-SCNC: 10 MMOL/L (ref 8–16)
BASOPHILS # BLD AUTO: 0 K/UL (ref 0–0.2)
BASOPHILS NFR BLD: 0 % (ref 0–1.9)
BUN SERPL-MCNC: 14 MG/DL (ref 6–20)
CALCIUM SERPL-MCNC: 9.1 MG/DL (ref 8.7–10.5)
CHLORIDE SERPL-SCNC: 106 MMOL/L (ref 95–110)
CO2 SERPL-SCNC: 23 MMOL/L (ref 23–29)
CREAT SERPL-MCNC: 1 MG/DL (ref 0.5–1.4)
DIFFERENTIAL METHOD: ABNORMAL
EOSINOPHIL # BLD AUTO: 0 K/UL (ref 0–0.5)
EOSINOPHIL NFR BLD: 0 % (ref 0–8)
ERYTHROCYTE [DISTWIDTH] IN BLOOD BY AUTOMATED COUNT: 14 % (ref 11.5–14.5)
EST. GFR  (AFRICAN AMERICAN): >60 ML/MIN/1.73 M^2
EST. GFR  (NON AFRICAN AMERICAN): >60 ML/MIN/1.73 M^2
GLUCOSE SERPL-MCNC: 94 MG/DL (ref 70–110)
HCT VFR BLD AUTO: 34.5 % (ref 37–48.5)
HGB BLD-MCNC: 11.3 G/DL (ref 12–16)
LYMPHOCYTES # BLD AUTO: 1.3 K/UL (ref 1–4.8)
LYMPHOCYTES NFR BLD: 14.4 % (ref 18–48)
MCH RBC QN AUTO: 29.5 PG (ref 27–31)
MCHC RBC AUTO-ENTMCNC: 32.8 G/DL (ref 32–36)
MCV RBC AUTO: 90 FL (ref 82–98)
MONOCYTES # BLD AUTO: 0.5 K/UL (ref 0.3–1)
MONOCYTES NFR BLD: 6 % (ref 4–15)
NEUTROPHILS # BLD AUTO: 7.1 K/UL (ref 1.8–7.7)
NEUTROPHILS NFR BLD: 79.6 % (ref 38–73)
PLATELET # BLD AUTO: 249 K/UL (ref 150–350)
PMV BLD AUTO: 10 FL (ref 9.2–12.9)
POTASSIUM SERPL-SCNC: 4 MMOL/L (ref 3.5–5.1)
RBC # BLD AUTO: 3.83 M/UL (ref 4–5.4)
SODIUM SERPL-SCNC: 139 MMOL/L (ref 136–145)
WBC # BLD AUTO: 8.88 K/UL (ref 3.9–12.7)

## 2019-07-10 PROCEDURE — 85025 COMPLETE CBC W/AUTO DIFF WBC: CPT

## 2019-07-10 PROCEDURE — 25000003 PHARM REV CODE 250: Performed by: OBSTETRICS & GYNECOLOGY

## 2019-07-10 PROCEDURE — 36415 COLL VENOUS BLD VENIPUNCTURE: CPT

## 2019-07-10 PROCEDURE — G0378 HOSPITAL OBSERVATION PER HR: HCPCS

## 2019-07-10 PROCEDURE — 63600175 PHARM REV CODE 636 W HCPCS: Performed by: OBSTETRICS & GYNECOLOGY

## 2019-07-10 PROCEDURE — 97530 THERAPEUTIC ACTIVITIES: CPT

## 2019-07-10 PROCEDURE — 94761 N-INVAS EAR/PLS OXIMETRY MLT: CPT

## 2019-07-10 PROCEDURE — 99900035 HC TECH TIME PER 15 MIN (STAT)

## 2019-07-10 PROCEDURE — 80048 BASIC METABOLIC PNL TOTAL CA: CPT

## 2019-07-10 PROCEDURE — 97162 PT EVAL MOD COMPLEX 30 MIN: CPT

## 2019-07-10 PROCEDURE — 94799 UNLISTED PULMONARY SVC/PX: CPT

## 2019-07-10 RX ORDER — HYDROCODONE BITARTRATE AND ACETAMINOPHEN 5; 325 MG/1; MG/1
1 TABLET ORAL EVERY 6 HOURS PRN
Qty: 20 TABLET | Refills: 0 | Status: SHIPPED | OUTPATIENT
Start: 2019-07-10 | End: 2021-02-19

## 2019-07-10 RX ORDER — IBUPROFEN 600 MG/1
600 TABLET ORAL 3 TIMES DAILY
Qty: 30 TABLET | Refills: 1 | Status: SHIPPED | OUTPATIENT
Start: 2019-07-10 | End: 2021-02-19

## 2019-07-10 RX ADMIN — KETOROLAC TROMETHAMINE 30 MG: 30 INJECTION, SOLUTION INTRAMUSCULAR at 05:07

## 2019-07-10 RX ADMIN — OXYCODONE HYDROCHLORIDE AND ACETAMINOPHEN 1 TABLET: 10; 325 TABLET ORAL at 01:07

## 2019-07-10 RX ADMIN — HYDROMORPHONE HYDROCHLORIDE 0.5 MG: 1 INJECTION, SOLUTION INTRAMUSCULAR; INTRAVENOUS; SUBCUTANEOUS at 10:07

## 2019-07-10 RX ADMIN — HYDROCODONE BITARTRATE AND ACETAMINOPHEN 1 TABLET: 5; 325 TABLET ORAL at 08:07

## 2019-07-10 RX ADMIN — IBUPROFEN 800 MG: 400 TABLET, FILM COATED ORAL at 05:07

## 2019-07-10 RX ADMIN — KETOROLAC TROMETHAMINE 30 MG: 30 INJECTION, SOLUTION INTRAMUSCULAR at 11:07

## 2019-07-10 RX ADMIN — KETOROLAC TROMETHAMINE 30 MG: 30 INJECTION, SOLUTION INTRAMUSCULAR at 12:07

## 2019-07-10 RX ADMIN — SODIUM CHLORIDE, SODIUM LACTATE, POTASSIUM CHLORIDE, AND CALCIUM CHLORIDE 500 ML: .6; .31; .03; .02 INJECTION, SOLUTION INTRAVENOUS at 07:07

## 2019-07-10 RX ADMIN — OXYCODONE HYDROCHLORIDE AND ACETAMINOPHEN 1 TABLET: 10; 325 TABLET ORAL at 07:07

## 2019-07-10 NOTE — PT/OT/SLP EVAL
Physical Therapy Evaluation and Treatment    Patient Name:  Gloria Chew   MRN:  [id number]    Recommendations:     Discharge Recommendations:  home with home health, home health PT, home health OT(Pending progress with OOB mobilit with improved pain control)   Discharge Equipment Recommendations: (Would benefit from shower chair vs TTB 2/2 LE impairements)   Barriers to discharge: Inaccessible home, Decreased caregiver support and decreased tolerance for OOB mobility/gait due to increased pain    Assessment:     Gloria Chew is a 43 y.o. female admitted with a medical diagnosis of Abnormal uterine bleeding (AUB).  She presents with the following impairments/functional limitations:  weakness, impaired self care skills, impaired functional mobilty, gait instability, impaired balance, decreased upper extremity function, decreased lower extremity function, pain, decreased ROM, orthopedic precautions.    Patient evaluated by PT and goals established. Patient with increased pain in L abdomen/flank limiting her tolerance for OOB mobility. Assisted with transfer to [facility] for voiding trail, after sitting ~15 min with supervision pt with increased pain and unable to perform gait bout. PT will continue to follow and progress as tolerated. At this time pt may require HH PT/OT upon d/c to home with increased assistance from family, pending improvements in mobility with decreased pain.    Rehab Prognosis: Good; patient would benefit from acute skilled PT services to address these deficits and reach maximum level of function.    Recent Surgery: Procedure(s) (LRB):  HYSTERECTOMY, TOTAL, LAPAROSCOPIC (N/A)  CYSTOSCOPY  SALPINGECTOMY, LAPAROSCOPIC (Bilateral) 1 Day Post-Op    Plan:     During this hospitalization, patient to be seen 5 x/week to address the identified rehab impairments via gait training, therapeutic activities, therapeutic exercises and progress toward the following goals:    · Plan of Care Expires:    "    Subjective     Chief Complaint: Pain in L abdomen/flank  Patient/Family Comments/goals: To have less pain and to get the cast off her L foot; expressed concern "using up" her PT before getting cast off  Pain/Comfort:  · Pain Rating 1: 10/10  · Location - Side 1: Left  · Location - Orientation 1: anterior  · Location 1: abdomen  · Pain Addressed 1: Reposition, Distraction, Cessation of Activity, Nurse notified  · Pain Rating Post-Intervention 1: 10/10("50/10")  · Pain Rating 2: 6/10  · Location - Side 2: Left  · Location - Orientation 2: generalized  · Location 2: ankle  · Pain Addressed 2: Reposition, Distraction, Cessation of Activity  · Pain Rating Post-Intervention 2: 8/10    Patients cultural, spiritual, Taoist conflicts given the current situation: no    Living Environment:  · Pt lives with her 2 daugthers in a ground floor apartment with 1 threshold steps to enter and no handrail(s).   · Pt has a tub shower with a regular height toilet.   · Upon discharge, patient will have assistance from her 2 daughters (1 majority of time, 1 PRN.  Prior level of function:  · Ambulation: mod(I) for household amb with use of RW and community with use of knee walker  · ADL's: Mod(I), has been performing standing sponge baths at sink or sitting on lip of tub   · IADLs: Able to assist with some cooking, but otherwise dependent since LLE accident ~1 month PTA  · Falls: Denies besides fall at work resulting in ankle/foot injury      Objective:     Communicated with ANNALISE Hall prior to session.  Patient found HOB elevated with SCD, peripheral IV  upon PT entry to room. Patient agreeable to evaluation.    General Precautions: Standard, fall   Orthopedic Precautions:LLE non weight bearing   Braces: (soft cast to L ankle)     Patient donned yellow sock and gait belt for OOB mobility.    Exams:  · Cognition:   · Patient is A&Ox4.  · Pt follows approximately 100% of one and two step commands.    · Mood: Pleasant and " cooperative.  · Musculoskeletal:  · Posture:    · Forward head  · LE ROM/Strength:   · R ROM: WFL  · L ROM: WFL except hip flexion limited 2/2 pain and ankle ROM not tested 2/2 in cast  · R Strength:   · Hip flexion: 3/5  · Knee extension: 5/5  · Dorsiflexion: 5/5   · L Strength:   · Hip flexion: 2/5  · Knee extension: 4/5  · Dorsiflexion: Not tested, in cast  · Neuromuscular:  · Sensation: Intact to light touch bilateral LEs.   · Coordination/Tone/Reflexes: No impairments identified with functional mobility. No formal testing performed.   · Balance:   · Static sitting: Supervision with BUE support on bed  · Static standing: without AD with Alan, with AD with CGA  · Visual-vestibular: No impairments identified with functional mobility. No formal testing performed.  · Integument: Visible skin intact  · Cardiopulmonary:  · Edema: None noted      Functional Mobility:  · Bed Mobility:     · Supine to Sit: supervision  · Sit to Supine: minimum assistance at LE 2/2 pain  · Transfers:     · Sit to Stand:  moderate assistance with no AD, Alan with RW  · Pt maintains NWB of LLE  · Bed to Chair: minimum assistance with RW and moderate assistance with  no AD  using  Step Transfer  · Pt maintains NWB of LLE  · Gait:  x3 steps at bedside with RW and Alan, maintains NWB of LLE  · Pt unable to tolerate further gait at this time 2/2 increased abdominal pain, assisted with return to bed      AM-PAC 6 CLICK MOBILITY  Total Score:15     Patient left HOB elevated with all lines intact, call button in reach, RN Isabel notified and family present.    GOALS:   Multidisciplinary Problems     Physical Therapy Goals        Problem: Physical Therapy Goal    Goal Priority Disciplines Outcome Goal Variances Interventions   Physical Therapy Goal     PT, PT/OT      Description:  Goals to be met by: 19    Patient will increase functional independence with mobility by performin. Sit<>stand with supervision with RW.  2. Gait x 50 feet with  supervision with RW.  3. Ascend/descend 1 threshold step(s) with least restrictive assistive device and CGA.   4. Supine<>sit without use of hospital bed features with mod(I).                         History:     Past Medical History:   Diagnosis Date    Ankle pain     Low back pain        Past Surgical History:   Procedure Laterality Date    ANKLE SURGERY Left     screw    CHOLECYSTECTOMY      CYSTOSCOPY  7/9/2019    [facility] OR    FOOT SURGERY Right     HYSTERECTOMY, TOTAL, LAPAROSCOPIC N/A 7/9/2019    [facility] at [facility] OR    HYSTEROSCOPY, WITH DILATION AND CURETTAGE OF UTERUS N/A 3/13/2019    Performed by [facility] at [facility] OR    INSERTION OF CONTRACEPTIVE CAPSULE  2012 / 2015    NEXPLANON REMOVED    SALPINGECTOMY, LAPAROSCOPIC Bilateral 7/9/2019    Performed by Geneva [facility]r [facility] at [facility] OR       Time Tracking:     PT Received On: 07/10/19  PT Start Time: 1301     PT Stop Time: 1333  PT Total Time (min): 32 min     Billable Minutes: Evaluation 20 and Therapeutic Activity 12      Citlalli Meyers, PT  07/10/2019

## 2019-07-10 NOTE — OP NOTE
OPERATIVE REPORT    DATE: 07/09/2019    PREOPERATIVE DIAGNOSIS  1. AUB-L  2. Obesity    POSTOPERATIVE DIAGNOSIS  1. AUB-L  2. Obesity    PROCEDURE:  1. Total Laparoscopic Hysterectomy  2. Bilateral salpingectomy  3. Cystoscopy  4. Nexplanon removal (right arm)    SURGEON: Geneva Orr MD    ASSISTANT: Lucas Webber MD (No qualified resident available)    ANESTHESIA: General    COMPLICATIONS: None    EBL: 100 cc    IV FLUIDS: 1300 cc    URINE OUTPUT: 300 cc    FINDINGS:     PROCEDURE: Patient was taken to the operating room where general anesthesia was administered and found to be adequate. She was prepped and draped in the dorsal lithotomy position. Valdez catheter was placed into the bladder. A weighted sterile speculum was placed in the vagina. The anterior lip of the cervix was grasped with a single tooth tenaculum. The uterus was sounded to approximately 8 cm. Stay sutures of 0-Vicryl was placed at 12 o'clock and 6 o'clock on the cervix. A ONEIDA manipulator was placed inside the endometrial cavity. Gloves were changed. A Veress needle was inserted into the umbilicus under tenting of the anterior abdominal wall. Placement into the peritoneal cavity was finally confirmed via saline drop test after several attempts. The abdomen was insufflated to 25mm Hg using Carbon dioxide. A 10 mm vertical supraumbilical skin incision was made with the scalpel. A 10 mm Optiview trocar was advanced through this incision. Excellent hemostasis was noted. The patient was placed in deep Trendelenburg. A 5 mm left lateral skin incision was made with a scalpel and a 5 mm trocar was advanced through this incision under visualization of the camera. A 5 mm right lateral skin incision was made with the scalpel. A 5 mm trocar was advanced through this incision under visualization of the camera. Excellent hemostasis was noted. Attention was turned to the left round ligament. This was cauterized and transected and continued anteriorly to  create the bladder flap to the midline. The left fallopian tube was excised from the mesosalpinx. The left uteroovarian ligament was cauterized and transected. This was carried down to the level of the uterine vessels. The vessels were cauterized but not transected. Attention was turned to the right round ligament. It was cauterized and transected and continued anteriorly to finish creating the bladder flap. The right fallopian tube ws excised from the mesosalpinx. The right uteroovarian ligament was cauterized and transected and then carried down to the level of the uterine vessels. The vessels were cauterized but not transected. The posterior colpotomy was created. This was continued to the level of the uterine vessels bilaterally. The uterine vessels were cauterized with bipolar and the colpotomy was continued anteriorly. The uterus was removed vaginally. A moist laparotomy sponge inside of glove was placed in the vagina to maintain intraperitoneal pressure. The vaginal cuff was reapproximated with mattress sutures using 0-Vicryl suture using the Endostitch. The pelvis was copiously irrigated and suctioned. Excellent hemostasis was noted. The henriquez catheter was removed and the cystoscope was placed into the bladder, noting normal bladder architecture and bilateral ureteral jets. The cystoscope was removed and the henriquez cathter was reinserted. Attention was turned to the anterior abdominal wall. The abdomen was deflated and all trocars were removed. The skin was closed with 4-0 Monocryl in a subcuticular fashion. The sponge and glove were removed. Attention was turned to the right arm where the Nexplanon device was palpated. The area was prepped with chloraprep. A stabbing incision was made with an 11 blade scalpel. The Nexplanon was identified and grasped with curved hemostat. It was removed intact. A bandage was placed over the incision site. Sponge, lap, and needle counts were correct x 2. The patient was taken  to the recovery room in stable condition with the henriquez in place.      Geneva Orr MD  OB/GYN

## 2019-07-10 NOTE — NURSING
Pt only urinated 150cc entire shift despite getting to bsc twice.  Bladder scan showed no residual.  MD notified, stat BMP and 500cc bolus of LR ordered.  Will continue to monitor.

## 2019-07-10 NOTE — PROGRESS NOTES
[facility]  Obstetrics & Gynecology  Progress Note    Patient Name: Gloria Chew  MRN: [id number]  Admission Date: 7/9/2019  Primary Care Provider: Payton Flores MD  Principal Problem: Abnormal uterine bleeding (AUB)    Subjective:     Interval History: POD #1 s/p TLH/BS/Cysto/Nexplanon removal    Patient did well overnight. Tolerated PO. Pain was well controlled. This AM she deferred henriquez removal at 0600 for some increasing LLQ pain and because she needs a walker due to a LLE injury (in boot). UOP adequate overnight ~ 500 in the last 5 hours. Patient is meeting post-operative milestones and discharge is anticipated after patient is able to void spontaneously.     Scheduled Meds:   ketorolac  30 mg Intravenous Q6H    Followed by    ibuprofen  800 mg Oral Q8H     Continuous Infusions:  PRN Meds:diphenhydrAMINE, HYDROcodone-acetaminophen, HYDROmorphone, ondansetron, oxyCODONE-acetaminophen, promethazine (PHENERGAN) IVPB, simethicone, sodium chloride 0.9%    Review of patient's allergies indicates:  No Known Allergies    Objective:     Vital Signs (Most Recent):  Temp: 97.8 °F (36.6 °C) (07/10/19 0412)  Pulse: 81 (07/10/19 0412)  Resp: 16 (07/10/19 0412)  BP: 110/64 (07/10/19 0412)  SpO2: 97 % (07/10/19 0600) Vital Signs (24h Range):  Temp:  [97.6 °F (36.4 °C)-98.9 °F (37.2 °C)] 97.8 °F (36.6 °C)  Pulse:  [66-81] 81  Resp:  [16-18] 16  SpO2:  [91 %-100 %] 97 %  BP: (109-143)/(60-79) 110/64     Weight: 97.5 kg (214 lb 15.2 oz)  Body mass index is 32.68 kg/m².  Patient's last menstrual period was 01/09/2007 (approximate).    I&O (Last 24H):    Intake/Output Summary (Last 24 hours) at 7/10/2019 0624  Last data filed at 7/9/2019 2300  Gross per 24 hour   Intake 2000 ml   Output 840 ml   Net 1160 ml       Physical Exam:   Constitutional: She is oriented to person, place, and time. She appears well-developed and well-nourished.    HENT:   Head: Normocephalic and atraumatic.    Eyes: Pupils are  equal, round, and reactive to light. Conjunctivae and EOM are normal.    Neck: Normal range of motion. Neck supple.    Cardiovascular: Normal rate, regular rhythm and normal heart sounds.     Pulmonary/Chest: Effort normal and breath sounds normal. No respiratory distress.        Abdominal: Soft. Bowel sounds are normal. She exhibits distension and abdominal incision. There is tenderness. There is no rebound and no guarding.   LSC incisions (3) are c/d/i with bandages. Mild distention, normal bowel sounds. Mild TTP throughout with moderate discomfort at the LLQ     Genitourinary:   Genitourinary Comments: Deferred; patient with spotting overnight           Musculoskeletal: Normal range of motion.       Neurological: She is alert and oriented to person, place, and time.    Skin: Skin is warm and dry. No rash noted.    Psychiatric: She has a normal mood and affect. Her behavior is normal. Judgment and thought content normal.     Laboratory:  AM CBC pending    Diagnostic Results:  Pathology pending    Assessment/Plan:     Active Diagnoses:    Diagnosis Date Noted POA    PRINCIPAL PROBLEM:  Abnormal uterine bleeding (AUB) [N93.9] 03/13/2019 Yes      Problems Resolved During this Admission:     Postoperative care:  - Procedure complications: none  - EBL: 100 cc  - IVF @ 125 cc/h; d/c when tolerating PO  - Diet: regular, tolerating  - Pain control: ibuprofen 600 mg, percocet 5/10 mg, 0.5 mg dilaudid BTP  - In/Out: Valdez in place draining clear yellow urine; d/c Valdez in AM POD #1  - Bowel function: -flatus/-BM  - PRN: simethicone, zofran, phenergan, benadryl  - Heme: Preop H/h 11.8/36.6; AM CBC pending  - PPX: ambulation encouraged, IS encouraged, no anticoagulation, TEDs/SCDs in place    Disposition: Will plan to evaluate the patient as she meets her post-operative milestones. Will tentatively plan for discharge to home today, POD #1.       Rosa Fairbanks MD  Obstetrics & Gynecology  [facility]

## 2019-07-10 NOTE — NURSING
Pt reports constant pain despite prn medications, particularly on the left side of abdomen.  Up to bsc with assistance, only voided 150cc.  MD aware, will continue to encourage getting out of bed to bsc with rn supervision.  Tolerated diet well.  Safety maintained.

## 2019-07-10 NOTE — PLAN OF CARE
Problem: Physical Therapy Goal  Goal: Physical Therapy Goal  Goals to be met by: 19    Patient will increase functional independence with mobility by performin. Sit<>stand with supervision with RW.  2. Gait x 50 feet with supervision with RW.  3. Ascend/descend 1 threshold step(s) with least restrictive assistive device and CGA.   4. Supine<>sit without use of hospital bed features with mod(I).       Patient evaluated by PT and goals established. Patient with increased pain in L abdomen/flank limiting her tolerance for OOB mobility. Assisted with transfer to [facility] for voiding trail, after sitting ~15 min with supervision pt with increased pain and unable to perform gait bout. PT will continue to follow and progress as tolerated. At this time pt may require HH PT/OT upon d/c to home with increased assistance from family, pending improvements in mobility with decreased pain. Please see progress note for detailed plan of care and recommendations.

## 2019-07-11 VITALS
TEMPERATURE: 98 F | OXYGEN SATURATION: 98 % | HEIGHT: 68 IN | SYSTOLIC BLOOD PRESSURE: 128 MMHG | HEART RATE: 59 BPM | DIASTOLIC BLOOD PRESSURE: 59 MMHG | RESPIRATION RATE: 16 BRPM | BODY MASS INDEX: 32.58 KG/M2 | WEIGHT: 214.94 LBS

## 2019-07-11 PROCEDURE — G0378 HOSPITAL OBSERVATION PER HR: HCPCS

## 2019-07-11 PROCEDURE — 25000003 PHARM REV CODE 250: Performed by: OBSTETRICS & GYNECOLOGY

## 2019-07-11 PROCEDURE — 94761 N-INVAS EAR/PLS OXIMETRY MLT: CPT

## 2019-07-11 PROCEDURE — 97116 GAIT TRAINING THERAPY: CPT

## 2019-07-11 PROCEDURE — 97530 THERAPEUTIC ACTIVITIES: CPT

## 2019-07-11 PROCEDURE — 99900035 HC TECH TIME PER 15 MIN (STAT)

## 2019-07-11 RX ORDER — SODIUM CHLORIDE, SODIUM LACTATE, POTASSIUM CHLORIDE, CALCIUM CHLORIDE 600; 310; 30; 20 MG/100ML; MG/100ML; MG/100ML; MG/100ML
INJECTION, SOLUTION INTRAVENOUS CONTINUOUS
Status: DISCONTINUED | OUTPATIENT
Start: 2019-07-11 | End: 2019-07-11 | Stop reason: HOSPADM

## 2019-07-11 RX ADMIN — SODIUM CHLORIDE, SODIUM LACTATE, POTASSIUM CHLORIDE, AND CALCIUM CHLORIDE 1000 ML: .6; .31; .03; .02 INJECTION, SOLUTION INTRAVENOUS at 01:07

## 2019-07-11 RX ADMIN — SODIUM CHLORIDE, SODIUM LACTATE, POTASSIUM CHLORIDE, AND CALCIUM CHLORIDE: .6; .31; .03; .02 INJECTION, SOLUTION INTRAVENOUS at 03:07

## 2019-07-11 RX ADMIN — OXYCODONE HYDROCHLORIDE AND ACETAMINOPHEN 1 TABLET: 10; 325 TABLET ORAL at 05:07

## 2019-07-11 RX ADMIN — IBUPROFEN 800 MG: 400 TABLET, FILM COATED ORAL at 05:07

## 2019-07-11 RX ADMIN — IBUPROFEN 800 MG: 400 TABLET, FILM COATED ORAL at 01:07

## 2019-07-11 RX ADMIN — OXYCODONE HYDROCHLORIDE AND ACETAMINOPHEN 1 TABLET: 10; 325 TABLET ORAL at 12:07

## 2019-07-11 NOTE — PT/OT/SLP PROGRESS
Physical Therapy Treatment    Patient Name:  Gloria Chew   MRN:  [id number]    Recommendations:     Discharge Recommendations:  home   Discharge Equipment Recommendations: (TTB)   Barriers to discharge: None    Assessment:     Gloria Chew is a 43 y.o. female admitted with a medical diagnosis of Abnormal uterine bleeding (AUB).  She presents with the following impairments/functional limitations:  weakness, gait instability, impaired functional mobilty, pain, impaired cardiopulmonary response to activity, orthopedic precautions, decreased lower extremity function, decreased ROM, edema, impaired skin, impaired endurance, impaired balance .  Significant improvement since yesterday.  Safe for dc home.  Pt is unable to amb long distance 2* ortho precautions(NWB ON LLE).  Pt needs to make sure she is up more frequently since she can only amb short distance .  Pt with SOB after gait but reports she had SOB at home prior to surgery when she got up and walked around.     Rehab Prognosis: Good; patient would benefit from acute skilled PT services to address these deficits and reach maximum level of function.    Recent Surgery: Procedure(s) (LRB):  HYSTERECTOMY, TOTAL, LAPAROSCOPIC (N/A)  CYSTOSCOPY  SALPINGECTOMY, LAPAROSCOPIC (Bilateral) 2 Days Post-Op    Plan:     During this hospitalization, patient to be seen 5 x/week to address the identified rehab impairments via gait training, therapeutic activities, therapeutic exercises and progress toward the following goals:    · Plan of Care Expires:  08/10/19    Subjective     Chief Complaint: abd pain  Patient/Family Comments/goals: go home when pain under control  Pain/Comfort:  · Pain Rating 1: 7/10  · Location - Orientation 1: generalized  · Location 1: abdomen  · Pain Addressed 1: Reposition, Distraction, Cessation of Activity, Nurse notified  · Pain Rating Post-Intervention 1: 8/10      Objective:     Communicated with nurse prior to session.  Patient found HOB  elevated with SCD, peripheral IV upon PT entry to room. Sleeping up arouses to voice.  Daughters present    General Precautions: Standard, fall   Orthopedic Precautions:LLE non weight bearing   Braces: (lower leg cast on L)     Functional Mobility:  · Bed Mobility:     · Supine to Sit: modified independence  · Sit to Supine: supervision  · Transfers:     · Sit to Stand:  stand by assistance with standard walker  · Gait: pt amb 15' x 2 to toilet in bathroom with RW, NWB on LLE and SBA.  amb 5' to sink and then 15' back to bed.    · Balance: good balance in standing       AM-PAC 6 CLICK MOBILITY  Turning over in bed (including adjusting bedclothes, sheets and blankets)?: 4  Sitting down on and standing up from a chair with arms (e.g., wheelchair, bedside commode, etc.): 3  Moving from lying on back to sitting on the side of the bed?: 4  Moving to and from a bed to a chair (including a wheelchair)?: 3  Need to walk in hospital room?: 3  Climbing 3-5 steps with a railing?: 1  Basic Mobility Total Score: 18       Therapeutic Activities and Exercises:   gait as above.  Assist with set up and clothing to urinate.  Discussed home DME , use of pillow for coughing or laughing. Also home activities and reinforce use of IS .      Patient left HOB elevated with all lines intact, call button in reach, nurse notified and daughters present..    GOALS:   Multidisciplinary Problems     Physical Therapy Goals        Problem: Physical Therapy Goal    Goal Priority Disciplines Outcome Goal Variances Interventions   Physical Therapy Goal     PT, PT/OT Ongoing (interventions implemented as appropriate)     Description:  Goals to be met by: 19    Patient will increase functional independence with mobility by performin. Sit<>stand with supervision with RW.-met 19  2. Gait x 50 feet with supervision with RW.  3. Ascend/descend 1 threshold step(s) with least restrictive assistive device and CGA.   4. Supine<>sit without use  of hospital bed features with mod(I).                          Time Tracking:     PT Received On: 07/11/19  PT Start Time: 1215     PT Stop Time: 1250  PT Total Time (min): 35 min     Billable Minutes: Gait Training 15 and Therapeutic Activity 20    Treatment Type: Treatment  PT/PTA: PT     PTA Visit Number: 0     Breana Abdi, PT  07/11/2019

## 2019-07-11 NOTE — PLAN OF CARE
Problem: Adult Inpatient Plan of Care  Goal: Plan of Care Review  Outcome: Outcome(s) achieved Date Met: 07/11/19  Eager & in agreement w/ DC. VU of DC instructions--paperwork & prescriptions passed & explained. IV removed w/ cath tip intact, WNL. Shower bench delivered at bedside. To be DCd home w/ family-- will be escorted downstairs via  transport team once dressed, ready & ride arrives. Free from falls, injury, or skin breakdown this hospital admission.

## 2019-07-11 NOTE — PLAN OF CARE
Problem: Adult Inpatient Plan of Care  Goal: Plan of Care Review  Outcome: Ongoing (interventions implemented as appropriate)  Patient in no apparent distress. Sat's 98 % on room air. IS done per patient . Will continue to monitor.

## 2019-07-11 NOTE — PLAN OF CARE
07/11/19 1336   Discharge Assessment   Assessment Type Final Discharge Note   Equipment Currently Used at Home   (tub bench)   Discharge Plan A Home with family

## 2019-07-11 NOTE — PLAN OF CARE
Problem: Adult Inpatient Plan of Care  Goal: Plan of Care Review  Outcome: Ongoing (interventions implemented as appropriate)  VSS and Afebrile. AAOx4. Dressing clean dry and intact. Pt stated her pain is better controlled with heat packs rather than the pain medication. Will continue to provide non-pharmacological methods of pain relief. Pt is tolerating Regular Diet. Appetite good. Ambulating with standby assist. PO fluids encouraged throughout the night. Plan of care reviewed with patient. Purposeful Hourly rounding done. Call light at bedside. Bed at lowest position. Brakes on. Nonskid socks on. Will continue to monitor

## 2019-07-11 NOTE — DISCHARGE SUMMARY
[facility]  Obstetrics & Gynecology  Discharge Summary    Patient Name: Gloria Chew  MRN: [id number]  Admission Date: 7/9/2019  Hospital Length of Stay: 0 days  Discharge Date and Time:  07/11/2019 7:06 AM  Attending Physician: Geneva Orr MD   Discharging Provider: Trinity Flores MD  Primary Care Provider: Payton Flores MD    HPI: Gloria Chew is a 43 y.o. female who presented for scheduled TLH/BS/cysto    Hospital Course: Patient presented for scheduled procedure. Patient was passed back to OR for scheduled procedure. Please see OP note for further details. Tolerated procedure well and patient was taken to recovery in a stable condition. Prior to discharge patient was able to void, ambulate, tolerate PO and pain was well controlled with PO meds. Patient was given routine post-op instructions for which patient voiced understanding. Patient was subsequently discharged home.      Procedure(s) (LRB):  HYSTERECTOMY, TOTAL, LAPAROSCOPIC (N/A)  CYSTOSCOPY  SALPINGECTOMY, LAPAROSCOPIC (Bilateral)     Consults:     Significant Diagnostic Studies: Labs:   CBC   Recent Labs   Lab 07/10/19  0605   WBC 8.88   HGB 11.3*   HCT 34.5*          Pending Diagnostic Studies:     None        Final Active Diagnoses:    Diagnosis Date Noted POA    PRINCIPAL PROBLEM:  Abnormal uterine bleeding (AUB) [N93.9] 03/13/2019 Yes      Problems Resolved During this Admission:        Discharged Condition: good    Disposition:     Follow Up:  Follow-up Information     Geneva Orr MD In 6 weeks.    Specialty:  Obstetrics and Gynecology  Why:  Post-op checkup  Contact information:  [address] [place] [address] [phone]                 Patient Instructions:      Diet Adult Regular     Notify your health care provider if you experience any of the following:  increased confusion or weakness     Notify your health care provider if you experience any of the following:  persistent  dizziness, light-headedness, or visual disturbances     Notify your health care provider if you experience any of the following:  worsening rash     Notify your health care provider if you experience any of the following:  severe persistent headache     Notify your health care provider if you experience any of the following:  difficulty breathing or increased cough     Notify your health care provider if you experience any of the following:  redness, tenderness, or signs of infection (pain, swelling, redness, odor or green/yellow discharge around incision site)     Notify your health care provider if you experience any of the following:  severe uncontrolled pain     Notify your health care provider if you experience any of the following:  persistent nausea and vomiting or diarrhea     Notify your health care provider if you experience any of the following:  temperature >100.4     No dressing needed     Activity as tolerated     Medications:  Reconciled Home Medications:      Medication List      CHANGE how you take these medications    * HYDROcodone-acetaminophen 5-325 mg per tablet  Commonly known as:  NORCO  Take 1 tablet by mouth every 4 (four) hours as needed for Pain.  What changed:  Another medication with the same name was added. Make sure you understand how and when to take each.     * HYDROcodone-acetaminophen 5-325 mg per tablet  Commonly known as:  NORCO  Take 1 tablet by mouth every 6 (six) hours as needed.  What changed:  You were already taking a medication with the same name, and this prescription was added. Make sure you understand how and when to take each.     * ibuprofen 600 MG tablet  Commonly known as:  ADVIL,MOTRIN  Take 1 tablet (600 mg total) by mouth every 6 (six) hours as needed for Pain (cramping).  What changed:  Another medication with the same name was added. Make sure you understand how and when to take each.     * ibuprofen 600 MG tablet  Commonly known as:  ADVIL,MOTRIN  Take 1  tablet (600 mg total) by mouth 3 (three) times daily.  What changed:  You were already taking a medication with the same name, and this prescription was added. Make sure you understand how and when to take each.         * This list has 4 medication(s) that are the same as other medications prescribed for you. Read the directions carefully, and ask your doctor or other care provider to review them with you.            CONTINUE taking these medications    amoxicillin-clavulanate 875-125mg 875-125 mg per tablet  Commonly known as:  AUGMENTIN     cholestyramine 4 gram packet  Commonly known as:  QUESTRAN  MIX THE CONTENTS OF ONE PACKET UTD AND TK BID.     cyclobenzaprine 10 MG tablet  Commonly known as:  FLEXERIL     dexAMETHasone 1 MG Tab  Commonly known as:  DECADRON     diclofenac sodium 1.5 % Drop  APPLY A THIN LAYER (UP TO 3-4ML) TO THE AFFECTED AREAS 3-4 TIMES DAILY STARTING 2 WEEKS POST SURGERY.     etodolac 400 MG tablet  Commonly known as:  LODINE     fluconazole 200 MG Tab  Commonly known as:  DIFLUCAN  TAKE 1 TABLET (200 MG TOTAL) BY MOUTH ONCE. TAKE 1 TAB EVERY OTHER DAY X 3 DOSES.     fluocinonide 0.1 % Crea  APPLY A THIN LAYER (UP TO 2 GRAMS) TO THE AFFECTED AREA 3-4 TIMES DAILY FOR NO MORE THAN 15 CONSECUTIVE DAYS STARTING 1 MONTH POST SURGERY.     gabapentin 600 MG tablet  Commonly known as:  NEURONTIN  Take 600 mg by mouth 3 (three) times daily.     ketoconazole 2 % cream  Commonly known as:  NIZORAL  1 application 2 (two) times daily. Apply to affected area     lidocaine-prilocaine cream  Commonly known as:  EMLA  APPLY 1-2 GRAMS TO THE AFFECTED AREAS 3-4 TIMES DAILY STARTING 2 WEEKS POST SURGERY.     LIDOTRAL 3.88 % Crea  Generic drug:  lidocaine HCl  APPLY TO THE AFFECTED AREA 2 TIMES TO 3 TIMES DAILY     meloxicam 15 MG tablet  Commonly known as:  MOBIC     methocarbamol 750 MG Tab  Commonly known as:  ROBAXIN     NAFTIN 2 % Gel  Generic drug:  naftifine     ondansetron 8 MG Tbdl  Commonly known as:   ZOFRAN-ODT  DISSOLVE 1 TABLET BY MOUTH EVERY 4-6 HRS AS NEEDED FOR NAUSEA     oxyCODONE-acetaminophen  mg per tablet  Commonly known as:  PERCOCET  TAKE 1 TABLET BY MOUTH EVERY 4-6 HRS AS NEEDED FOR POST-OP PAIN     SANARE ADV SCAR THERAPY BASE Gel  Generic drug:  gel base no.184 (bulk)  APPLY 1/2 GRAM (1 PUMP) TO AFFECTED AREAS TWICE DAILY STARTING 1 MONTH POST SURGERY     triamcinolone acetonide 0.1% 0.1 % ointment  Commonly known as:  KENALOG     VIVA PATCH 2.5-4-2 % Ptmd  Generic drug:  lidocaine-methyl sal-camphor  APPLY UP TO 3 PATCHES TO THE AFFECTED AREA TWICE DAILY     XIFAXAN 550 mg Tab  Generic drug:  rifAXIMin  TAKE 1 TABLET BY MOUTH THREE TIMES A DAY FOR 2 WEEKS            Trinity Flores MD  Obstetrics & Gynecology  [facility]

## 2019-07-11 NOTE — PROGRESS NOTES
Progress Note  Gynecology    Admit Date: 2019  LOS: 0    Reason for Admission:  Abnormal uterine bleeding (AUB)    SUBJECTIVE:     Gloria Chew is a 43 y.o.  who is POD#2 s/p TLH/BS/Cysto/Nexplanon removal  Pt doing well this morning. Pain is well controlled. She is tolerating a regular diet without N/V. Ambulating without difficulty. Valdez was removed yesterday, and pt with adequate urine OP overnight (~850cc). Passing flatus. She is not yet having bowel movements.    OBJECTIVE:     Vital Signs   Temp:  [97.9 °F (36.6 °C)-98.3 °F (36.8 °C)] 98 °F (36.7 °C)  Pulse:  [60-74] 63  Resp:  [15-18] 18  SpO2:  [95 %-98 %] 95 %  BP: (101-129)/(55-62) 122/61      Intake/Output Summary (Last 24 hours) at 2019 0713  Last data filed at 2019 0500  Gross per 24 hour   Intake 1481.67 ml   Output 1100 ml   Net 381.67 ml       Physical Exam:  Gen: A&Ox3, NAD  Pulm:  normal respiratory effort  Abd: normo-active bowel sounds, soft, non-distended, mildly-tender to palpation without rebound or guarding. 3 lsc incision are c/d/i with steri strops in place. Mild discomfort of LLQ on palpation.  Ext: no peripheral edema, TEDs/SCDs in place    Laboratory:  Recent Labs   Lab 07/10/19  0605   WBC 8.88   HGB 11.3*   HCT 34.5*   MCV 90             Diagnostic Results:      ASSESSMENT/PLAN:     Active Hospital Problems    Diagnosis  POA    *Abnormal uterine bleeding (AUB) [N93.9]  Yes      Resolved Hospital Problems   No resolved problems to display.       Assessment: 43 y.o.  POD2 s/p TLH/BS/cysto    Plan:   1. Post-op   - Routine post-op advances  - Continue PRN pain medications  - PT voiding spontaneously with adequate UOP  - Encourage ambulation   - Encourage IS  - CBC stable   - tolerating regular diet.  - Antiemetics prn nausea/vomiting.      Dispo: As patient meets appropriate post-op milestones, plan for discharge to home today    Trinity Flores M.D.   OB/GYN  PGY-1    Patient seen and examined. Agree  with above. Stable for discharge to home today. Again encouraged more ambulation. Patient agrees to try.     Rosa Fairbanks MD, PhD  OBGYN, PGY-4

## 2019-07-11 NOTE — PLAN OF CARE
Problem: Physical Therapy Goal  Goal: Physical Therapy Goal  Goals to be met by: 19    Patient will increase functional independence with mobility by performin. Sit<>stand with supervision with RW.-met 19  2. Gait x 50 feet with supervision with RW.  3. Ascend/descend 1 threshold step(s) with least restrictive assistive device and CGA.   4. Supine<>sit without use of hospital bed features with mod(I).        Outcome: Ongoing (interventions implemented as appropriate)  Pt amb to and from bathroom with RW and S-NWB on LLE/cast on lower leg.  Pt with SOB after amb but O2 sats were % on RA.  Pt stated she had SOB at home with amb.  Safe for dc home   REC:  Home with family  DME:  LORNA

## 2019-07-11 NOTE — PLAN OF CARE
Pharmacy of choice is [facility]  Billing address correct on face sheet  No social or mental issues    Per PT pt needed tub bench due to cast on left leg (previous injury).  All delivery forms signed and bench delivered to pt's room.       07/11/19 1233   Discharge Assessment   Assessment Type Discharge Planning Assessment   Confirmed/corrected address and phone number on facesheet? Yes  (Physical address is [phone])   Assessment information obtained from? Patient  (Oldest daughter works as pharmacy tech here)   Expected Length of Stay (days) 2   Communicated expected length of stay with patient/caregiver yes   Prior to hospitilization cognitive status: Alert/Oriented   Prior to hospitalization functional status: Assistive Equipment  (Pt has cast on left leg from fall at work; Has rolling walker)   Current cognitive status: Alert/Oriented   Current Functional Status: Assistive Equipment   Facility Arrived From: Admit from surgery   Lives With child(braxton), adult;child(braxton), dependent   Able to Return to Prior Arrangements yes   Is patient able to care for self after discharge? Yes   Patient's perception of discharge disposition home or selfcare   Readmission Within the Last 30 Days no previous admission in last 30 days   Patient currently being followed by outpatient case management? No   Patient currently receives any other outside agency services? No   Equipment Currently Used at Home 3-in-1 commode;walker, rolling  (electric scooter)   Do you have any problems affording any of your prescribed medications? No   Is the patient taking medications as prescribed? yes   Does the patient have transportation home? Yes  (daughters are present and will drive pt homw)   Transportation Anticipated family or friend will provide   Does the patient receive services at the [facility]? No   Discharge Plan A Home with family   Discharge Plan B Home with family   DME Needed Upon Discharge  shower  chair   Patient/Family in Agreement with Plan yes

## 2019-07-11 NOTE — PROGRESS NOTES
Progress Note  Gynecology    Admit Date: 2019  LOS: 0    Reason for Admission:  Abnormal uterine bleeding (AUB)    SUBJECTIVE:     Gloria Chew is a 43 y.o.  who is POD#2 s/p TLH/BS/Cysto/Nexplanon removal  Pt doing well this morning. Pain is well controlled. She is tolerating a regular diet without N/V. Ambulating without difficulty. Valdez was removed yesterday, and pt with adequate urine OP overnight (~850cc). Passing flatus. She is not yet having bowel movements.    OBJECTIVE:     Vital Signs   Temp:  [97.9 °F (36.6 °C)-98.3 °F (36.8 °C)] 98 °F (36.7 °C)  Pulse:  [60-74] 63  Resp:  [15-18] 18  SpO2:  [95 %-98 %] 95 %  BP: (101-129)/(55-62) 122/61      Intake/Output Summary (Last 24 hours) at 2019 0621  Last data filed at 2019 0500  Gross per 24 hour   Intake 1481.67 ml   Output 1100 ml   Net 381.67 ml       Physical Exam:  Gen: A&Ox3, NAD  Pulm:  normal respiratory effort  Abd: normo-active bowel sounds, soft, non-distended, mildly-tender to palpation without rebound or guarding. 3 lsc incision are c/d/i with steri strops in place. Mild discomfort of LLQ on palpation.  Ext: no peripheral edema, TEDs/SCDs in place    Laboratory:  Recent Labs   Lab 07/10/19  0605   WBC 8.88   HGB 11.3*   HCT 34.5*   MCV 90             Diagnostic Results:      ASSESSMENT/PLAN:     Active Hospital Problems    Diagnosis  POA    *Abnormal uterine bleeding (AUB) [N93.9]  Yes      Resolved Hospital Problems   No resolved problems to display.       Assessment: 43 y.o.  POD2 s/p TLH/BS/cysto    Plan:   1. Post-op   - Routine post-op advances  - Continue PRN pain medications  - PT voiding spontaneously with adequate UOP  - Encourage ambulation   - Encourage IS  - CBC stable   - tolerating regular diet.  - Antiemetics prn nausea/vomiting.      Dispo: As patient meets appropriate post-op milestones, plan for discharge to home today    Trinity Flores M.D.   OB/GYN  PGY-1

## 2019-07-11 NOTE — NURSING
OB resident paged at 12:22 in regards to urine output. Pt put out 100cc of urine after 500cc bolus with no residual urine during bladder scan. Pt denies any pressure or urge to void. Will continue to monitor.

## 2019-07-12 NOTE — OPERATIVE NOTE ADDENDUM
Certification of Assistant at Surgery       Surgery Date: 7/9/2019     Participating Surgeons:  Surgeon(s) and Role:     * Geneva Orr MD - Primary     * Lucas Webber Jr., MD - Assisting    Procedures:  Procedure(s) (LRB):  HYSTERECTOMY, TOTAL, LAPAROSCOPIC (N/A)  CYSTOSCOPY  SALPINGECTOMY, LAPAROSCOPIC (Bilateral)    Assistant Surgeon's Certification of Necessity:  I understand that section 1842 (b) (6) (d) of the Social Security Act generally prohibits Medicare Part B reasonable charge payment for the services of assistants at surgery in teaching hospitals when qualified residents are available to furnish such services. I certify that the services for which payment is claimed were medically necessary, and that no qualified resident was available to perform the services. I further understand that these services are subject to post-payment review by the Medicare carrier.      Lucas Webber Jr, MD    07/12/2019  9:19 AM    Lucas Webber MD

## 2019-07-18 ENCOUNTER — TELEPHONE (OUTPATIENT)
Dept: OBSTETRICS AND GYNECOLOGY | Facility: CLINIC | Age: 44
End: 2019-07-18

## 2019-07-24 ENCOUNTER — TELEPHONE (OUTPATIENT)
Dept: OBSTETRICS AND GYNECOLOGY | Facility: CLINIC | Age: 44
End: 2019-07-24

## 2019-07-24 NOTE — TELEPHONE ENCOUNTER
----- Message from Dara Stanley MA sent at 7/18/2019  5:30 PM CDT -----  Please call patient and let her know the pathology from her surgery was normal. Please let me know if she is having any issues.

## 2019-07-24 NOTE — TELEPHONE ENCOUNTER
Left vm for pt to give the office a call.    Please call patient and let her know the pathology from her surgery was normal. Please let me know if she is having any issues.

## 2019-08-21 ENCOUNTER — OFFICE VISIT (OUTPATIENT)
Dept: OBSTETRICS AND GYNECOLOGY | Facility: CLINIC | Age: 44
End: 2019-08-21
Payer: COMMERCIAL

## 2019-08-21 VITALS
BODY MASS INDEX: 31.68 KG/M2 | DIASTOLIC BLOOD PRESSURE: 84 MMHG | WEIGHT: 213.88 LBS | HEIGHT: 69 IN | SYSTOLIC BLOOD PRESSURE: 120 MMHG

## 2019-08-21 DIAGNOSIS — N93.9 ABNORMAL UTERINE BLEEDING (AUB): Primary | ICD-10-CM

## 2019-08-21 DIAGNOSIS — Z90.710 S/P HYSTERECTOMY: ICD-10-CM

## 2019-08-21 PROCEDURE — 99024 POSTOP FOLLOW-UP VISIT: CPT | Mod: S$GLB,,, | Performed by: OBSTETRICS & GYNECOLOGY

## 2019-08-21 PROCEDURE — 99024 PR POST-OP FOLLOW-UP VISIT: ICD-10-PCS | Mod: S$GLB,,, | Performed by: OBSTETRICS & GYNECOLOGY

## 2019-08-21 RX ORDER — SERTRALINE HYDROCHLORIDE 100 MG/1
TABLET, FILM COATED ORAL
COMMUNITY
Start: 2019-08-20 | End: 2022-06-03

## 2019-08-21 NOTE — PROGRESS NOTES
Subjective:       Patient ID: Gloria Chew is a 44 y.o. female.    Chief Complaint:  Post-op Evaluation      History of Present Illness  HPI  Postoperative Follow-up  Patient presents to the clinic 6 weeks status post TLH/BS for abnormal uterine bleeding. She had nexplanon removed as well. Eating a regular diet without difficulty. Bowel movements are normal. The patient is not having any pain.    FINAL PATHOLOGIC DIAGNOSIS  UTERUS, CERVIX AND BILATERAL FALLOPIAN TUBES WEIGHING 134 G SHOWING:  INACTIVE ENDOMETRIUM  INTRAMURAL LEIOMYOMAS  NEGATIVE CERVIX  UNREMARKABLE FALLOPIAN TUBES    GYN & OB History  No LMP recorded. (Menstrual status: Birth Control).   Date of Last Pap: 10/8/2018    OB History    Para Term  AB Living   2 2       2   SAB TAB Ectopic Multiple Live Births           2      # Outcome Date GA Lbr Dileep/2nd Weight Sex Delivery Anes PTL Lv   2 Para            1 Para                Review of Systems  Review of Systems   Constitutional: Negative for chills, diaphoresis, fatigue and fever.   Respiratory: Negative for cough and shortness of breath.    Cardiovascular: Negative for chest pain and palpitations.   Gastrointestinal: Negative for abdominal pain, constipation, diarrhea, nausea and vomiting.   Genitourinary: Negative for dyspareunia, dysuria, frequency, hematuria, hot flashes, pelvic pain, vaginal bleeding, vaginal discharge and vaginal pain.   Musculoskeletal: Negative for back pain and myalgias.   Integumentary:  Negative for rash, acne, breast mass, nipple discharge and breast skin changes.   Neurological: Negative for headaches.   Psychiatric/Behavioral: Negative for depression. The patient is not nervous/anxious.    Breast: Negative for mass, mastodynia, nipple discharge and skin changes          Objective:    Physical Exam:   Constitutional: She is oriented to person, place, and time. She appears well-developed and well-nourished. No distress.    HENT:   Head: Normocephalic  and atraumatic.     Neck: Normal range of motion.     Pulmonary/Chest: Effort normal.        Abdominal: Soft. She exhibits abdominal incision (healing well, c/d/i). She exhibits no distension and no mass. There is no tenderness.     Genitourinary:   Genitourinary Comments: Normal external female genitalia. Vagina normal, rugated, cuff intact; Uterus/cervix surgically absent. No adnexal masses palpated.                  Neurological: She is alert and oriented to person, place, and time.    Skin: Skin is warm.    Psychiatric: She has a normal mood and affect. Her behavior is normal. Judgment and thought content normal.          Assessment:        1. Abnormal uterine bleeding (AUB)    2. S/P hysterectomy             Plan:      Gloria was seen today for post-op evaluation.    Diagnoses and all orders for this visit:    Abnormal uterine bleeding (AUB)    S/P hysterectomy    Doing well postoperatively  Reviewed benign pathology  All questions answered    No orders of the defined types were placed in this encounter.      Follow up in about 1 year (around 8/21/2020) for annual.

## 2019-12-30 ENCOUNTER — OFFICE VISIT (OUTPATIENT)
Dept: OBSTETRICS AND GYNECOLOGY | Facility: CLINIC | Age: 44
End: 2019-12-30
Payer: COMMERCIAL

## 2019-12-30 VITALS
SYSTOLIC BLOOD PRESSURE: 127 MMHG | BODY MASS INDEX: 32.69 KG/M2 | HEIGHT: 69 IN | WEIGHT: 220.69 LBS | DIASTOLIC BLOOD PRESSURE: 70 MMHG

## 2019-12-30 DIAGNOSIS — R10.2 PELVIC PAIN: Primary | ICD-10-CM

## 2019-12-30 DIAGNOSIS — R14.1 GAS PAIN: ICD-10-CM

## 2019-12-30 PROCEDURE — 99214 PR OFFICE/OUTPT VISIT, EST, LEVL IV, 30-39 MIN: ICD-10-PCS | Mod: S$GLB,,, | Performed by: OBSTETRICS & GYNECOLOGY

## 2019-12-30 PROCEDURE — 3008F BODY MASS INDEX DOCD: CPT | Mod: CPTII,S$GLB,, | Performed by: OBSTETRICS & GYNECOLOGY

## 2019-12-30 PROCEDURE — 99214 OFFICE O/P EST MOD 30 MIN: CPT | Mod: S$GLB,,, | Performed by: OBSTETRICS & GYNECOLOGY

## 2019-12-30 PROCEDURE — 3008F PR BODY MASS INDEX (BMI) DOCUMENTED: ICD-10-PCS | Mod: CPTII,S$GLB,, | Performed by: OBSTETRICS & GYNECOLOGY

## 2019-12-30 RX ORDER — TRAZODONE HYDROCHLORIDE 100 MG/1
TABLET ORAL
COMMUNITY
Start: 2019-10-18 | End: 2021-12-15

## 2019-12-30 RX ORDER — SUMATRIPTAN SUCCINATE 100 MG/1
TABLET ORAL
COMMUNITY
Start: 2019-11-01 | End: 2021-02-19

## 2019-12-30 RX ORDER — TOPIRAMATE 200 MG/1
TABLET ORAL
COMMUNITY
Start: 2019-11-01 | End: 2021-02-19

## 2019-12-30 RX ORDER — FAMOTIDINE 40 MG/1
TABLET, FILM COATED ORAL
COMMUNITY
Start: 2019-10-17 | End: 2021-02-19

## 2019-12-30 RX ORDER — METHYLPREDNISOLONE 4 MG/1
TABLET ORAL
COMMUNITY
Start: 2019-10-07 | End: 2021-02-19

## 2019-12-30 RX ORDER — CHOLESTYRAMINE 4 G/5.5G
POWDER, FOR SUSPENSION ORAL
COMMUNITY
Start: 2019-10-17 | End: 2021-05-25

## 2019-12-30 NOTE — PROGRESS NOTES
Subjective:       Patient ID: Gloria Chew is a 44 y.o. female.    Chief Complaint:  Abdominal Pain (pt. states she ie expriencing pain in the lower rigth side of her abdomen)    History of Present Illness:  HPI  43 y/o  presents for evaluation of abdominal pain. She is s/p TLH/BS 6 months ago. Denies vaginal bleeding. Reports RLQ pain intermittently. Reports soreness at the right lower quadrant incison.   She reports gas throughout the day but denies constipation. She is going back to work () next week. Reports weight gain secondary to steroid use and sedentary lifestyle while recovering from bilateral foot surgeries.     GYN & OB History  No LMP recorded (lmp unknown). Patient has had a hysterectomy.   Date of Last Pap: 10/8/2018    OB History    Para Term  AB Living   2 2       2   SAB TAB Ectopic Multiple Live Births           2      # Outcome Date GA Lbr Dileep/2nd Weight Sex Delivery Anes PTL Lv   2 Para            1 Para                Review of Systems  Review of Systems   Constitutional: Negative for chills, diaphoresis, fatigue and fever.   Respiratory: Negative for cough and shortness of breath.    Cardiovascular: Negative for chest pain and palpitations.   Gastrointestinal: Positive for abdominal pain and bloating. Negative for constipation, diarrhea, nausea and vomiting.   Genitourinary: Positive for pelvic pain. Negative for bladder incontinence, dysmenorrhea, dyspareunia, frequency, hematuria, menstrual problem, vaginal bleeding, vaginal discharge, vaginal pain and urinary incontinence.   Neurological: Negative for headaches.   Psychiatric/Behavioral: Negative for depression. The patient is not nervous/anxious.            Objective:    Physical Exam:   Constitutional: She is oriented to person, place, and time. She appears well-developed and well-nourished. No distress.    HENT:   Head: Normocephalic and atraumatic.    Eyes: EOM are normal.    Neck: Normal range of  motion.     Pulmonary/Chest: Effort normal.        Abdominal: Soft. Bowel sounds are normal. She exhibits abdominal incision (well-healed, RLQ incision with tender scar tissue beneath skin). She exhibits no distension and no mass. There is tenderness (bilateral lower pelvis to deep palpation). There is no rebound and no guarding. No hernia.             Musculoskeletal: Normal range of motion and moves all extremeties.       Neurological: She is alert and oriented to person, place, and time.    Skin: Skin is warm. No rash noted.    Psychiatric: She has a normal mood and affect. Her behavior is normal. Judgment and thought content normal.          Assessment:        1. Pelvic pain    2. Gas pain             Plan:      Gloria was seen today for abdominal pain.    Diagnoses and all orders for this visit:    Pelvic pain    Gas pain    Exam benign  Tenderness RLQ incision - recommended massaging this area  Discussed bowel regimen (stool softeners, fiber, hydration)  Discussed pelvic floor PT  Discussed weight loss  If no improvement, consider pelvic US to assess ovaries as cause of pelvic pain    No orders of the defined types were placed in this encounter.      Follow up if symptoms worsen or fail to improve.

## 2021-02-19 ENCOUNTER — OFFICE VISIT (OUTPATIENT)
Dept: RHEUMATOLOGY | Facility: CLINIC | Age: 46
End: 2021-02-19
Payer: COMMERCIAL

## 2021-02-19 ENCOUNTER — LAB VISIT (OUTPATIENT)
Dept: LAB | Facility: HOSPITAL | Age: 46
End: 2021-02-19
Attending: INTERNAL MEDICINE
Payer: COMMERCIAL

## 2021-02-19 VITALS
SYSTOLIC BLOOD PRESSURE: 125 MMHG | HEART RATE: 75 BPM | DIASTOLIC BLOOD PRESSURE: 70 MMHG | WEIGHT: 240 LBS | BODY MASS INDEX: 35.44 KG/M2

## 2021-02-19 DIAGNOSIS — M25.521 RIGHT ELBOW PAIN: ICD-10-CM

## 2021-02-19 DIAGNOSIS — M54.41 CHRONIC MIDLINE LOW BACK PAIN WITH BILATERAL SCIATICA: Primary | ICD-10-CM

## 2021-02-19 DIAGNOSIS — M79.7 FIBROMYALGIA: ICD-10-CM

## 2021-02-19 DIAGNOSIS — M54.42 CHRONIC MIDLINE LOW BACK PAIN WITH BILATERAL SCIATICA: Primary | ICD-10-CM

## 2021-02-19 DIAGNOSIS — G89.29 CHRONIC MIDLINE LOW BACK PAIN WITH BILATERAL SCIATICA: ICD-10-CM

## 2021-02-19 DIAGNOSIS — M54.42 CHRONIC MIDLINE LOW BACK PAIN WITH BILATERAL SCIATICA: ICD-10-CM

## 2021-02-19 DIAGNOSIS — M47.22 OSTEOARTHRITIS OF SPINE WITH RADICULOPATHY, CERVICAL REGION: ICD-10-CM

## 2021-02-19 DIAGNOSIS — G89.4 CHRONIC PAIN SYNDROME: ICD-10-CM

## 2021-02-19 DIAGNOSIS — M54.41 CHRONIC MIDLINE LOW BACK PAIN WITH BILATERAL SCIATICA: ICD-10-CM

## 2021-02-19 DIAGNOSIS — G89.29 CHRONIC MIDLINE LOW BACK PAIN WITH BILATERAL SCIATICA: Primary | ICD-10-CM

## 2021-02-19 LAB
CCP AB SER IA-ACNC: <0.5 U/ML
CRP SERPL-MCNC: 8.9 MG/L (ref 0–8.2)
ERYTHROCYTE [SEDIMENTATION RATE] IN BLOOD BY WESTERGREN METHOD: 54 MM/HR (ref 0–36)
HBV CORE AB SERPL QL IA: NEGATIVE
HBV CORE AB SERPL QL IA: NEGATIVE
HBV SURFACE AB SER-ACNC: NEGATIVE M[IU]/ML
HCV AB SERPL QL IA: NEGATIVE
HIV 1+2 AB+HIV1 P24 AG SERPL QL IA: NEGATIVE
RHEUMATOID FACT SERPL-ACNC: 146 IU/ML (ref 0–15)

## 2021-02-19 PROCEDURE — 3008F PR BODY MASS INDEX (BMI) DOCUMENTED: ICD-10-PCS | Mod: CPTII,S$GLB,, | Performed by: INTERNAL MEDICINE

## 2021-02-19 PROCEDURE — 1125F AMNT PAIN NOTED PAIN PRSNT: CPT | Mod: S$GLB,,, | Performed by: INTERNAL MEDICINE

## 2021-02-19 PROCEDURE — 99999 PR PBB SHADOW E&M-EST. PATIENT-LVL IV: ICD-10-PCS | Mod: PBBFAC,,, | Performed by: INTERNAL MEDICINE

## 2021-02-19 PROCEDURE — 86140 C-REACTIVE PROTEIN: CPT

## 2021-02-19 PROCEDURE — 99205 OFFICE O/P NEW HI 60 MIN: CPT | Mod: S$GLB,,, | Performed by: INTERNAL MEDICINE

## 2021-02-19 PROCEDURE — 86704 HEP B CORE ANTIBODY TOTAL: CPT

## 2021-02-19 PROCEDURE — 86431 RHEUMATOID FACTOR QUANT: CPT

## 2021-02-19 PROCEDURE — 99205 PR OFFICE/OUTPT VISIT, NEW, LEVL V, 60-74 MIN: ICD-10-PCS | Mod: S$GLB,,, | Performed by: INTERNAL MEDICINE

## 2021-02-19 PROCEDURE — 86803 HEPATITIS C AB TEST: CPT

## 2021-02-19 PROCEDURE — 86706 HEP B SURFACE ANTIBODY: CPT

## 2021-02-19 PROCEDURE — 1125F PR PAIN SEVERITY QUANTIFIED, PAIN PRESENT: ICD-10-PCS | Mod: S$GLB,,, | Performed by: INTERNAL MEDICINE

## 2021-02-19 PROCEDURE — 85652 RBC SED RATE AUTOMATED: CPT

## 2021-02-19 PROCEDURE — 86703 HIV-1/HIV-2 1 RESULT ANTBDY: CPT

## 2021-02-19 PROCEDURE — 3008F BODY MASS INDEX DOCD: CPT | Mod: CPTII,S$GLB,, | Performed by: INTERNAL MEDICINE

## 2021-02-19 PROCEDURE — 99999 PR PBB SHADOW E&M-EST. PATIENT-LVL IV: CPT | Mod: PBBFAC,,, | Performed by: INTERNAL MEDICINE

## 2021-02-19 PROCEDURE — 86200 CCP ANTIBODY: CPT

## 2021-02-19 RX ORDER — OXYCODONE AND ACETAMINOPHEN 5; 325 MG/1; MG/1
1 TABLET ORAL EVERY 8 HOURS PRN
Qty: 21 TABLET | Refills: 0 | Status: SHIPPED | OUTPATIENT
Start: 2021-02-19 | End: 2021-02-26

## 2021-02-19 ASSESSMENT — ROUTINE ASSESSMENT OF PATIENT INDEX DATA (RAPID3)
TOTAL RAPID3 SCORE: 5.94
AM STIFFNESS SCORE: 1, YES
PAIN SCORE: 8.5
PSYCHOLOGICAL DISTRESS SCORE: 3.3
FATIGUE SCORE: 5
MDHAQ FUNCTION SCORE: 1.3
PATIENT GLOBAL ASSESSMENT SCORE: 5

## 2021-02-23 ENCOUNTER — PATIENT MESSAGE (OUTPATIENT)
Dept: RHEUMATOLOGY | Facility: CLINIC | Age: 46
End: 2021-02-23

## 2021-03-01 ENCOUNTER — OFFICE VISIT (OUTPATIENT)
Dept: ORTHOPEDICS | Facility: CLINIC | Age: 46
End: 2021-03-01
Payer: COMMERCIAL

## 2021-03-01 ENCOUNTER — HOSPITAL ENCOUNTER (OUTPATIENT)
Dept: RADIOLOGY | Facility: HOSPITAL | Age: 46
Discharge: HOME | End: 2021-03-01
Attending: ORTHOPAEDIC SURGERY
Payer: COMMERCIAL

## 2021-03-01 VITALS — BODY MASS INDEX: 35.56 KG/M2 | HEIGHT: 69 IN | WEIGHT: 240.06 LBS

## 2021-03-01 DIAGNOSIS — M54.41 CHRONIC MIDLINE LOW BACK PAIN WITH BILATERAL SCIATICA: ICD-10-CM

## 2021-03-01 DIAGNOSIS — M47.22 OSTEOARTHRITIS OF SPINE WITH RADICULOPATHY, CERVICAL REGION: ICD-10-CM

## 2021-03-01 DIAGNOSIS — M54.42 CHRONIC MIDLINE LOW BACK PAIN WITH BILATERAL SCIATICA: ICD-10-CM

## 2021-03-01 DIAGNOSIS — G89.4 CHRONIC PAIN SYNDROME: ICD-10-CM

## 2021-03-01 DIAGNOSIS — M79.7 FIBROMYALGIA: ICD-10-CM

## 2021-03-01 DIAGNOSIS — M51.36 DDD (DEGENERATIVE DISC DISEASE), LUMBAR: ICD-10-CM

## 2021-03-01 DIAGNOSIS — M25.521 RIGHT ELBOW PAIN: ICD-10-CM

## 2021-03-01 DIAGNOSIS — G89.29 CHRONIC MIDLINE LOW BACK PAIN WITH BILATERAL SCIATICA: ICD-10-CM

## 2021-03-01 PROCEDURE — 72100 X-RAY EXAM L-S SPINE 2/3 VWS: CPT | Mod: 26,,, | Performed by: RADIOLOGY

## 2021-03-01 PROCEDURE — 99999 PR PBB SHADOW E&M-EST. PATIENT-LVL III: CPT | Mod: PBBFAC,,, | Performed by: ORTHOPAEDIC SURGERY

## 2021-03-01 PROCEDURE — 72100 X-RAY EXAM L-S SPINE 2/3 VWS: CPT | Mod: TC

## 2021-03-01 PROCEDURE — 72120 X-RAY BEND ONLY L-S SPINE: CPT | Mod: 26,,, | Performed by: RADIOLOGY

## 2021-03-01 PROCEDURE — 99204 OFFICE O/P NEW MOD 45 MIN: CPT | Mod: S$GLB,,, | Performed by: ORTHOPAEDIC SURGERY

## 2021-03-01 PROCEDURE — 72120 XR LUMBAR SPINE AP AND LAT WITH FLEX/EXT: ICD-10-PCS | Mod: 26,,, | Performed by: RADIOLOGY

## 2021-03-01 PROCEDURE — 3008F PR BODY MASS INDEX (BMI) DOCUMENTED: ICD-10-PCS | Mod: CPTII,S$GLB,, | Performed by: ORTHOPAEDIC SURGERY

## 2021-03-01 PROCEDURE — 1125F PR PAIN SEVERITY QUANTIFIED, PAIN PRESENT: ICD-10-PCS | Mod: S$GLB,,, | Performed by: ORTHOPAEDIC SURGERY

## 2021-03-01 PROCEDURE — 99204 PR OFFICE/OUTPT VISIT, NEW, LEVL IV, 45-59 MIN: ICD-10-PCS | Mod: S$GLB,,, | Performed by: ORTHOPAEDIC SURGERY

## 2021-03-01 PROCEDURE — 3008F BODY MASS INDEX DOCD: CPT | Mod: CPTII,S$GLB,, | Performed by: ORTHOPAEDIC SURGERY

## 2021-03-01 PROCEDURE — 72100 XR LUMBAR SPINE AP AND LAT WITH FLEX/EXT: ICD-10-PCS | Mod: 26,,, | Performed by: RADIOLOGY

## 2021-03-01 PROCEDURE — 99999 PR PBB SHADOW E&M-EST. PATIENT-LVL III: ICD-10-PCS | Mod: PBBFAC,,, | Performed by: ORTHOPAEDIC SURGERY

## 2021-03-01 PROCEDURE — 1125F AMNT PAIN NOTED PAIN PRSNT: CPT | Mod: S$GLB,,, | Performed by: ORTHOPAEDIC SURGERY

## 2021-03-01 RX ORDER — OXYCODONE AND ACETAMINOPHEN 5; 325 MG/1; MG/1
1 TABLET ORAL EVERY 4 HOURS PRN
COMMUNITY
End: 2021-12-15

## 2021-03-01 RX ORDER — FUROSEMIDE 20 MG/1
20 TABLET ORAL 2 TIMES DAILY
COMMUNITY
End: 2023-09-13

## 2021-03-01 RX ORDER — ASPIRIN 325 MG
325 TABLET ORAL DAILY
COMMUNITY
End: 2022-08-02

## 2021-03-01 RX ORDER — PANTOPRAZOLE SODIUM 20 MG/1
20 TABLET, DELAYED RELEASE ORAL DAILY
COMMUNITY
End: 2023-09-13

## 2021-03-01 RX ORDER — BISMUTH SUBSALICYLATE 262 MG/1
TABLET ORAL
COMMUNITY
End: 2022-08-02

## 2021-03-01 RX ORDER — TRAMADOL HYDROCHLORIDE 50 MG/1
50 TABLET ORAL EVERY 6 HOURS
COMMUNITY
End: 2021-12-15

## 2021-03-01 RX ORDER — CHOLESTYRAMINE LIGHT 4 G/5.7G
POWDER, FOR SUSPENSION ORAL
COMMUNITY
End: 2021-12-15

## 2021-03-10 ENCOUNTER — OFFICE VISIT (OUTPATIENT)
Dept: ORTHOPEDICS | Facility: CLINIC | Age: 46
End: 2021-03-10
Payer: COMMERCIAL

## 2021-03-10 VITALS — WEIGHT: 240.06 LBS | HEIGHT: 69 IN | BODY MASS INDEX: 35.56 KG/M2

## 2021-03-10 DIAGNOSIS — M54.42 CHRONIC MIDLINE LOW BACK PAIN WITH BILATERAL SCIATICA: Primary | ICD-10-CM

## 2021-03-10 DIAGNOSIS — M54.41 CHRONIC MIDLINE LOW BACK PAIN WITH BILATERAL SCIATICA: Primary | ICD-10-CM

## 2021-03-10 DIAGNOSIS — G89.29 CHRONIC MIDLINE LOW BACK PAIN WITH BILATERAL SCIATICA: Primary | ICD-10-CM

## 2021-03-10 PROCEDURE — 1125F AMNT PAIN NOTED PAIN PRSNT: CPT | Mod: S$GLB,,, | Performed by: ORTHOPAEDIC SURGERY

## 2021-03-10 PROCEDURE — 99213 OFFICE O/P EST LOW 20 MIN: CPT | Mod: S$GLB,,, | Performed by: ORTHOPAEDIC SURGERY

## 2021-03-10 PROCEDURE — 99213 PR OFFICE/OUTPT VISIT, EST, LEVL III, 20-29 MIN: ICD-10-PCS | Mod: S$GLB,,, | Performed by: ORTHOPAEDIC SURGERY

## 2021-03-10 PROCEDURE — 1125F PR PAIN SEVERITY QUANTIFIED, PAIN PRESENT: ICD-10-PCS | Mod: S$GLB,,, | Performed by: ORTHOPAEDIC SURGERY

## 2021-03-10 PROCEDURE — 3008F BODY MASS INDEX DOCD: CPT | Mod: CPTII,S$GLB,, | Performed by: ORTHOPAEDIC SURGERY

## 2021-03-10 PROCEDURE — 99999 PR PBB SHADOW E&M-EST. PATIENT-LVL III: CPT | Mod: PBBFAC,,, | Performed by: ORTHOPAEDIC SURGERY

## 2021-03-10 PROCEDURE — 99999 PR PBB SHADOW E&M-EST. PATIENT-LVL III: ICD-10-PCS | Mod: PBBFAC,,, | Performed by: ORTHOPAEDIC SURGERY

## 2021-03-10 PROCEDURE — 3008F PR BODY MASS INDEX (BMI) DOCUMENTED: ICD-10-PCS | Mod: CPTII,S$GLB,, | Performed by: ORTHOPAEDIC SURGERY

## 2021-03-11 ENCOUNTER — TELEPHONE (OUTPATIENT)
Dept: PAIN MEDICINE | Facility: OTHER | Age: 46
End: 2021-03-11

## 2021-03-12 ENCOUNTER — TELEPHONE (OUTPATIENT)
Dept: PAIN MEDICINE | Facility: OTHER | Age: 46
End: 2021-03-12

## 2021-03-12 ENCOUNTER — PATIENT MESSAGE (OUTPATIENT)
Dept: PAIN MEDICINE | Facility: OTHER | Age: 46
End: 2021-03-12

## 2021-03-12 DIAGNOSIS — M54.41 CHRONIC MIDLINE LOW BACK PAIN WITH BILATERAL SCIATICA: Primary | ICD-10-CM

## 2021-03-12 DIAGNOSIS — G89.29 CHRONIC MIDLINE LOW BACK PAIN WITH BILATERAL SCIATICA: Primary | ICD-10-CM

## 2021-03-12 DIAGNOSIS — M54.42 CHRONIC MIDLINE LOW BACK PAIN WITH BILATERAL SCIATICA: Primary | ICD-10-CM

## 2021-03-18 ENCOUNTER — HOSPITAL ENCOUNTER (OUTPATIENT)
Facility: OTHER | Age: 46
Discharge: HOME | End: 2021-03-18
Attending: ANESTHESIOLOGY | Admitting: ANESTHESIOLOGY
Payer: COMMERCIAL

## 2021-03-18 VITALS
SYSTOLIC BLOOD PRESSURE: 140 MMHG | BODY MASS INDEX: 35.55 KG/M2 | TEMPERATURE: 99 F | HEIGHT: 69 IN | DIASTOLIC BLOOD PRESSURE: 81 MMHG | OXYGEN SATURATION: 100 % | HEART RATE: 70 BPM | RESPIRATION RATE: 18 BRPM | WEIGHT: 240 LBS

## 2021-03-18 DIAGNOSIS — G89.29 CHRONIC PAIN: ICD-10-CM

## 2021-03-18 DIAGNOSIS — M54.16 LUMBAR RADICULOPATHY: Primary | ICD-10-CM

## 2021-03-18 LAB — POCT GLUCOSE: 79 MG/DL (ref 70–110)

## 2021-03-18 PROCEDURE — 64483 NJX AA&/STRD TFRM EPI L/S 1: CPT | Mod: 50 | Performed by: ANESTHESIOLOGY

## 2021-03-18 PROCEDURE — 25000003 PHARM REV CODE 250: Performed by: ANESTHESIOLOGY

## 2021-03-18 PROCEDURE — 63600175 PHARM REV CODE 636 W HCPCS: Performed by: ANESTHESIOLOGY

## 2021-03-18 PROCEDURE — 64483 PR EPIDURAL INJ, ANES/STEROID, TRANSFORAMINAL, LUMB/SACR, SNGL LEVL: ICD-10-PCS | Mod: 50,,, | Performed by: ANESTHESIOLOGY

## 2021-03-18 PROCEDURE — 25500020 PHARM REV CODE 255: Performed by: ANESTHESIOLOGY

## 2021-03-18 PROCEDURE — 64483 NJX AA&/STRD TFRM EPI L/S 1: CPT | Mod: 50,,, | Performed by: ANESTHESIOLOGY

## 2021-03-18 RX ORDER — INDOMETHACIN 25 MG/1
CAPSULE ORAL
Status: DISCONTINUED | OUTPATIENT
Start: 2021-03-18 | End: 2021-03-18 | Stop reason: HOSPADM

## 2021-03-18 RX ORDER — DEXAMETHASONE SODIUM PHOSPHATE 10 MG/ML
INJECTION INTRAMUSCULAR; INTRAVENOUS
Status: DISCONTINUED | OUTPATIENT
Start: 2021-03-18 | End: 2021-03-18 | Stop reason: HOSPADM

## 2021-03-18 RX ORDER — FENTANYL CITRATE 50 UG/ML
INJECTION, SOLUTION INTRAMUSCULAR; INTRAVENOUS
Status: DISCONTINUED | OUTPATIENT
Start: 2021-03-18 | End: 2021-03-18 | Stop reason: HOSPADM

## 2021-03-18 RX ORDER — SODIUM CHLORIDE 9 MG/ML
INJECTION, SOLUTION INTRAVENOUS CONTINUOUS
Status: DISCONTINUED | OUTPATIENT
Start: 2021-03-18 | End: 2021-03-18 | Stop reason: HOSPADM

## 2021-03-18 RX ORDER — LIDOCAINE HYDROCHLORIDE 5 MG/ML
INJECTION, SOLUTION INFILTRATION; INTRAVENOUS
Status: DISCONTINUED | OUTPATIENT
Start: 2021-03-18 | End: 2021-03-18 | Stop reason: HOSPADM

## 2021-03-18 RX ORDER — SODIUM CHLORIDE, SODIUM LACTATE, POTASSIUM CHLORIDE, CALCIUM CHLORIDE 600; 310; 30; 20 MG/100ML; MG/100ML; MG/100ML; MG/100ML
INJECTION, SOLUTION INTRAVENOUS CONTINUOUS
Status: DISCONTINUED | OUTPATIENT
Start: 2021-03-18 | End: 2021-03-18 | Stop reason: HOSPADM

## 2021-03-18 RX ORDER — LIDOCAINE HYDROCHLORIDE 10 MG/ML
INJECTION INFILTRATION; PERINEURAL
Status: DISCONTINUED | OUTPATIENT
Start: 2021-03-18 | End: 2021-03-18 | Stop reason: HOSPADM

## 2021-03-18 RX ORDER — METFORMIN HYDROCHLORIDE 500 MG/1
500 TABLET ORAL
COMMUNITY
End: 2022-06-03

## 2021-03-18 RX ADMIN — SODIUM CHLORIDE: 0.9 INJECTION, SOLUTION INTRAVENOUS at 11:03

## 2021-04-16 ENCOUNTER — PATIENT MESSAGE (OUTPATIENT)
Dept: RESEARCH | Facility: HOSPITAL | Age: 46
End: 2021-04-16

## 2021-05-25 ENCOUNTER — OFFICE VISIT (OUTPATIENT)
Dept: RHEUMATOLOGY | Facility: CLINIC | Age: 46
End: 2021-05-25
Payer: COMMERCIAL

## 2021-05-25 VITALS
BODY MASS INDEX: 36.41 KG/M2 | HEART RATE: 95 BPM | SYSTOLIC BLOOD PRESSURE: 140 MMHG | HEIGHT: 69 IN | WEIGHT: 245.81 LBS | DIASTOLIC BLOOD PRESSURE: 85 MMHG

## 2021-05-25 DIAGNOSIS — M05.741 RHEUMATOID ARTHRITIS INVOLVING BOTH HANDS WITH POSITIVE RHEUMATOID FACTOR: Primary | ICD-10-CM

## 2021-05-25 DIAGNOSIS — M05.742 RHEUMATOID ARTHRITIS INVOLVING BOTH HANDS WITH POSITIVE RHEUMATOID FACTOR: Primary | ICD-10-CM

## 2021-05-25 PROCEDURE — 1125F PR PAIN SEVERITY QUANTIFIED, PAIN PRESENT: ICD-10-PCS | Mod: S$GLB,,, | Performed by: INTERNAL MEDICINE

## 2021-05-25 PROCEDURE — 3008F PR BODY MASS INDEX (BMI) DOCUMENTED: ICD-10-PCS | Mod: CPTII,S$GLB,, | Performed by: INTERNAL MEDICINE

## 2021-05-25 PROCEDURE — 3008F BODY MASS INDEX DOCD: CPT | Mod: CPTII,S$GLB,, | Performed by: INTERNAL MEDICINE

## 2021-05-25 PROCEDURE — 1125F AMNT PAIN NOTED PAIN PRSNT: CPT | Mod: S$GLB,,, | Performed by: INTERNAL MEDICINE

## 2021-05-25 PROCEDURE — 99999 PR PBB SHADOW E&M-EST. PATIENT-LVL III: ICD-10-PCS | Mod: PBBFAC,,, | Performed by: INTERNAL MEDICINE

## 2021-05-25 PROCEDURE — 99214 PR OFFICE/OUTPT VISIT, EST, LEVL IV, 30-39 MIN: ICD-10-PCS | Mod: S$GLB,,, | Performed by: INTERNAL MEDICINE

## 2021-05-25 PROCEDURE — 99214 OFFICE O/P EST MOD 30 MIN: CPT | Mod: S$GLB,,, | Performed by: INTERNAL MEDICINE

## 2021-05-25 PROCEDURE — 99999 PR PBB SHADOW E&M-EST. PATIENT-LVL III: CPT | Mod: PBBFAC,,, | Performed by: INTERNAL MEDICINE

## 2021-05-25 RX ORDER — OXYCODONE HYDROCHLORIDE 5 MG/1
5 TABLET ORAL 4 TIMES DAILY PRN
COMMUNITY
Start: 2021-05-17 | End: 2021-12-15

## 2021-05-25 RX ORDER — DOXYCYCLINE 100 MG/1
1 TABLET ORAL 2 TIMES DAILY
COMMUNITY
Start: 2021-05-17 | End: 2021-05-25

## 2021-05-25 RX ORDER — CELECOXIB 200 MG/1
200 CAPSULE ORAL 2 TIMES DAILY
COMMUNITY
Start: 2021-04-12 | End: 2021-12-15

## 2021-05-25 RX ORDER — CHOLESTYRAMINE 4 G/9G
POWDER, FOR SUSPENSION ORAL
COMMUNITY
Start: 2020-05-01 | End: 2023-09-13

## 2021-05-25 RX ORDER — GABAPENTIN 600 MG/1
600 TABLET ORAL 3 TIMES DAILY
Qty: 90 TABLET | Refills: 5 | Status: SHIPPED | OUTPATIENT
Start: 2021-05-25 | End: 2021-12-15

## 2021-05-25 RX ORDER — HYDROXYCHLOROQUINE SULFATE 200 MG/1
200 TABLET, FILM COATED ORAL 2 TIMES DAILY
Qty: 60 TABLET | Refills: 5 | Status: SHIPPED | OUTPATIENT
Start: 2021-05-25 | End: 2021-06-19

## 2021-05-25 RX ORDER — CLOTRIMAZOLE 1 G/ML
SOLUTION TOPICAL
COMMUNITY
Start: 2020-06-09 | End: 2021-05-25

## 2021-05-25 RX ORDER — PROMETHAZINE HYDROCHLORIDE 25 MG/1
25 TABLET ORAL EVERY 6 HOURS PRN
COMMUNITY
Start: 2021-05-17 | End: 2021-12-15

## 2021-05-25 RX ORDER — SUMATRIPTAN SUCCINATE 100 MG/1
TABLET ORAL
COMMUNITY
Start: 2020-11-18 | End: 2021-05-25

## 2021-06-19 ENCOUNTER — PATIENT MESSAGE (OUTPATIENT)
Dept: RHEUMATOLOGY | Facility: CLINIC | Age: 46
End: 2021-06-19

## 2021-06-19 RX ORDER — HYDROXYCHLOROQUINE SULFATE 200 MG/1
200 TABLET, FILM COATED ORAL 2 TIMES DAILY
Qty: 60 TABLET | Refills: 5 | Status: SHIPPED | OUTPATIENT
Start: 2021-06-19 | End: 2021-10-14

## 2021-06-21 ENCOUNTER — PATIENT MESSAGE (OUTPATIENT)
Dept: PHARMACY | Facility: CLINIC | Age: 46
End: 2021-06-21

## 2021-07-20 ENCOUNTER — PATIENT MESSAGE (OUTPATIENT)
Dept: PHARMACY | Facility: CLINIC | Age: 46
End: 2021-07-20

## 2021-07-27 ENCOUNTER — OFFICE VISIT (OUTPATIENT)
Dept: OBSTETRICS AND GYNECOLOGY | Facility: CLINIC | Age: 46
End: 2021-07-27
Payer: COMMERCIAL

## 2021-07-27 VITALS
DIASTOLIC BLOOD PRESSURE: 88 MMHG | HEIGHT: 69 IN | BODY MASS INDEX: 34.45 KG/M2 | WEIGHT: 232.56 LBS | SYSTOLIC BLOOD PRESSURE: 128 MMHG

## 2021-07-27 DIAGNOSIS — Z12.39 ENCOUNTER FOR SCREENING FOR MALIGNANT NEOPLASM OF BREAST, UNSPECIFIED SCREENING MODALITY: ICD-10-CM

## 2021-07-27 DIAGNOSIS — Z01.419 WELL WOMAN EXAM WITH ROUTINE GYNECOLOGICAL EXAM: Primary | ICD-10-CM

## 2021-07-27 PROCEDURE — 3008F BODY MASS INDEX DOCD: CPT | Mod: CPTII,S$GLB,, | Performed by: OBSTETRICS & GYNECOLOGY

## 2021-07-27 PROCEDURE — 1160F PR REVIEW ALL MEDS BY PRESCRIBER/CLIN PHARMACIST DOCUMENTED: ICD-10-PCS | Mod: CPTII,S$GLB,, | Performed by: OBSTETRICS & GYNECOLOGY

## 2021-07-27 PROCEDURE — 99396 PR PREVENTIVE VISIT,EST,40-64: ICD-10-PCS | Mod: S$GLB,,, | Performed by: OBSTETRICS & GYNECOLOGY

## 2021-07-27 PROCEDURE — 1126F AMNT PAIN NOTED NONE PRSNT: CPT | Mod: CPTII,S$GLB,, | Performed by: OBSTETRICS & GYNECOLOGY

## 2021-07-27 PROCEDURE — 99999 PR PBB SHADOW E&M-EST. PATIENT-LVL III: ICD-10-PCS | Mod: PBBFAC,,, | Performed by: OBSTETRICS & GYNECOLOGY

## 2021-07-27 PROCEDURE — 1126F PR PAIN SEVERITY QUANTIFIED, NO PAIN PRESENT: ICD-10-PCS | Mod: CPTII,S$GLB,, | Performed by: OBSTETRICS & GYNECOLOGY

## 2021-07-27 PROCEDURE — 1160F RVW MEDS BY RX/DR IN RCRD: CPT | Mod: CPTII,S$GLB,, | Performed by: OBSTETRICS & GYNECOLOGY

## 2021-07-27 PROCEDURE — 99396 PREV VISIT EST AGE 40-64: CPT | Mod: S$GLB,,, | Performed by: OBSTETRICS & GYNECOLOGY

## 2021-07-27 PROCEDURE — 99999 PR PBB SHADOW E&M-EST. PATIENT-LVL III: CPT | Mod: PBBFAC,,, | Performed by: OBSTETRICS & GYNECOLOGY

## 2021-07-27 PROCEDURE — 3008F PR BODY MASS INDEX (BMI) DOCUMENTED: ICD-10-PCS | Mod: CPTII,S$GLB,, | Performed by: OBSTETRICS & GYNECOLOGY

## 2021-07-27 PROCEDURE — 1159F PR MEDICATION LIST DOCUMENTED IN MEDICAL RECORD: ICD-10-PCS | Mod: CPTII,S$GLB,, | Performed by: OBSTETRICS & GYNECOLOGY

## 2021-07-27 PROCEDURE — 1159F MED LIST DOCD IN RCRD: CPT | Mod: CPTII,S$GLB,, | Performed by: OBSTETRICS & GYNECOLOGY

## 2021-08-16 ENCOUNTER — LAB VISIT (OUTPATIENT)
Dept: LAB | Facility: HOSPITAL | Age: 46
End: 2021-08-16
Attending: INTERNAL MEDICINE
Payer: COMMERCIAL

## 2021-08-16 DIAGNOSIS — M05.741 RHEUMATOID ARTHRITIS INVOLVING BOTH HANDS WITH POSITIVE RHEUMATOID FACTOR: ICD-10-CM

## 2021-08-16 DIAGNOSIS — M05.742 RHEUMATOID ARTHRITIS INVOLVING BOTH HANDS WITH POSITIVE RHEUMATOID FACTOR: ICD-10-CM

## 2021-08-16 LAB
CRP SERPL-MCNC: 9.5 MG/L (ref 0–8.2)
ERYTHROCYTE [SEDIMENTATION RATE] IN BLOOD BY WESTERGREN METHOD: 51 MM/HR (ref 0–36)

## 2021-08-16 PROCEDURE — 85652 RBC SED RATE AUTOMATED: CPT | Performed by: INTERNAL MEDICINE

## 2021-08-16 PROCEDURE — 86140 C-REACTIVE PROTEIN: CPT | Performed by: INTERNAL MEDICINE

## 2021-08-16 PROCEDURE — 36415 COLL VENOUS BLD VENIPUNCTURE: CPT | Performed by: INTERNAL MEDICINE

## 2021-09-07 ENCOUNTER — TELEPHONE (OUTPATIENT)
Dept: RHEUMATOLOGY | Facility: CLINIC | Age: 46
End: 2021-09-07

## 2021-12-15 ENCOUNTER — OFFICE VISIT (OUTPATIENT)
Dept: RHEUMATOLOGY | Facility: CLINIC | Age: 46
End: 2021-12-15
Payer: OTHER GOVERNMENT

## 2021-12-15 VITALS
BODY MASS INDEX: 34.9 KG/M2 | SYSTOLIC BLOOD PRESSURE: 138 MMHG | WEIGHT: 236.31 LBS | HEART RATE: 76 BPM | DIASTOLIC BLOOD PRESSURE: 88 MMHG

## 2021-12-15 DIAGNOSIS — R21 SKIN RASH: Primary | ICD-10-CM

## 2021-12-15 DIAGNOSIS — G56.03 BILATERAL CARPAL TUNNEL SYNDROME: ICD-10-CM

## 2021-12-15 DIAGNOSIS — M05.742 RHEUMATOID ARTHRITIS INVOLVING BOTH HANDS WITH POSITIVE RHEUMATOID FACTOR: ICD-10-CM

## 2021-12-15 DIAGNOSIS — M05.741 RHEUMATOID ARTHRITIS INVOLVING BOTH HANDS WITH POSITIVE RHEUMATOID FACTOR: ICD-10-CM

## 2021-12-15 DIAGNOSIS — M79.7 FIBROMYALGIA: ICD-10-CM

## 2021-12-15 DIAGNOSIS — M47.22 OSTEOARTHRITIS OF SPINE WITH RADICULOPATHY, CERVICAL REGION: ICD-10-CM

## 2021-12-15 PROCEDURE — 99214 OFFICE O/P EST MOD 30 MIN: CPT | Mod: S$PBB,,, | Performed by: INTERNAL MEDICINE

## 2021-12-15 PROCEDURE — 99214 PR OFFICE/OUTPT VISIT, EST, LEVL IV, 30-39 MIN: ICD-10-PCS | Mod: S$PBB,,, | Performed by: INTERNAL MEDICINE

## 2021-12-15 PROCEDURE — 99999 PR PBB SHADOW E&M-EST. PATIENT-LVL IV: CPT | Mod: PBBFAC,,, | Performed by: INTERNAL MEDICINE

## 2021-12-15 PROCEDURE — 99999 PR PBB SHADOW E&M-EST. PATIENT-LVL IV: ICD-10-PCS | Mod: PBBFAC,,, | Performed by: INTERNAL MEDICINE

## 2021-12-15 PROCEDURE — 99214 OFFICE O/P EST MOD 30 MIN: CPT | Mod: PBBFAC | Performed by: INTERNAL MEDICINE

## 2021-12-15 RX ORDER — FOLIC ACID 1 MG/1
1 TABLET ORAL DAILY
Qty: 30 TABLET | Refills: 4 | Status: SHIPPED | OUTPATIENT
Start: 2021-12-15 | End: 2022-03-07

## 2021-12-15 RX ORDER — ADALIMUMAB 40MG/0.8ML
40 KIT SUBCUTANEOUS
Qty: 2 PEN | Refills: 11 | Status: SHIPPED | OUTPATIENT
Start: 2021-12-15 | End: 2022-01-24

## 2021-12-15 RX ORDER — HYDROXYCHLOROQUINE SULFATE 200 MG/1
200 TABLET, FILM COATED ORAL 2 TIMES DAILY
Qty: 60 TABLET | Refills: 5 | Status: SHIPPED | OUTPATIENT
Start: 2021-12-15 | End: 2021-12-15

## 2021-12-15 RX ORDER — METHOTREXATE 2.5 MG/1
TABLET ORAL
Qty: 25 TABLET | Refills: 5 | Status: SHIPPED | OUTPATIENT
Start: 2021-12-15 | End: 2022-02-05 | Stop reason: SDUPTHER

## 2021-12-15 RX ORDER — TRAZODONE HYDROCHLORIDE 50 MG/1
50 TABLET ORAL NIGHTLY
Qty: 30 TABLET | Refills: 5 | Status: SHIPPED | OUTPATIENT
Start: 2021-12-15 | End: 2022-06-03

## 2021-12-15 RX ORDER — GABAPENTIN 600 MG/1
600 TABLET ORAL 3 TIMES DAILY
Qty: 90 TABLET | Refills: 5 | Status: SHIPPED | OUTPATIENT
Start: 2021-12-15 | End: 2022-02-05 | Stop reason: SDUPTHER

## 2021-12-15 RX ORDER — HYDROXYCHLOROQUINE SULFATE 200 MG/1
200 TABLET, FILM COATED ORAL 2 TIMES DAILY
Qty: 60 TABLET | Refills: 5 | Status: SHIPPED | OUTPATIENT
Start: 2021-12-15 | End: 2022-01-14

## 2021-12-15 RX ORDER — CYCLOBENZAPRINE HCL 10 MG
10 TABLET ORAL 3 TIMES DAILY
Qty: 90 TABLET | Refills: 5 | Status: SHIPPED | OUTPATIENT
Start: 2021-12-15 | End: 2022-05-23

## 2021-12-31 ENCOUNTER — SPECIALTY PHARMACY (OUTPATIENT)
Dept: PHARMACY | Facility: CLINIC | Age: 46
End: 2021-12-31
Payer: OTHER GOVERNMENT

## 2021-12-31 ENCOUNTER — TELEPHONE (OUTPATIENT)
Dept: ORTHOPEDICS | Facility: CLINIC | Age: 46
End: 2021-12-31
Payer: OTHER GOVERNMENT

## 2022-01-01 DIAGNOSIS — D84.9 IMMUNOSUPPRESSION: Primary | ICD-10-CM

## 2022-01-02 ENCOUNTER — PATIENT MESSAGE (OUTPATIENT)
Dept: ADMINISTRATIVE | Facility: OTHER | Age: 47
End: 2022-01-02
Payer: OTHER GOVERNMENT

## 2022-01-04 ENCOUNTER — LAB VISIT (OUTPATIENT)
Dept: LAB | Facility: HOSPITAL | Age: 47
End: 2022-01-04
Attending: INTERNAL MEDICINE
Payer: OTHER GOVERNMENT

## 2022-01-04 DIAGNOSIS — D84.9 IMMUNOSUPPRESSION: ICD-10-CM

## 2022-01-04 PROCEDURE — 36415 COLL VENOUS BLD VENIPUNCTURE: CPT | Performed by: INTERNAL MEDICINE

## 2022-01-04 PROCEDURE — 86480 TB TEST CELL IMMUN MEASURE: CPT | Performed by: INTERNAL MEDICINE

## 2022-01-06 LAB
GAMMA INTERFERON BACKGROUND BLD IA-ACNC: 0.05 IU/ML
M TB IFN-G CD4+ BCKGRND COR BLD-ACNC: 0 IU/ML
MITOGEN IGNF BCKGRD COR BLD-ACNC: >10 IU/ML
TB GOLD PLUS: NEGATIVE
TB2 - NIL: 0 IU/ML

## 2022-01-07 NOTE — TELEPHONE ENCOUNTER
Pt informed Humira approved.  Will be $5 with e-voucher. [facility]. Must fill at [facility]. Provided [facility] ph #. Advised pt to call [facility] if any trouble at [facility].

## 2022-01-07 NOTE — TELEPHONE ENCOUNTER
Negative TB on file. PA submitted via CMM  (Key: [id number])    DOCUMENTATION ONLY:  Prior authorization for Humria approved from 12/8/2021 to 1/7/2023    Co-pay: $350- $5.00 with jassi SANTOS patient- will have to fill at [facility]. LVM to inform patient and forwarding prescription. Closing patient out at [facility].

## 2022-01-10 ENCOUNTER — PATIENT MESSAGE (OUTPATIENT)
Dept: RHEUMATOLOGY | Facility: CLINIC | Age: 47
End: 2022-01-10
Payer: OTHER GOVERNMENT

## 2022-01-24 ENCOUNTER — HOSPITAL ENCOUNTER (OUTPATIENT)
Dept: RADIOLOGY | Facility: OTHER | Age: 47
Discharge: HOME | End: 2022-01-24
Attending: ORTHOPAEDIC SURGERY
Payer: OTHER GOVERNMENT

## 2022-01-24 ENCOUNTER — OFFICE VISIT (OUTPATIENT)
Dept: ORTHOPEDICS | Facility: CLINIC | Age: 47
End: 2022-01-24
Payer: OTHER GOVERNMENT

## 2022-01-24 VITALS — BODY MASS INDEX: 34.96 KG/M2 | HEIGHT: 69 IN | WEIGHT: 236 LBS

## 2022-01-24 DIAGNOSIS — M05.79 RHEUMATOID ARTHRITIS INVOLVING MULTIPLE SITES WITH POSITIVE RHEUMATOID FACTOR: Primary | ICD-10-CM

## 2022-01-24 DIAGNOSIS — M79.641 BILATERAL HAND PAIN: ICD-10-CM

## 2022-01-24 DIAGNOSIS — M79.641 BILATERAL HAND PAIN: Primary | ICD-10-CM

## 2022-01-24 DIAGNOSIS — M79.642 BILATERAL HAND PAIN: Primary | ICD-10-CM

## 2022-01-24 DIAGNOSIS — M79.642 BILATERAL HAND PAIN: ICD-10-CM

## 2022-01-24 DIAGNOSIS — G56.03 BILATERAL CARPAL TUNNEL SYNDROME: Primary | ICD-10-CM

## 2022-01-24 PROCEDURE — 99203 PR OFFICE/OUTPT VISIT, NEW, LEVL III, 30-44 MIN: ICD-10-PCS | Mod: S$PBB,,, | Performed by: ORTHOPAEDIC SURGERY

## 2022-01-24 PROCEDURE — 73130 X-RAY EXAM OF HAND: CPT | Mod: TC,50,FY

## 2022-01-24 PROCEDURE — 99203 OFFICE O/P NEW LOW 30 MIN: CPT | Mod: S$PBB,,, | Performed by: ORTHOPAEDIC SURGERY

## 2022-01-24 PROCEDURE — 73130 XR HAND COMPLETE 3 VIEWS BILATERAL: ICD-10-PCS | Mod: 26,50,, | Performed by: RADIOLOGY

## 2022-01-24 PROCEDURE — 73130 X-RAY EXAM OF HAND: CPT | Mod: 26,50,, | Performed by: RADIOLOGY

## 2022-01-24 PROCEDURE — 99999 PR PBB SHADOW E&M-EST. PATIENT-LVL III: ICD-10-PCS | Mod: PBBFAC,,, | Performed by: ORTHOPAEDIC SURGERY

## 2022-01-24 PROCEDURE — 99213 OFFICE O/P EST LOW 20 MIN: CPT | Mod: PBBFAC | Performed by: ORTHOPAEDIC SURGERY

## 2022-01-24 PROCEDURE — 99999 PR PBB SHADOW E&M-EST. PATIENT-LVL III: CPT | Mod: PBBFAC,,, | Performed by: ORTHOPAEDIC SURGERY

## 2022-01-24 RX ORDER — ADALIMUMAB 40MG/0.8ML
40 KIT SUBCUTANEOUS
Qty: 2 PEN | Refills: 11 | OUTPATIENT
Start: 2022-01-24 | End: 2022-02-05 | Stop reason: SDUPTHER

## 2022-01-24 NOTE — LETTER
January 24, 2022    Gloria Chew  [address]  [place] [address]             [facility]  Orthopedics  [address]  [place] [address]  Phone: [phone]   January 24, 2022     Patient: Gloria Chew   YOB: 1975   Date of Visit: 1/24/2022       To Whom it May Concern:    Gloria Chew was seen in my clinic on 1/24/2022. She may return to work today.    Please excuse her from any  work missed.    If you have any questions or concerns, please don't hesitate to call.    Sincerely,         Jeremie Gates MD

## 2022-01-24 NOTE — PROGRESS NOTES
"  [facility]  History & Physical  Orthopedics    SUBJECTIVE:      COVID-19 attestation:  This patient was treated during the COVID-19 pandemic.  This was discussed with the patient, they are aware of our current policies and procedures, were given the option of delaying their visit and or switching to a virtual visit, delaying their surgery when applicable, and they elect to proceed.    Chief Complaint: BL hand pain    Referring Provider: Self, Aaareferral     History of Present Illness:  Patient is a 46 y.o. right hand dominant female who presents today with complaints of BL hand pain. This has been going on for about a year now. She c/o numbness and tingling in her first four fingers BL hands. Says "my carpal tunnel is acting up." She has tried OTC meds and wrist braces with no relief. She has been seeing rheumatology and is now being treated for RA, on plaquenil and MTX and in process of starting humera. EMG has been ordered but not completed. On [facility] ROS, reports is having right elbow pain as well, sometimes numbness in right small finger. Has had previous surgeries BUE - 2020 had right wrist surgery with Dr. Rowley for "release of pressure" (has scars over 1st and 3rd dorsal compartments); 2021 has left elbow arthroscopy for removal of a cyst. Felt like her complaints weren't being taken seriously so comes to us for initial evaluation.     The patient works for [employer]    Onset of symptoms/DOI was 1 year ago.    Symptoms are aggravated by activity, movement, driving, at night and during the day.    Symptoms are alleviated by nothing    Symptoms consist of pain, swelling and numbness/tingling.    The patient rates their pain as a 8/10.    Attempted treatment(s) and/or interventions include activity modifications, rest, anti-inflammatory medications and immobilization.     The patient denies any fevers, chills, N/V, D/C and presents for evaluation.       Past Medical History:   Diagnosis Date    " Ankle pain     Diabetes mellitus     Low back pain      Past Surgical History:   Procedure Laterality Date    ANKLE SURGERY Left     screw    CHOLECYSTECTOMY      CYSTOSCOPY  7/9/2019    Procedure: CYSTOSCOPY;  Surgeon: Geneva Orr MD;  Location: [facility] OR;  Service: OB/GYN;;    FOOT SURGERY Right     HYSTEROSCOPY WITH DILATION AND CURETTAGE OF UTERUS N/A 3/13/2019    Procedure: HYSTEROSCOPY, WITH DILATION AND CURETTAGE OF UTERUS;  Surgeon: Geneva Orr MD;  Location: [facility] OR;  Service: OB/GYN;  Laterality: N/A;  Myosure    INSERTION OF CONTRACEPTIVE CAPSULE  2012 / 2015    NEXPLANON REMOVED    KNEE SURGERY  01/28/2021    LAPAROSCOPIC SALPINGECTOMY Bilateral 7/9/2019    Procedure: SALPINGECTOMY, LAPAROSCOPIC;  Surgeon: Geneva Orr MD;  Location: [facility] OR;  Service: OB/GYN;  Laterality: Bilateral;    LAPAROSCOPIC TOTAL HYSTERECTOMY N/A 7/9/2019    Procedure: HYSTERECTOMY, TOTAL, LAPAROSCOPIC;  Surgeon: Geneva Orr MD;  Location: [facility] OR;  Service: OB/GYN;  Laterality: N/A;  [place] to assist, no resident needed.    TRANSFORAMINAL EPIDURAL INJECTION OF STEROID Bilateral 3/18/2021    Procedure: LUMBAR TRANSFORAMINAL BILATERAL L3/4 DIRECT REFERRAL;  Surgeon: Jenny Prince MD;  Location: [facility];  Service: Pain Management;  Laterality: Bilateral;  NEEDS CONSENT     Review of patient's allergies indicates:  No Known Allergies  Social History     Social History Narrative    Not on file     Family History   Problem Relation Age of Onset    Stroke Mother     Thyroid cancer Mother     Breast cancer Neg Hx     Ovarian cancer Neg Hx     Colon cancer Neg Hx          Current Outpatient Medications:     adalimumab (HUMIRA PEN) PnKt injection, Inject 1 pen (40 mg total) into the skin every 14 (fourteen) days., Disp: 2 pen, Rfl: 11    aspirin 325 MG tablet, Take 325 mg by mouth once daily., Disp: , Rfl:     bismuth subsalicylate (BISMAREX) 262 mg Chew, Take by mouth., Disp: , Rfl:  "    cholestyramine (QUESTRAN) 4 gram packet, TAKE 4GM PACKET BY MOUTH TWICE A DAY AS DIRECTED BY YOUR PHYSICIAN., Disp: , Rfl:     erenumab-aooe (AIMOVIG) 70 mg/mL autoinjector, INJECT 70MG (ONE AUTOINJECTOR) 70MG/ML SUBCUTANEOUSLY ONCE EVERY MONTH IN THIGH, ABDOMEN, OR UPPER ARM - ALLOW INJECTOR TO COME TO ROOM TEMPERATURE, Disp: , Rfl:     folic acid (FOLVITE) 1 MG tablet, Take 1 tablet (1 mg total) by mouth once daily., Disp: 30 tablet, Rfl: 4    furosemide (LASIX) 20 MG tablet, Take 20 mg by mouth 2 (two) times daily., Disp: , Rfl:     gabapentin (NEURONTIN) 600 MG tablet, Take 1 tablet (600 mg total) by mouth 3 (three) times daily., Disp: 90 tablet, Rfl: 5    metFORMIN (GLUCOPHAGE) 500 MG tablet, Take 500 mg by mouth daily with breakfast., Disp: , Rfl:     methotrexate 2.5 MG Tab, 5 pills weekly, Disp: 25 tablet, Rfl: 5    pantoprazole (PROTONIX) 20 MG tablet, Take 20 mg by mouth once daily., Disp: , Rfl:     sertraline (ZOLOFT) 100 MG tablet, , Disp: , Rfl:     traZODone (DESYREL) 50 MG tablet, Take 1 tablet (50 mg total) by mouth every evening., Disp: 30 tablet, Rfl: 5      Review of Systems:  As per HPI otherwise noncontributory    OBJECTIVE:      Vital Signs (Most Recent):  Vitals:    01/24/22 0917   Weight: 107 kg (236 lb)   Height: 5' 9" (1.753 m)     Body mass index is 34.85 kg/m².      Physical Exam:  Constitutional: The patient appears well-developed and well-nourished. No distress.   Skin: No lesions appreciated  Head: Normocephalic and atraumatic.   Nose: Nose normal.   Ears: No deformities seen  Eyes: Conjunctivae and EOM are normal.   Neck: No tracheal deviation present.   Cardiovascular: Normal rate and intact distal pulses.    Pulmonary/Chest: Effort normal. No respiratory distress.   Abdominal: There is no guarding.   Neurological: The patient is alert.   Psychiatric: The patient has a normal mood and affect.     Bilateral Hand/Wrist " Examination:    Observation/Inspection:  Swelling  Boggy swelling BL dorsal wrists   Deformity  none  Discoloration  none     Scars   Left elbow arthroscopic scars, Right wrist scars over 1st and 3rd dorsal compartments    Atrophy  none    HAND/WRIST EXAMINATION:  Finkelstein's Test   Pos - Left  WHAT Test    Pos - Left  Snuff box tenderness   Neg  Castillo's Test    Neg  Hook of Hamate Tenderness  Neg  CMC grind    Neg  Circumduction test   Neg  TTP over right olecranon    Neurovascular Exam:  Digits WWP, brisk CR < 3s throughout  NVI motor/LTS to M/R/U nerves, radial pulse 2+  Tinel's Test - Carpal Tunnel  Pos - BL  Tinel's Test - Cubital Tunnel  Pos - Right  Phalen's Test    Pos - BL  Median Nerve Compression Test Post - BL    ROM hand full, painless    ROM wrist full - radial sided wrist pain left wrist    ROM elbow full, painless    Abdomen not guarded  Respirations nonlabored  Perfusion intact    Diagnostic Results:     Imaging - I independently viewed the patient's imaging as well as the radiology report.  Xrays of the patient's BL hands demonstrate no evidence of any acute fractures or dislocations or significant degenerative changes.    EMG - EMG has previously been ordered but not complete    ASSESSMENT/PLAN:      46 y.o. yo female with rheumatoid arthritis who has BL carpal tunnel, Left DeQuervain's, and Right cubital tunnel syndrome. Previous right DeQuervain's release by Dr. Rowley and left elbow arthroscopy for a cyst by Dr. Campos    Plan: The patient and I had a thorough discussion today.  We discussed the working diagnosis as well as several other potential alternative diagnoses.  Treatment options were discussed, both conservative and surgical.  Conservative treatment options would include things such as activity modifications, workplace modifications, a period of rest, oral vs topical OTC and prescription anti-inflammatory medications, occupational therapy, splinting/bracing, immobilization,  corticosteroid injections, and others.  Surgical options were discussed as well.     At this time, we will obtain EMG BL upper extremities. Will provide patient with BL carpal tunnel braces.    FU after EMG to discuss results.    Should the patient's symptoms worsen, persist, or fail to improve they should return for reevaluation and I would be happy to see them back anytime.        Jeremie Gates M.D.     Please be aware that this note has been generated with the assistance of [employer] voice-to-text.  Please excuse any spelling or grammatical errors.    Thank you for choosing Dr. Jeremie Gates for your orthopedic hand and upper extremity care. It is our goal to provide you with exceptional care that will help keep you healthy, active, and get you back in the game.     If you felt that you received exemplary care today, please consider leaving feedback for Dr. Gates on [facility] at [email or web address].    Please do not hesitate to reach out to us via email, phone, or MyChart with any questions, concerns, or feedback.

## 2022-02-07 DIAGNOSIS — M05.79 RHEUMATOID ARTHRITIS INVOLVING MULTIPLE SITES WITH POSITIVE RHEUMATOID FACTOR: ICD-10-CM

## 2022-02-08 RX ORDER — GABAPENTIN 600 MG/1
600 TABLET ORAL 3 TIMES DAILY
Qty: 90 TABLET | Refills: 5 | Status: SHIPPED | OUTPATIENT
Start: 2022-02-08 | End: 2022-06-03

## 2022-02-08 RX ORDER — METHOTREXATE 2.5 MG/1
TABLET ORAL
Qty: 25 TABLET | Refills: 5 | Status: SHIPPED | OUTPATIENT
Start: 2022-02-08 | End: 2022-06-03

## 2022-02-08 RX ORDER — ADALIMUMAB 40MG/0.8ML
40 KIT SUBCUTANEOUS
Qty: 2 PEN | Refills: 11 | Status: SHIPPED | OUTPATIENT
Start: 2022-02-08 | End: 2022-06-03

## 2022-02-08 RX ORDER — METHOTREXATE 2.5 MG/1
TABLET ORAL
Qty: 25 TABLET | Refills: 5 | Status: SHIPPED | OUTPATIENT
Start: 2022-02-08 | End: 2022-06-03 | Stop reason: SDUPTHER

## 2022-02-18 ENCOUNTER — PROCEDURE VISIT (OUTPATIENT)
Dept: NEUROLOGY | Facility: CLINIC | Age: 47
End: 2022-02-18
Payer: OTHER GOVERNMENT

## 2022-02-18 DIAGNOSIS — G56.03 BILATERAL CARPAL TUNNEL SYNDROME: ICD-10-CM

## 2022-02-18 PROCEDURE — 95913 PR NERVE CONDUCTION STUDY; 13 OR MORE STUDIES: ICD-10-PCS | Mod: 26,S$PBB,, | Performed by: PSYCHIATRY & NEUROLOGY

## 2022-02-18 PROCEDURE — 95886 MUSC TEST DONE W/N TEST COMP: CPT | Mod: PBBFAC | Performed by: PSYCHIATRY & NEUROLOGY

## 2022-02-18 PROCEDURE — 95913 NRV CNDJ TEST 13/> STUDIES: CPT | Mod: PBBFAC | Performed by: PSYCHIATRY & NEUROLOGY

## 2022-02-18 PROCEDURE — 95886 MUSC TEST DONE W/N TEST COMP: CPT | Mod: 26,S$PBB,, | Performed by: PSYCHIATRY & NEUROLOGY

## 2022-02-18 PROCEDURE — 95913 NRV CNDJ TEST 13/> STUDIES: CPT | Mod: 26,S$PBB,, | Performed by: PSYCHIATRY & NEUROLOGY

## 2022-02-18 PROCEDURE — 95886 PR EMG COMPLETE, W/ NERVE CONDUCTION STUDIES, 5+ MUSCLES: ICD-10-PCS | Mod: 26,S$PBB,, | Performed by: PSYCHIATRY & NEUROLOGY

## 2022-03-04 ENCOUNTER — SPECIALTY PHARMACY (OUTPATIENT)
Dept: PHARMACY | Facility: CLINIC | Age: 47
End: 2022-03-04
Payer: OTHER GOVERNMENT

## 2022-03-04 NOTE — TELEPHONE ENCOUNTER
Humira pen kit     Dose appropriate for RA, negative TB and hepB. CBC and CMP reviewed.      PA already on file  Approved from 12/8/2021 to 1/7/2023     Test claim successful $5 (transitional fill) with manufacture e-voucher      Patient locked into [facility]. Called to inform patient, no answer. LVM informing transfer of Rx to [facility] and if any questions or concerns, to call [facility].     Transfer Rx and close out at [facility].

## 2022-03-08 ENCOUNTER — LAB VISIT (OUTPATIENT)
Dept: LAB | Facility: HOSPITAL | Age: 47
End: 2022-03-08
Attending: INTERNAL MEDICINE
Payer: OTHER GOVERNMENT

## 2022-03-08 DIAGNOSIS — M05.742 RHEUMATOID ARTHRITIS INVOLVING BOTH HANDS WITH POSITIVE RHEUMATOID FACTOR: ICD-10-CM

## 2022-03-08 DIAGNOSIS — M05.741 RHEUMATOID ARTHRITIS INVOLVING BOTH HANDS WITH POSITIVE RHEUMATOID FACTOR: ICD-10-CM

## 2022-03-08 LAB
ALBUMIN SERPL BCP-MCNC: 3.5 G/DL (ref 3.5–5.2)
ALP SERPL-CCNC: 76 U/L (ref 55–135)
ALT SERPL W/O P-5'-P-CCNC: 30 U/L (ref 10–44)
ANION GAP SERPL CALC-SCNC: 9 MMOL/L (ref 8–16)
AST SERPL-CCNC: 22 U/L (ref 10–40)
BASOPHILS # BLD AUTO: 0.03 K/UL (ref 0–0.2)
BASOPHILS NFR BLD: 0.6 % (ref 0–1.9)
BILIRUB SERPL-MCNC: 0.5 MG/DL (ref 0.1–1)
BUN SERPL-MCNC: 12 MG/DL (ref 6–20)
CALCIUM SERPL-MCNC: 8.8 MG/DL (ref 8.7–10.5)
CHLORIDE SERPL-SCNC: 105 MMOL/L (ref 95–110)
CO2 SERPL-SCNC: 24 MMOL/L (ref 23–29)
CREAT SERPL-MCNC: 1 MG/DL (ref 0.5–1.4)
CRP SERPL-MCNC: 2.3 MG/L (ref 0–8.2)
DIFFERENTIAL METHOD: ABNORMAL
EOSINOPHIL # BLD AUTO: 0.3 K/UL (ref 0–0.5)
EOSINOPHIL NFR BLD: 5.2 % (ref 0–8)
ERYTHROCYTE [DISTWIDTH] IN BLOOD BY AUTOMATED COUNT: 14.1 % (ref 11.5–14.5)
ERYTHROCYTE [SEDIMENTATION RATE] IN BLOOD BY WESTERGREN METHOD: 29 MM/HR (ref 0–36)
EST. GFR  (AFRICAN AMERICAN): >60 ML/MIN/1.73 M^2
EST. GFR  (NON AFRICAN AMERICAN): >60 ML/MIN/1.73 M^2
GLUCOSE SERPL-MCNC: 78 MG/DL (ref 70–110)
HCT VFR BLD AUTO: 36.6 % (ref 37–48.5)
HGB BLD-MCNC: 11.5 G/DL (ref 12–16)
IMM GRANULOCYTES # BLD AUTO: 0.01 K/UL (ref 0–0.04)
IMM GRANULOCYTES NFR BLD AUTO: 0.2 % (ref 0–0.5)
LYMPHOCYTES # BLD AUTO: 1.7 K/UL (ref 1–4.8)
LYMPHOCYTES NFR BLD: 34.1 % (ref 18–48)
MCH RBC QN AUTO: 29.9 PG (ref 27–31)
MCHC RBC AUTO-ENTMCNC: 31.4 G/DL (ref 32–36)
MCV RBC AUTO: 95 FL (ref 82–98)
MONOCYTES # BLD AUTO: 0.3 K/UL (ref 0.3–1)
MONOCYTES NFR BLD: 6.5 % (ref 4–15)
NEUTROPHILS # BLD AUTO: 2.7 K/UL (ref 1.8–7.7)
NEUTROPHILS NFR BLD: 53.4 % (ref 38–73)
NRBC BLD-RTO: 0 /100 WBC
PLATELET # BLD AUTO: 265 K/UL (ref 150–450)
PMV BLD AUTO: 9.9 FL (ref 9.2–12.9)
POTASSIUM SERPL-SCNC: 4.2 MMOL/L (ref 3.5–5.1)
PROT SERPL-MCNC: 6.8 G/DL (ref 6–8.4)
RBC # BLD AUTO: 3.85 M/UL (ref 4–5.4)
SODIUM SERPL-SCNC: 138 MMOL/L (ref 136–145)
WBC # BLD AUTO: 5.04 K/UL (ref 3.9–12.7)

## 2022-03-08 PROCEDURE — 86140 C-REACTIVE PROTEIN: CPT | Performed by: INTERNAL MEDICINE

## 2022-03-08 PROCEDURE — 80053 COMPREHEN METABOLIC PANEL: CPT | Performed by: INTERNAL MEDICINE

## 2022-03-08 PROCEDURE — 85025 COMPLETE CBC W/AUTO DIFF WBC: CPT | Performed by: INTERNAL MEDICINE

## 2022-03-08 PROCEDURE — 85652 RBC SED RATE AUTOMATED: CPT | Performed by: INTERNAL MEDICINE

## 2022-03-08 PROCEDURE — 36415 COLL VENOUS BLD VENIPUNCTURE: CPT | Performed by: INTERNAL MEDICINE

## 2022-04-04 ENCOUNTER — OFFICE VISIT (OUTPATIENT)
Dept: ORTHOPEDICS | Facility: CLINIC | Age: 47
End: 2022-04-04
Payer: OTHER GOVERNMENT

## 2022-04-04 DIAGNOSIS — M65.4 DE QUERVAIN'S TENOSYNOVITIS, LEFT: Primary | ICD-10-CM

## 2022-04-04 PROCEDURE — 99999 PR PBB SHADOW E&M-EST. PATIENT-LVL III: CPT | Mod: PBBFAC,,, | Performed by: ORTHOPAEDIC SURGERY

## 2022-04-04 PROCEDURE — 99213 OFFICE O/P EST LOW 20 MIN: CPT | Mod: PBBFAC,25 | Performed by: ORTHOPAEDIC SURGERY

## 2022-04-04 PROCEDURE — 20550 TENDON SHEATH: ICD-10-PCS | Mod: S$PBB,LT,ICN, | Performed by: ORTHOPAEDIC SURGERY

## 2022-04-04 PROCEDURE — 99213 OFFICE O/P EST LOW 20 MIN: CPT | Mod: S$PBB,25,ICN, | Performed by: ORTHOPAEDIC SURGERY

## 2022-04-04 PROCEDURE — 20550 NJX 1 TENDON SHEATH/LIGAMENT: CPT | Mod: PBBFAC,LT | Performed by: ORTHOPAEDIC SURGERY

## 2022-04-04 PROCEDURE — 99999 PR PBB SHADOW E&M-EST. PATIENT-LVL III: ICD-10-PCS | Mod: PBBFAC,,, | Performed by: ORTHOPAEDIC SURGERY

## 2022-04-04 PROCEDURE — 99213 PR OFFICE/OUTPT VISIT, EST, LEVL III, 20-29 MIN: ICD-10-PCS | Mod: S$PBB,25,ICN, | Performed by: ORTHOPAEDIC SURGERY

## 2022-04-04 RX ORDER — DEXAMETHASONE SODIUM PHOSPHATE 4 MG/ML
4 INJECTION, SOLUTION INTRA-ARTICULAR; INTRALESIONAL; INTRAMUSCULAR; INTRAVENOUS; SOFT TISSUE
Status: DISCONTINUED | OUTPATIENT
Start: 2022-04-04 | End: 2022-04-04 | Stop reason: HOSPADM

## 2022-04-04 RX ADMIN — DEXAMETHASONE SODIUM PHOSPHATE 4 MG: 4 INJECTION INTRA-ARTICULAR; INTRALESIONAL; INTRAMUSCULAR; INTRAVENOUS; SOFT TISSUE at 11:04

## 2022-04-04 NOTE — PROGRESS NOTES
"  [facility]  History & Physical  Orthopedics    SUBJECTIVE:      COVID-19 attestation:  This patient was treated during the COVID-19 pandemic.  This was discussed with the patient, they are aware of our current policies and procedures, were given the option of delaying their visit and or switching to a virtual visit, delaying their surgery when applicable, and they elect to proceed.    Chief Complaint: BL hand pain    Referring Provider: No ref. provider found     History of Present Illness:  Patient is a 46 y.o. right hand dominant female who presents today with complaints of BL hand pain. This has been going on for about a year now. She c/o numbness and tingling in her first four fingers BL hands. Says "my carpal tunnel is acting up." She has tried OTC meds and wrist braces with no relief. She has been seeing rheumatology and is now being treated for RA, on plaquenil and MTX and in process of starting humera. EMG has been ordered but not completed. On [facility] ROS, reports is having right elbow pain as well, sometimes numbness in right small finger. Has had previous surgeries BUE - 2020 had right wrist surgery with Dr. Rowley for "release of pressure" (has scars over 1st and 3rd dorsal compartments); 2021 has left elbow arthroscopy for removal of a cyst. Felt like her complaints weren't being taken seriously so comes to us for initial evaluation.     Interval history April 4, 2022:  The patient returns today for re-evaluation.  She follows up today for EMG results as well as re-evaluation.  She notes persistent pain at the left radial styloid.  She is status post several surgeries by outside providers as per above.  We did not have any of these records.  She returns for re-evaluation with no other complaints.    The patient works for [employer]    Onset of symptoms/DOI was 1 year ago.    Symptoms are aggravated by activity, movement, driving, at night and during the day.    Symptoms are alleviated by " nothing    Symptoms consist of pain, swelling and numbness/tingling.    The patient rates their pain as a 8/10.    Attempted treatment(s) and/or interventions include activity modifications, rest, anti-inflammatory medications and immobilization.     The patient denies any fevers, chills, N/V, D/C and presents for evaluation.       Past Medical History:   Diagnosis Date    Ankle pain     Diabetes mellitus     Low back pain      Past Surgical History:   Procedure Laterality Date    ANKLE SURGERY Left     screw    CHOLECYSTECTOMY      CYST REMOVAL Left 2022    CYSTOSCOPY  7/9/2019    Procedure: CYSTOSCOPY;  Surgeon: Geneva Orr MD;  Location: [facility];  Service: OB/GYN;;    FOOT SURGERY Right     HYSTEROSCOPY WITH DILATION AND CURETTAGE OF UTERUS N/A 3/13/2019    Procedure: HYSTEROSCOPY, WITH DILATION AND CURETTAGE OF UTERUS;  Surgeon: Geneva Orr MD;  Location: [facility] OR;  Service: OB/GYN;  Laterality: N/A;  Myosure    INSERTION OF CONTRACEPTIVE CAPSULE  2012 / 2015    NEXPLANON REMOVED    KNEE SURGERY  01/28/2021    LAPAROSCOPIC SALPINGECTOMY Bilateral 7/9/2019    Procedure: SALPINGECTOMY, LAPAROSCOPIC;  Surgeon: Geneva Orr MD;  Location: [facility] OR;  Service: OB/GYN;  Laterality: Bilateral;    LAPAROSCOPIC TOTAL HYSTERECTOMY N/A 7/9/2019    Procedure: HYSTERECTOMY, TOTAL, LAPAROSCOPIC;  Surgeon: Geneva Orr MD;  Location: [facility];  Service: OB/GYN;  Laterality: N/A;  [place] to assist, no resident needed.    TRANSFORAMINAL EPIDURAL INJECTION OF STEROID Bilateral 3/18/2021    Procedure: LUMBAR TRANSFORAMINAL BILATERAL L3/4 DIRECT REFERRAL;  Surgeon: Jenny Prince MD;  Location: [facility];  Service: Pain Management;  Laterality: Bilateral;  NEEDS CONSENT     Review of patient's allergies indicates:  No Known Allergies  Social History     Social History Narrative    Not on file     Family History   Problem Relation Age of Onset    Stroke Mother     Thyroid cancer Mother      Breast cancer Neg Hx     Ovarian cancer Neg Hx     Colon cancer Neg Hx          Current Outpatient Medications:     aspirin 325 MG tablet, Take 325 mg by mouth once daily., Disp: , Rfl:     bismuth subsalicylate (BISMAREX) 262 mg Chew, Take by mouth., Disp: , Rfl:     cholestyramine (QUESTRAN) 4 gram packet, TAKE 4GM PACKET BY MOUTH TWICE A DAY AS DIRECTED BY YOUR PHYSICIAN., Disp: , Rfl:     erenumab-aooe (AIMOVIG) 70 mg/mL autoinjector, INJECT 70MG (ONE AUTOINJECTOR) 70MG/ML SUBCUTANEOUSLY ONCE EVERY MONTH IN THIGH, ABDOMEN, OR UPPER ARM - ALLOW INJECTOR TO COME TO ROOM TEMPERATURE, Disp: , Rfl:     folic acid (FOLVITE) 1 MG tablet, TAKE 1 TABLET BY MOUTH EVERY DAY, Disp: 90 tablet, Rfl: 1    furosemide (LASIX) 20 MG tablet, Take 20 mg by mouth 2 (two) times daily., Disp: , Rfl:     metFORMIN (GLUCOPHAGE) 500 MG tablet, Take 500 mg by mouth daily with breakfast., Disp: , Rfl:     methotrexate 2.5 MG Tab, 5 pills weekly, Disp: 25 tablet, Rfl: 5    methotrexate 2.5 MG Tab, 5 pills weekly, Disp: 25 tablet, Rfl: 5    pantoprazole (PROTONIX) 20 MG tablet, Take 20 mg by mouth once daily., Disp: , Rfl:     sertraline (ZOLOFT) 100 MG tablet, , Disp: , Rfl:     traZODone (DESYREL) 50 MG tablet, Take 1 tablet (50 mg total) by mouth every evening., Disp: 30 tablet, Rfl: 5    adalimumab (HUMIRA PEN) PnKt injection, Inject 1 pen (40 mg total) into the skin every 14 (fourteen) days., Disp: 2 pen, Rfl: 11    gabapentin (NEURONTIN) 600 MG tablet, Take 1 tablet (600 mg total) by mouth 3 (three) times daily., Disp: 90 tablet, Rfl: 5      Review of Systems:  As per HPI otherwise noncontributory    OBJECTIVE:      Vital Signs (Most Recent):  There were no vitals filed for this visit.  There is no height or weight on file to calculate BMI.      Physical Exam:  Constitutional: The patient appears well-developed and well-nourished. No distress.   Skin: No lesions appreciated  Head: Normocephalic and atraumatic.    Nose: Nose normal.   Ears: No deformities seen  Eyes: Conjunctivae and EOM are normal.   Neck: No tracheal deviation present.   Cardiovascular: Normal rate and intact distal pulses.    Pulmonary/Chest: Effort normal. No respiratory distress.   Abdominal: There is no guarding.   Neurological: The patient is alert.   Psychiatric: The patient has a normal mood and affect.     Bilateral Hand/Wrist Examination:    Observation/Inspection:  Swelling  Boggy swelling BL dorsal wrists   Deformity  none  Discoloration  none     Scars   Left elbow arthroscopic scars, Right wrist scars over 1st and 3rd dorsal compartments    Atrophy  none    HAND/WRIST EXAMINATION:  Finkelstein's Test   Pos - Left  WHAT Test    Pos - Left  Snuff box tenderness   Neg  Castillo's Test    Neg  Hook of Hamate Tenderness  Neg  CMC grind    Neg  Circumduction test   Neg  TTP over right olecranon    Neurovascular Exam:  Digits WWP, brisk CR < 3s throughout  NVI motor/LTS to M/R/U nerves, radial pulse 2+  Tinel's Test - Carpal Tunnel  Pos - BL  Tinel's Test - Cubital Tunnel  Pos - Right  Phalen's Test    Pos - BL  Median Nerve Compression Test Post - BL    ROM hand full, painless    ROM wrist full - radial sided wrist pain left wrist    ROM elbow full, painless    Abdomen not guarded  Respirations nonlabored  Perfusion intact    Diagnostic Results:     Imaging - I independently viewed the patient's imaging as well as the radiology report.  Xrays of the patient's BL hands demonstrate no evidence of any acute fractures or dislocations or significant degenerative changes.    EMG - normal study    ASSESSMENT/PLAN:      46 y.o. yo female with rheumatoid arthritis and left-sided de Quervain tenosynovitis    Plan: The patient and I had a thorough discussion today.  We discussed the working diagnosis as well as several other potential alternative diagnoses.  Treatment options were discussed, both conservative and surgical.  Conservative treatment options would  include things such as activity modifications, workplace modifications, a period of rest, oral vs topical OTC and prescription anti-inflammatory medications, occupational therapy, splinting/bracing, immobilization, corticosteroid injections, and others.  Surgical options were discussed as well.     At this time, her EMG does not demonstrate any evidence of carpal tunnel syndrome.  She certainly does have significantly symptomatic left de Quervain tenosynovitis and for this we would like to attempt a corticosteroid injection into the left 1st dorsal compartment today as well as a left thumb spica brace which she will be fitted for.  Recommend continuing her treatment for rheumatoid arthritis.  Follow-up with me on an as-needed basis or certainly should symptoms worsen persist or fail to improve.    Should the patient's symptoms worsen, persist, or fail to improve they should return for reevaluation and I would be happy to see them back anytime.        Jeremie Gates M.D.     Please be aware that this note has been generated with the assistance of [employer] voice-to-text.  Please excuse any spelling or grammatical errors.    Thank you for choosing Dr. Jeremie Gates for your orthopedic hand and upper extremity care. It is our goal to provide you with exceptional care that will help keep you healthy, active, and get you back in the game.     If you felt that you received exemplary care today, please consider leaving feedback for Dr. Gates on [employer] at [email or web address].    Please do not hesitate to reach out to us via email, phone, or MyChart with any questions, concerns, or feedback.

## 2022-04-04 NOTE — PROCEDURES
Tendon Sheath    Date/Time: 4/4/2022 11:15 AM  Performed by: Jeremie Gates MD  Authorized by: Jeremie Gates MD     Consent Done?:  Yes (Verbal)  Indications:  Pain  Site marked: the procedure site was marked    Timeout: prior to procedure the correct patient, procedure, and site was verified    Prep: patient was prepped and draped in usual sterile fashion      Local anesthesia used?: Yes    Anesthesia method: Topical cold spray used prior to the injection and 1cc 1% plain lidocaine contained in the injectate.  Local anesthetic:  Topical anesthetic  Location:  Wrist  Site:  L first doral compartment  Ultrasonic guidance for needle placement?: No    Needle size:  25 G  Approach:  Radial  Medications:  4 mg dexamethasone 4 mg/mL  Patient tolerance:  Patient tolerated the procedure well with no immediate complications

## 2022-05-25 ENCOUNTER — TELEPHONE (OUTPATIENT)
Dept: RHEUMATOLOGY | Facility: CLINIC | Age: 47
End: 2022-05-25
Payer: OTHER GOVERNMENT

## 2022-05-25 NOTE — TELEPHONE ENCOUNTER
Patient is over due for an appt. Called to schedule an appt. Left voicemail.       Thank you,   Natalia

## 2022-06-03 ENCOUNTER — LAB VISIT (OUTPATIENT)
Dept: LAB | Facility: HOSPITAL | Age: 47
End: 2022-06-03
Attending: INTERNAL MEDICINE
Payer: OTHER GOVERNMENT

## 2022-06-03 ENCOUNTER — TELEPHONE (OUTPATIENT)
Dept: DERMATOLOGY | Facility: CLINIC | Age: 47
End: 2022-06-03
Payer: OTHER GOVERNMENT

## 2022-06-03 ENCOUNTER — OFFICE VISIT (OUTPATIENT)
Dept: RHEUMATOLOGY | Facility: CLINIC | Age: 47
End: 2022-06-03
Payer: OTHER GOVERNMENT

## 2022-06-03 VITALS
HEART RATE: 79 BPM | BODY MASS INDEX: 36.42 KG/M2 | HEIGHT: 68 IN | SYSTOLIC BLOOD PRESSURE: 129 MMHG | WEIGHT: 240.31 LBS | DIASTOLIC BLOOD PRESSURE: 79 MMHG

## 2022-06-03 DIAGNOSIS — R55 SYNCOPE, UNSPECIFIED SYNCOPE TYPE: Primary | ICD-10-CM

## 2022-06-03 DIAGNOSIS — R21 SKIN RASH: ICD-10-CM

## 2022-06-03 DIAGNOSIS — N39.0 URINARY TRACT INFECTION WITH HEMATURIA, SITE UNSPECIFIED: ICD-10-CM

## 2022-06-03 DIAGNOSIS — R31.9 URINARY TRACT INFECTION WITH HEMATURIA, SITE UNSPECIFIED: ICD-10-CM

## 2022-06-03 DIAGNOSIS — M05.79 RHEUMATOID ARTHRITIS INVOLVING MULTIPLE SITES WITH POSITIVE RHEUMATOID FACTOR: ICD-10-CM

## 2022-06-03 LAB
BACTERIA #/AREA URNS AUTO: NORMAL /HPF
BILIRUB UR QL STRIP: NEGATIVE
CLARITY UR REFRACT.AUTO: CLEAR
COLOR UR AUTO: ABNORMAL
GLUCOSE UR QL STRIP: NEGATIVE
HGB UR QL STRIP: NEGATIVE
HYALINE CASTS UR QL AUTO: 1 /LPF
KETONES UR QL STRIP: NEGATIVE
LEUKOCYTE ESTERASE UR QL STRIP: ABNORMAL
MICROSCOPIC COMMENT: NORMAL
NITRITE UR QL STRIP: NEGATIVE
PH UR STRIP: 5 [PH] (ref 5–8)
PROT UR QL STRIP: NEGATIVE
RBC #/AREA URNS AUTO: 3 /HPF (ref 0–4)
SP GR UR STRIP: 1.01 (ref 1–1.03)
SQUAMOUS #/AREA URNS AUTO: 2 /HPF
URN SPEC COLLECT METH UR: ABNORMAL
WBC #/AREA URNS AUTO: 3 /HPF (ref 0–5)

## 2022-06-03 PROCEDURE — 99214 OFFICE O/P EST MOD 30 MIN: CPT | Mod: S$PBB,ICN,, | Performed by: INTERNAL MEDICINE

## 2022-06-03 PROCEDURE — 99214 OFFICE O/P EST MOD 30 MIN: CPT | Mod: PBBFAC | Performed by: INTERNAL MEDICINE

## 2022-06-03 PROCEDURE — 99999 PR PBB SHADOW E&M-EST. PATIENT-LVL IV: CPT | Mod: PBBFAC,,, | Performed by: INTERNAL MEDICINE

## 2022-06-03 PROCEDURE — 99214 PR OFFICE/OUTPT VISIT, EST, LEVL IV, 30-39 MIN: ICD-10-PCS | Mod: S$PBB,ICN,, | Performed by: INTERNAL MEDICINE

## 2022-06-03 PROCEDURE — 81001 URINALYSIS AUTO W/SCOPE: CPT | Performed by: INTERNAL MEDICINE

## 2022-06-03 PROCEDURE — 99999 PR PBB SHADOW E&M-EST. PATIENT-LVL IV: ICD-10-PCS | Mod: PBBFAC,,, | Performed by: INTERNAL MEDICINE

## 2022-06-03 RX ORDER — HYDROXYCHLOROQUINE SULFATE 200 MG/1
200 TABLET, FILM COATED ORAL DAILY
Qty: 60 TABLET | Refills: 5 | Status: SHIPPED | OUTPATIENT
Start: 2022-06-03 | End: 2022-07-03

## 2022-06-03 RX ORDER — HYDROXYCHLOROQUINE SULFATE 200 MG/1
200 TABLET, FILM COATED ORAL 2 TIMES DAILY
COMMUNITY
Start: 2022-05-30 | End: 2022-06-03

## 2022-06-03 RX ORDER — IBUPROFEN 600 MG/1
600 TABLET ORAL
COMMUNITY
Start: 2022-03-17 | End: 2022-06-03

## 2022-06-03 RX ORDER — ACETAMINOPHEN 500 MG
1000 TABLET ORAL
COMMUNITY
Start: 2022-03-17 | End: 2022-08-02

## 2022-06-03 RX ORDER — METHOTREXATE 2.5 MG/1
TABLET ORAL
Qty: 25 TABLET | Refills: 5 | Status: SHIPPED | OUTPATIENT
Start: 2022-06-03 | End: 2023-09-13

## 2022-06-03 RX ORDER — MELOXICAM 15 MG/1
TABLET ORAL
COMMUNITY
End: 2022-06-03

## 2022-06-03 RX ORDER — MIRTAZAPINE 15 MG/1
7.5 TABLET, FILM COATED ORAL
COMMUNITY
Start: 2022-04-14 | End: 2022-06-03

## 2022-06-03 RX ORDER — ONDANSETRON 8 MG/1
TABLET, ORALLY DISINTEGRATING ORAL
COMMUNITY

## 2022-06-03 RX ORDER — CEPHALEXIN 500 MG/1
500 CAPSULE ORAL 2 TIMES DAILY
COMMUNITY
Start: 2022-05-31

## 2022-06-03 RX ORDER — CYCLOBENZAPRINE HCL 10 MG
10 TABLET ORAL 3 TIMES DAILY
Qty: 90 TABLET | Refills: 5 | Status: SHIPPED | OUTPATIENT
Start: 2022-06-03

## 2022-06-03 RX ORDER — RIZATRIPTAN BENZOATE 10 MG/1
10 TABLET ORAL
COMMUNITY
Start: 2021-11-17 | End: 2022-06-03

## 2022-06-03 RX ORDER — ABATACEPT 125 MG/ML
125 INJECTION, SOLUTION SUBCUTANEOUS
Qty: 4 ML | Refills: 11 | OUTPATIENT
Start: 2022-06-03 | End: 2022-06-10 | Stop reason: ALTCHOICE

## 2022-06-03 RX ORDER — MELOXICAM 15 MG/1
15 TABLET ORAL DAILY
Qty: 30 TABLET | Refills: 5 | Status: SHIPPED | OUTPATIENT
Start: 2022-06-03 | End: 2022-07-03

## 2022-06-03 RX ORDER — GABAPENTIN 600 MG/1
600 TABLET ORAL 3 TIMES DAILY
Qty: 90 TABLET | Refills: 5 | Status: SHIPPED | OUTPATIENT
Start: 2022-06-03 | End: 2023-09-13

## 2022-06-03 RX ORDER — FOLIC ACID 1 MG/1
1000 TABLET ORAL DAILY
Qty: 90 TABLET | Refills: 1 | Status: SHIPPED | OUTPATIENT
Start: 2022-06-03 | End: 2022-06-20

## 2022-06-03 RX ORDER — TRAZODONE HYDROCHLORIDE 100 MG/1
100 TABLET ORAL NIGHTLY
Qty: 30 TABLET | Refills: 5 | Status: SHIPPED | OUTPATIENT
Start: 2022-06-03 | End: 2023-09-13

## 2022-06-03 ASSESSMENT — ROUTINE ASSESSMENT OF PATIENT INDEX DATA (RAPID3)
PSYCHOLOGICAL DISTRESS SCORE: 4.4
TOTAL RAPID3 SCORE: 5.89
PATIENT GLOBAL ASSESSMENT SCORE: 8
MDHAQ FUNCTION SCORE: 0.8
PAIN SCORE: 7
FATIGUE SCORE: 10
AM STIFFNESS SCORE: 0, NO

## 2022-06-03 NOTE — TELEPHONE ENCOUNTER
----- Message from Claudia Ortiz MA sent at 6/3/2022 10:06 AM CDT -----    ----- Message -----  From: Darline Rene MA  Sent: 6/3/2022   9:58 AM CDT  To: Maurice Carroll MD, #    Good morning,     Dr Carroll in Rheumatology put in a referral for the patient to be seen for a rash. [facility], so my question is can you see the patient off of Dr Carroll's referral or does it have to go through the [facility]? . If you can schedule please call the patient to do so.     Thank you  HEATHER Gregorio  Rheumatology

## 2022-06-03 NOTE — TELEPHONE ENCOUNTER
----- Message from Paige Christopher sent at 6/3/2022  4:55 PM CDT -----  Regarding: appt  Contact: pt @ [phone]  Pt returning missed call from dermatology to schedule an appt. Please call.

## 2022-06-03 NOTE — PROGRESS NOTES
No chief complaint on file.        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 Facet arthropathy throughout L spine.     MRI cervical ([facility]) demonstrates posterior disc osteophyte complex at C5-C6 resulting in moderate-severe neural foraminal narrowing and mild central canal stenosis. No evidence of myelomalacia or cord edema.    Right shoulder has a chip in one of the bone  Left shoulder has started to hurt,this is not imaged   Right shoulder MRI done outside,results not available    Left elbow tendinitis/cyst in the joint and this was operated  Right elbow imaging done outside and I dont have the results    Medications    Zoloft 100 mg  Trazodone 100 mg   Gabapentin 600 mg tid  Flexeril 10 mg bid    5/2021    We started her on plaquenil 200 mg bid    Current complaints    Thumbs hurt,swell  MCPs,PIPs,DIPs hurt  Hands hurt  She has carpal tunnel syndrome b/l    She is off plaquenil since august 2021  She was lost to follow up    She is off gabapentin-no refills     Right dorsal forearm-swollen  She had severe swelling to a point that she had compartment syndrome-she needed incision and release of tension        12/2021    Current complaints    Thumbs hurt,swell  MCPs,PIPs,DIPs hurt  Hands hurt  She has carpal tunnel syndrome b/l    On exam  B/l dorsal forearms tender and swollen  CRP 9.5  ESR 51  All MCPs,PIPs,wrists thumbs are tender  and swollen    Very high disease activity noted    She is off plaquenil since august 2021  She was lost to follow up    She is off gabapentin-no refills     Right forearm itch and scratch-hypopigmented spots    Hand paresthesias all the time-Told to have carpal tunnel syndrome by a hand surgeon who didn't do EMG/NCS-surgery recommended          6/2022    On  mtx 5 tabs weekly  Folic acid  humira 40 mg every other week  Plaquenil 200 mg bid     Knees hurt,ankles hurt,hands hurt  Swelling- wrists, elbows,shoulders    CTS braces dont help enough  EMS 1/2 Hour      Past history    Knee surgery : Left one partial replacement+ due to trauma in the   Right one needs replacement too,also has had traumatic injury    Left ankle surgery due to ankle trauma and bone spur  Right ankle surgery due to bone spur      Family history    Social history  Not a smoker,alcoholic    Rheumatologic ROS    No skin rashes,malar rash,photosensitivity   No telangiectasias   No calcinosis   No psoriasis   No patchy alopecia   No oral and nasal ulcers   No dry eyes and dry mouth   No pleurisy or any cardiopulmonary complaints   No dysphagia,diplopia and dysphonia and muscle weakness   No n/v/d/c   No acid reflux+   No raynaud's+   No digital ulcers   No cytopenias   No renal issues   No blood clots   No fever,chills,night sweats,weight loss and loss of appetite   No pregnancy losses/pre term deliveries /pregnancy complications   No new onset headaches   No recurrent conjunctivitis or uveitis or scleritis or episcleritis   No chronic or bloody diarrhea with no u colitis or crohn's /inflammatory bowel disease   No vaginal or urethral  d/c/STDs/no ulcers   No unexplained lymphadenopathy,parotitis   No seizures,strokes,psychosis  No sclerodactyly  No puffy hands  No perioral tightness     Review of Systems   Constitutional: Negative for activity change, appetite change, chills, diaphoresis, fatigue, fever and unexpected weight change.   HENT:  Negative for congestion, dental problem, drooling, ear discharge, ear pain, facial swelling, hearing loss, mouth sores, nosebleeds, postnasal drip, rhinorrhea, sinus pressure, sinus pain, sneezing, sore throat, tinnitus, trouble swallowing and voice change.    Eyes: Negative for photophobia, pain, discharge, redness, itching and visual disturbance.   Respiratory: Negative for apnea, cough, choking, chest tightness, shortness of breath, wheezing and stridor.    Cardiovascular: Negative for chest pain, palpitations and leg swelling.   Gastrointestinal: Negative for abdominal distention, abdominal pain, anal bleeding, blood in stool, constipation, diarrhea, nausea, rectal pain and vomiting.   Endocrine: Negative for cold intolerance, heat intolerance, polydipsia, polyphagia and polyuria.   Genitourinary: Negative for decreased urine volume, difficulty urinating, dysuria, enuresis, flank pain, frequency, genital sores, hematuria and urgency.   Musculoskeletal: Negative for arthralgias, back pain, gait problem, joint swelling, myalgias, neck pain and neck stiffness.   Skin: Negative for color change, pallor, rash and wound.   Allergic/Immunologic: Negative for environmental allergies, food allergies and immunocompromised state.   Neurological: Negative for dizziness, tremors, seizures, syncope, facial asymmetry, speech difficulty, weakness, light-headedness, numbness and headaches.   Hematological: Negative for adenopathy. Does not bruise/bleed easily.   Psychiatric/Behavioral: Negative for agitation, behavioral problems, confusion, decreased concentration, dysphoric mood, hallucinations, self-injury, sleep disturbance and suicidal ideas. The patient is not nervous/anxious and is not hyperactive.        MSK Exam    First visit every single joint tender  So couldn't say    Second visit  8 TJ    Third visit    All PIPs,MCPs,wrists,thumbs MCPs/PIPs/DIPs tender  Forearms swollen     4th visit    [facility]  6/3/2022    Widespread pain index  Note the areas which the patient has had pain over the last week:                   Shoulder-girdle, left               Shoulder-girdle, right                         Upper arm left                       Upper arm right                         Lower arm left                       Lower arm right    Hip (buttock, trochanter) left  Hip (buttock, trochanter) right                           Upper leg, left                         Upper leg, right                           Lower leg, left                         Lower leg, right                                     Jaw, left                                   Jaw, right                                        Chest                                  Abdomen                               Upper back                              Lower back                                        Neck  Score will be from 0-19:19/19                                         Symptom severity score  Fatigue 3  Waking Unrefreshed 2  Cognitive Symptoms 2   0 = no problem, 1=slight or mild problem 2= moderate; considerable problems often present and/or at a moderate level, 3 = severe, pervasive, continuous, life disturbing problem  For each of the 3 symptoms, indicate the level of severity over the past week using the Scale.  The symptom severity score is the sum of the severity of the 3 symptoms (fatigue, waking unrefreshed, and cognitive symptoms) plus the number of the following symptoms occurring during the previous 6 months:   Headaches 1  Pain or cramps in the lower abdomen 1  Depression 1  The final score is between 0 and 12 :11/12                                          Criteria  Patient has fibromyalgia if the following 3 conditions are met:  1.  Widespread pain index greater than or equal to 7 and symptom severity score greater than or equal to 5 or widespread pain index between 3- 6, and symptom severity score greater than or equal to 9.    2.  Symptoms have  been present in a similar level for at least 3 months  3.  The patient does not have a disorder that would otherwise sufficiently explain the pain      Physical Exam   Constitutional: She is oriented to person, place, and time. No distress.   HENT:   Head: Normocephalic.   Mouth/Throat: Oropharynx is clear and moist.   Eyes: Pupils are equal, round, and reactive to light. Conjunctivae are normal. Right eye exhibits no discharge. Left eye exhibits no discharge. No scleral icterus.   Neck: No thyromegaly present.   Cardiovascular: Normal rate, regular rhythm and normal heart sounds.   Pulmonary/Chest: Effort normal and breath sounds normal. No stridor.   Abdominal: Soft. Bowel sounds are normal.   Musculoskeletal:         General: Normal range of motion.      Cervical back: Normal range of motion.   Lymphadenopathy:     She has no cervical adenopathy.   Neurological: She is alert and oriented to person, place, and time.   Skin: Skin is warm. No rash noted. She is not diaphoretic.   Psychiatric: Affect and judgment normal.       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 Facet arthropathy throughout L spine.    C spine has pinched nerve   She has posterior disc osteophyte complex at C5-C6 resulting in moderate-severe neural foraminal narrowing  and mild central canal stenosis. No evidence of myelomalacia or cord edema.    Right shoulder has a chip in one of the bone,this was operated   She had rotator cuff surgery for a tear  Left shoulder has started to hurt,this is not imaged     Left elbow tendinitis/cyst in the joint needed surgery  Right elbow hurts,this is imaged and is normal it seems     Knee surgery : Left one partial replacement+ due to trauma in the   Right one needs replacement too,also has had traumatic injury    Left ankle surgery due to ankle trauma and bone spur  Right ankle surgery due to bone spur,she has a screw in this ankle     Medications    Zoloft 100 mg  Trazodone 100 mg   Gabapentin 600 mg tid  Flexeril 10 mg bid    She has elevated inflammatory markers  She has high titre RF  So now treating for rheumatoid arthritis        6/2022    On  mtx 5 tabs weekly  Folic acid  humira 40 mg every other week  Plaquenil 200 mg bid     Knees hurt,ankles hurt,hands hurt  Swelling- wrists, elbows,shoulders    CTS braces dont help enough  EMS 1/2 Hour    5/30-she went in to the ER  Syncope  Heart checked to be ok  No cause identified    CDAI 32 today      1. Syncope, unspecified syncope type    2. Skin rash    3. Rheumatoid arthritis involving multiple sites with positive rheumatoid factor    4. Urinary tract infection with hematuria, site unspecified        Reviewed labs/xrays  Reviewed medications      f/u problem     Plan    One episode of syncope  Suggested to refer to neurology,cardiology    She has pain management already  On gabapentin 600 mg tid  Off zoloft 100 mg   Trazodone 100 mg  Off remeron 15 mg   mobic 15 mg daily   Flexeril 10 mg tid    On   mtx 5 tabs weekly  Folic acid  humira 40 mg every other week  Plaquenil 200 mg bid     D/c Humira    Offer orencia 125 mg/ml    Derm referral  Sunscreen use recommended    Rpt CBC,CMP,ESR,CRP next time    EMG/NCS B/L hands-r/o CTS-first and then  Hand surgery-so that they know where to  release the nerve     Diagnoses and all orders for this visit:    Syncope, unspecified syncope type  -     Ambulatory referral/consult to Neurology; Future  -     Ambulatory referral/consult to Cardiology; Future    Skin rash  -     Ambulatory referral/consult to Dermatology; Future    Rheumatoid arthritis involving multiple sites with positive rheumatoid factor  -     CBC Auto Differential; Future  -     Comprehensive Metabolic Panel; Future  -     Sedimentation rate; Future  -     C-Reactive Protein; Future    Urinary tract infection with hematuria, site unspecified  -     Urinalysis; Future  -     CULTURE, URINE    Other orders  -     abatacept (ORENCIA CLICKJECT) 125 mg/mL AtIn; Inject 125 mg into the skin every 7 days.  -     methotrexate 2.5 MG Tab; 5 pills weekly  -     folic acid (FOLVITE) 1 MG tablet; Take 1 tablet (1,000 mcg total) by mouth once daily.  -     hydrOXYchloroQUINE (PLAQUENIL) 200 mg tablet; Take 1 tablet (200 mg total) by mouth once daily.  -     gabapentin (NEURONTIN) 600 MG tablet; Take 1 tablet (600 mg total) by mouth 3 (three) times daily.  -     cyclobenzaprine (FLEXERIL) 10 MG tablet; Take 1 tablet (10 mg total) by mouth 3 (three) times daily.  -     meloxicam (MOBIC) 15 MG tablet; Take 1 tablet (15 mg total) by mouth once daily.  -     traZODone (DESYREL) 100 MG tablet; Take 1 tablet (100 mg total) by mouth every evening.

## 2022-06-06 ENCOUNTER — DOCUMENTATION ONLY (OUTPATIENT)
Dept: RHEUMATOLOGY | Facility: CLINIC | Age: 47
End: 2022-06-06
Payer: OTHER GOVERNMENT

## 2022-06-06 ENCOUNTER — PATIENT MESSAGE (OUTPATIENT)
Dept: RHEUMATOLOGY | Facility: CLINIC | Age: 47
End: 2022-06-06
Payer: OTHER GOVERNMENT

## 2022-06-06 ENCOUNTER — SPECIALTY PHARMACY (OUTPATIENT)
Dept: PHARMACY | Facility: CLINIC | Age: 47
End: 2022-06-06
Payer: OTHER GOVERNMENT

## 2022-06-06 NOTE — TELEPHONE ENCOUNTER
Babar Griffin, this is Fanny Guido with [facility].  We are working on your prescription that your doctor has sent us. We will be working with your insurance to get this approved for you. We will be calling you along the way with updates on your medication.  If you have any questions, you can reach us at [phone].    Welcome call outcome: Left voicemail     Spoke with insurance rep, a prior auth can not be done for Orencia because it is non formulary, the patient would have to try and fail 2 biologics that are on formulary(with a PA) before an exception form can be done for Orencia or any other non formulary biologic.     Messaging doctor for next steps

## 2022-06-06 NOTE — PROGRESS NOTES
Note from pharmacy    Good morning, unfortunately I spoke with an insurance rep with Ms. Chew's insurance provider and a prior auth can not be done for Orencia because it is non formulary, the patient would have to try and fail 2 biologics that are on formulary(with a PA) before an exception form can be done for Orencia or any other non formulary biologic. Humira is on formulary so that counts for one that she has tried. The other biologics that are on formulary with a prior auth are Enbrel, Actemra and Keneret. Would one of these be appropriate for Ms. Chew?

## 2022-06-23 ENCOUNTER — TELEPHONE (OUTPATIENT)
Dept: NEUROLOGY | Facility: CLINIC | Age: 47
End: 2022-06-23

## 2022-06-23 NOTE — TELEPHONE ENCOUNTER
----- Message from Darline Rene MA sent at 6/3/2022  9:57 AM CDT -----  Good morning,     Dr Carroll in Rheumatology put in a referral for the patient to be seen for syncopy. [facility], so my question is can you see the patient off of Dr Carroll's referral or does it have to go through the [facility]? Please let me know as the patient was in the ED and is very concerned this could be something serious. If you can schedule please call the patient to do so.     Thank you  HEATHER Gregorio  Rheumatology

## 2022-08-02 ENCOUNTER — OFFICE VISIT (OUTPATIENT)
Dept: OBSTETRICS AND GYNECOLOGY | Facility: CLINIC | Age: 47
End: 2022-08-02
Payer: OTHER GOVERNMENT

## 2022-08-02 VITALS
DIASTOLIC BLOOD PRESSURE: 87 MMHG | BODY MASS INDEX: 35.97 KG/M2 | SYSTOLIC BLOOD PRESSURE: 141 MMHG | HEIGHT: 68 IN | WEIGHT: 237.31 LBS

## 2022-08-02 DIAGNOSIS — B37.2 YEAST DERMATITIS: ICD-10-CM

## 2022-08-02 DIAGNOSIS — Z01.419 WELL WOMAN EXAM WITH ROUTINE GYNECOLOGICAL EXAM: Primary | ICD-10-CM

## 2022-08-02 DIAGNOSIS — Z12.39 ENCOUNTER FOR SCREENING FOR MALIGNANT NEOPLASM OF BREAST, UNSPECIFIED SCREENING MODALITY: ICD-10-CM

## 2022-08-02 PROCEDURE — 99396 PR PREVENTIVE VISIT,EST,40-64: ICD-10-PCS | Mod: S$PBB,,, | Performed by: OBSTETRICS & GYNECOLOGY

## 2022-08-02 PROCEDURE — 99213 OFFICE O/P EST LOW 20 MIN: CPT | Mod: PBBFAC,PN | Performed by: OBSTETRICS & GYNECOLOGY

## 2022-08-02 PROCEDURE — 99999 PR PBB SHADOW E&M-EST. PATIENT-LVL III: CPT | Mod: PBBFAC,,, | Performed by: OBSTETRICS & GYNECOLOGY

## 2022-08-02 PROCEDURE — 99999 PR PBB SHADOW E&M-EST. PATIENT-LVL III: ICD-10-PCS | Mod: PBBFAC,,, | Performed by: OBSTETRICS & GYNECOLOGY

## 2022-08-02 PROCEDURE — 99396 PREV VISIT EST AGE 40-64: CPT | Mod: S$PBB,,, | Performed by: OBSTETRICS & GYNECOLOGY

## 2022-08-02 RX ORDER — FAMOTIDINE 40 MG/1
40 TABLET, FILM COATED ORAL DAILY
COMMUNITY
Start: 2022-06-13 | End: 2023-09-13

## 2022-08-02 RX ORDER — NYSTATIN 100000 [USP'U]/G
POWDER TOPICAL 2 TIMES DAILY
Qty: 60 G | Refills: 1 | Status: SHIPPED | OUTPATIENT
Start: 2022-08-02

## 2022-08-02 RX ORDER — ADALIMUMAB 40MG/0.8ML
KIT SUBCUTANEOUS
COMMUNITY
Start: 2022-06-30 | End: 2023-09-13

## 2022-08-02 RX ORDER — HYDROXYCHLOROQUINE SULFATE 200 MG/1
200 TABLET, FILM COATED ORAL 2 TIMES DAILY
COMMUNITY
Start: 2022-07-15 | End: 2023-09-13

## 2022-08-02 NOTE — PROGRESS NOTES
Subjective:       Patient ID: Gloria Chew is a 46 y.o. female.    Chief Complaint:  Well Woman      History of Present Illness  HPI  46 y.o. female  here for annual.     Reports rash under pannus, tender, thinks due to moisture.     She has had a TLH/BS. Reports occasional vasomotor symptoms, manageable.  denies break through bleeding.   denies vaginal itching or irritation.  denies vaginal discharge.    She is sexually active.  She uses no method for contraception.    History of abnormal pap: No.   Last Pap: 10/2018 - NILM, pap no longer indicated s/p hyst  Last [facility] negative per patient ([facility])  Last Colonoscopy:  2019 normal per patient Colonoscopy Dr. Cote  Last DXA: N/A    Family History   Problem Relation Age of Onset    Stroke Mother     Thyroid cancer Mother     Breast cancer Neg Hx     Ovarian cancer Neg Hx     Colon cancer Neg Hx        GYN & OB History  No LMP recorded (lmp unknown). Patient has had a hysterectomy.   Date of Last Pap: No result found    OB History    Para Term  AB Living   2 2       2   SAB IAB Ectopic Multiple Live Births           2      # Outcome Date GA Lbr Dileep/2nd Weight Sex Delivery Anes PTL Lv   2 Para            1 Para                Review of Systems  Review of Systems   Constitutional: Negative for chills, diaphoresis, fatigue and fever.   Respiratory: Negative for cough and shortness of breath.    Cardiovascular: Negative for chest pain and palpitations.   Gastrointestinal: Negative for abdominal pain, constipation, diarrhea, nausea and vomiting.   Genitourinary: Negative for dysmenorrhea, dysuria, frequency, menorrhagia, menstrual problem, pelvic pain, vaginal bleeding, vaginal discharge and vaginal pain.   Musculoskeletal: Negative for back pain and myalgias.   Integumentary:  Negative for rash, acne, breast mass, nipple discharge and breast skin changes.   Neurological: Negative for headaches.   Psychiatric/Behavioral:  Negative for depression. The patient is not nervous/anxious.    Breast: Negative for mass, mastodynia, nipple discharge and skin changes          Objective:    Physical Exam:   Constitutional: She is oriented to person, place, and time. She appears well-developed and well-nourished. No distress.    HENT:   Head: Normocephalic and atraumatic.    Eyes: EOM are normal.    Neck: No thyromegaly present.     Pulmonary/Chest: Effort normal. She exhibits no mass and no tenderness. Right breast exhibits no inverted nipple, no mass, no nipple discharge, no skin change, no tenderness and no swelling. Left breast exhibits no inverted nipple, no mass, no nipple discharge, no skin change, no tenderness and no swelling. Breasts are symmetrical.        Abdominal: Soft. She exhibits no distension and no mass. There is no abdominal tenderness. There is no rebound and no guarding. No hernia.   Erythematous rash lower abdomen/upper mons     Genitourinary:    Genitourinary Comments: Normal external female genitalia. Vagina normal, vaginal cuff normal. Uterus/cervix surgically absent. No adnexal masses palpated.                 Musculoskeletal: Normal range of motion and moves all extremeties.       Neurological: She is alert and oriented to person, place, and time.    Skin: Skin is warm. No rash noted.    Psychiatric: She has a normal mood and affect. Her behavior is normal. Judgment and thought content normal.          Assessment:        1. Well woman exam with routine gynecological exam    2. Encounter for screening for malignant neoplasm of breast, unspecified screening modality    3. Yeast dermatitis              Plan:      Gloria was seen today for annual exam.    Diagnoses and all orders for this visit:    Well woman exam with routine gynecological exam  - Pap guidelines discussed. Pap no longer indicated s/p hysterectomy.   - MMG ordered.   - Cscope up to date.   - Contraception - N/A  - STD screening - declined.  - Yeast  dermatitis - Rx nystatin powder.    Encounter for screening for malignant neoplasm of breast, unspecified screening modality  -     Mammo Digital Screening Bilat w/ [facility]; Future        Orders Placed This Encounter   Procedures    Mammo Digital Screening Bilat w/ Porfirio       No follow-ups on file.

## 2022-09-14 ENCOUNTER — PATIENT MESSAGE (OUTPATIENT)
Dept: OBSTETRICS AND GYNECOLOGY | Facility: CLINIC | Age: 47
End: 2022-09-14
Payer: OTHER GOVERNMENT

## 2022-11-01 ENCOUNTER — TELEPHONE (OUTPATIENT)
Dept: OBSTETRICS AND GYNECOLOGY | Facility: CLINIC | Age: 47
End: 2022-11-01
Payer: OTHER GOVERNMENT

## 2022-11-01 DIAGNOSIS — Z12.31 ENCOUNTER FOR SCREENING MAMMOGRAM FOR BREAST CANCER: Primary | ICD-10-CM

## 2023-01-31 ENCOUNTER — PATIENT MESSAGE (OUTPATIENT)
Dept: RHEUMATOLOGY | Facility: CLINIC | Age: 48
End: 2023-01-31
Payer: OTHER GOVERNMENT

## 2023-02-06 ENCOUNTER — TELEPHONE (OUTPATIENT)
Dept: RHEUMATOLOGY | Facility: CLINIC | Age: 48
End: 2023-02-06
Payer: OTHER GOVERNMENT

## 2023-02-06 ENCOUNTER — PATIENT MESSAGE (OUTPATIENT)
Dept: RHEUMATOLOGY | Facility: CLINIC | Age: 48
End: 2023-02-06
Payer: OTHER GOVERNMENT

## 2023-02-10 RX ORDER — HYDROXYCHLOROQUINE SULFATE 200 MG/1
TABLET, FILM COATED ORAL
Qty: 60 TABLET | Refills: 5 | OUTPATIENT
Start: 2023-02-10

## 2023-03-15 ENCOUNTER — TELEPHONE (OUTPATIENT)
Dept: RHEUMATOLOGY | Facility: CLINIC | Age: 48
End: 2023-03-15
Payer: OTHER GOVERNMENT

## 2023-03-15 NOTE — TELEPHONE ENCOUNTER
I have attempted without success to contact this patient by phone to return their call.    ----- Message from Car Crooks MA sent at 3/14/2023  3:56 PM CDT -----  Regarding: FW: refills / advice  Contact: self @ [phone]    ----- Message -----  From: Halley Rodriguez  Sent: 3/14/2023   8:16 AM CDT  To: Carly [facility] Staff  Subject: refills / advice                                 Pt would like to speak with Dr Carroll about getting some refills.  Pt says she does not know the names of her medication.  She also called to request an appt but was not able to come in today for the 11:00 available and there is nothing else available at least through July.  Pls call.

## 2023-04-19 ENCOUNTER — PATIENT MESSAGE (OUTPATIENT)
Dept: RESEARCH | Facility: HOSPITAL | Age: 48
End: 2023-04-19
Payer: OTHER GOVERNMENT

## 2023-09-05 ENCOUNTER — PATIENT MESSAGE (OUTPATIENT)
Dept: RHEUMATOLOGY | Facility: CLINIC | Age: 48
End: 2023-09-05
Payer: OTHER GOVERNMENT

## 2023-09-08 ENCOUNTER — OFFICE VISIT (OUTPATIENT)
Dept: OBSTETRICS AND GYNECOLOGY | Facility: CLINIC | Age: 48
End: 2023-09-08
Payer: OTHER GOVERNMENT

## 2023-09-08 VITALS
DIASTOLIC BLOOD PRESSURE: 88 MMHG | HEIGHT: 68 IN | SYSTOLIC BLOOD PRESSURE: 142 MMHG | WEIGHT: 227.75 LBS | BODY MASS INDEX: 34.52 KG/M2

## 2023-09-08 DIAGNOSIS — Z01.419 WELL WOMAN EXAM WITH ROUTINE GYNECOLOGICAL EXAM: Primary | ICD-10-CM

## 2023-09-08 DIAGNOSIS — Z78.0 MENOPAUSE: ICD-10-CM

## 2023-09-08 PROCEDURE — 99396 PR PREVENTIVE VISIT,EST,40-64: ICD-10-PCS | Mod: S$PBB,,, | Performed by: OBSTETRICS & GYNECOLOGY

## 2023-09-08 PROCEDURE — 99396 PREV VISIT EST AGE 40-64: CPT | Mod: S$PBB,,, | Performed by: OBSTETRICS & GYNECOLOGY

## 2023-09-08 PROCEDURE — 99213 OFFICE O/P EST LOW 20 MIN: CPT | Mod: PBBFAC,PN | Performed by: OBSTETRICS & GYNECOLOGY

## 2023-09-08 PROCEDURE — 99999 PR PBB SHADOW E&M-EST. PATIENT-LVL III: CPT | Mod: PBBFAC,,, | Performed by: OBSTETRICS & GYNECOLOGY

## 2023-09-08 PROCEDURE — 99999 PR PBB SHADOW E&M-EST. PATIENT-LVL III: ICD-10-PCS | Mod: PBBFAC,,, | Performed by: OBSTETRICS & GYNECOLOGY

## 2023-09-08 RX ORDER — TRAZODONE HYDROCHLORIDE 100 MG/1
100 TABLET ORAL NIGHTLY
COMMUNITY

## 2023-09-08 RX ORDER — NICOTINE POLACRILEX 2 MG
GUM BUCCAL DAILY
COMMUNITY
Start: 2023-05-05

## 2023-09-08 RX ORDER — AMLODIPINE BESYLATE 10 MG/1
5 TABLET ORAL
COMMUNITY
Start: 2023-05-04

## 2023-09-08 RX ORDER — FLUOXETINE HYDROCHLORIDE 20 MG/1
20 CAPSULE ORAL
COMMUNITY
Start: 2023-08-17

## 2023-09-08 RX ORDER — MELOXICAM 15 MG/1
15 TABLET ORAL DAILY
COMMUNITY

## 2023-09-08 RX ORDER — ESTRADIOL 0.03 MG/D
1 FILM, EXTENDED RELEASE TRANSDERMAL
Qty: 8 PATCH | Refills: 11 | Status: SHIPPED | OUTPATIENT
Start: 2023-09-11 | End: 2023-10-03

## 2023-09-08 RX ORDER — OMEPRAZOLE 40 MG/1
40 CAPSULE, DELAYED RELEASE ORAL 2 TIMES DAILY
COMMUNITY

## 2023-09-08 NOTE — PROGRESS NOTES
Subjective:       Patient ID: Gloria Chew is a 48 y.o. female.    Chief Complaint:  Annual Exam      History of Present Illness  HPI  48 y.o. female  here for annual.     She has had a TLH/BS. Reports increased vasomotor symptoms. History of CHTN, recently had medication adjustment and control has been improved.   denies break through bleeding.   denies vaginal itching or irritation.  denies vaginal discharge.    She is sexually active.  She uses no method for contraception.    History of abnormal pap: No.   Last Pap: 10/2018 - NILM, pap no longer indicated s/p hyst  Last MM2023 - normal per patient at [facility]  Last Colonoscopy:  2019 normal per patient Colonoscopy Dr. Cote  Last DXA: N/A    Family History   Problem Relation Age of Onset    Stroke Mother     Thyroid cancer Mother     Breast cancer Neg Hx     Ovarian cancer Neg Hx     Colon cancer Neg Hx        GYN & OB History  Patient's last menstrual period was 2007 (approximate).   Date of Last Pap: No result found    OB History    Para Term  AB Living   2 2       2   SAB IAB Ectopic Multiple Live Births           2      # Outcome Date GA Lbr Dileep/2nd Weight Sex Delivery Anes PTL Lv   2 Para            1 Para                Review of Systems  Review of Systems   Constitutional:  Negative for chills, diaphoresis, fatigue and fever.   Respiratory:  Negative for cough and shortness of breath.    Cardiovascular:  Negative for chest pain and palpitations.   Gastrointestinal:  Negative for abdominal pain, constipation, diarrhea, nausea and vomiting.   Genitourinary:  Negative for dysmenorrhea, dysuria, frequency, menorrhagia, menstrual problem, pelvic pain, vaginal bleeding, vaginal discharge and vaginal pain.   Musculoskeletal:  Negative for back pain and myalgias.   Integumentary:  Negative for rash, acne, breast mass, nipple discharge and breast skin changes.   Neurological:  Negative for headaches.   Psychiatric/Behavioral:   Negative for depression. The patient is not nervous/anxious.    Breast: Negative for mass, mastodynia, nipple discharge and skin changes          Objective:    Physical Exam:   Constitutional: She is oriented to person, place, and time. She appears well-developed and well-nourished. No distress.    HENT:   Head: Normocephalic and atraumatic.    Eyes: EOM are normal.    Neck: No thyromegaly present.     Pulmonary/Chest: Effort normal. She exhibits no mass and no tenderness. Right breast exhibits no inverted nipple, no mass, no nipple discharge, no skin change, no tenderness and no swelling. Left breast exhibits no inverted nipple, no mass, no nipple discharge, no skin change, no tenderness and no swelling. Breasts are symmetrical.        Abdominal: Soft. She exhibits no distension and no mass. There is no abdominal tenderness. There is no rebound and no guarding. No hernia.   Erythematous rash lower abdomen/upper mons     Genitourinary:    Genitourinary Comments: Normal external female genitalia. Vagina normal, vaginal cuff normal. Uterus/cervix surgically absent. No adnexal masses palpated.                 Musculoskeletal: Normal range of motion and moves all extremeties.       Neurological: She is alert and oriented to person, place, and time.    Skin: Skin is warm. No rash noted.    Psychiatric: She has a normal mood and affect. Her behavior is normal. Judgment and thought content normal.          Assessment:        1. Well woman exam with routine gynecological exam    2. Menopause              Plan:      Gloria was seen today for annual exam.    Diagnoses and all orders for this visit:    Well woman exam with routine gynecological exam  - Pap guidelines discussed. Pap no longer indicated s/p hysterectomy.   - MMG up to date.   - Cscope up to date.   - Contraception - N/A  - STD screening - declined.  - Menopause symptoms - A full discussion of the benefit-risk ratio of hormonal replacement therapy was carried out.  Improvement in vasomotor and other climacteric symptoms was discussed, including possible improvements in sleep and mood. Reduction of risk for osteoporosis was explained. We discussed the study data showing increased risk of thrombo-embolic events such as myocardial infarction, stroke and also possibly breast cancer with estrogen replacement, and how this might affect her. The range of side effects such as breast tenderness, weight gain and including possible increases in lifetime risk of breast cancer and possible thrombotic complications was discussed. We also discussed ACOG's recommendation to use hormone replacement therapy for the relief of hot flashes alone and to be on the lowest dose possible for the shortest amount of time.  Alternative such as herbal and soy-based products were reviewed. All of her questions about this therapy were answered. Given that HTN controlled, will trial estradiol patch. Discussed risk/benefits and she expressed understanding. F/U in 4-6 weeks to assess treatment.     Encounter for screening for malignant neoplasm of breast, unspecified screening modality  -     Mammo Digital Screening Bilat w/ [facility]; Future        No orders of the defined types were placed in this encounter.      Follow up in about 4 weeks (around 10/6/2023) for followup.

## 2023-09-13 ENCOUNTER — OFFICE VISIT (OUTPATIENT)
Dept: RHEUMATOLOGY | Facility: CLINIC | Age: 48
End: 2023-09-13
Payer: OTHER GOVERNMENT

## 2023-09-13 ENCOUNTER — LAB VISIT (OUTPATIENT)
Dept: LAB | Facility: HOSPITAL | Age: 48
End: 2023-09-13
Attending: INTERNAL MEDICINE
Payer: OTHER GOVERNMENT

## 2023-09-13 VITALS
SYSTOLIC BLOOD PRESSURE: 120 MMHG | HEART RATE: 83 BPM | HEIGHT: 68 IN | WEIGHT: 233.69 LBS | DIASTOLIC BLOOD PRESSURE: 76 MMHG | BODY MASS INDEX: 35.42 KG/M2

## 2023-09-13 DIAGNOSIS — M05.79 RHEUMATOID ARTHRITIS INVOLVING MULTIPLE SITES WITH POSITIVE RHEUMATOID FACTOR: Primary | ICD-10-CM

## 2023-09-13 DIAGNOSIS — G43.809 OTHER MIGRAINE WITHOUT STATUS MIGRAINOSUS, NOT INTRACTABLE: ICD-10-CM

## 2023-09-13 DIAGNOSIS — R55 SYNCOPE, UNSPECIFIED SYNCOPE TYPE: ICD-10-CM

## 2023-09-13 DIAGNOSIS — M05.79 RHEUMATOID ARTHRITIS INVOLVING MULTIPLE SITES WITH POSITIVE RHEUMATOID FACTOR: ICD-10-CM

## 2023-09-13 LAB
ALBUMIN SERPL BCP-MCNC: 3.9 G/DL (ref 3.5–5.2)
ALP SERPL-CCNC: 81 U/L (ref 55–135)
ALT SERPL W/O P-5'-P-CCNC: 19 U/L (ref 10–44)
ANION GAP SERPL CALC-SCNC: 10 MMOL/L (ref 8–16)
AST SERPL-CCNC: 22 U/L (ref 10–40)
BASOPHILS # BLD AUTO: 0.02 K/UL (ref 0–0.2)
BASOPHILS NFR BLD: 0.4 % (ref 0–1.9)
BILIRUB SERPL-MCNC: 0.5 MG/DL (ref 0.1–1)
BUN SERPL-MCNC: 8 MG/DL (ref 6–20)
CALCIUM SERPL-MCNC: 9.3 MG/DL (ref 8.7–10.5)
CHLORIDE SERPL-SCNC: 103 MMOL/L (ref 95–110)
CO2 SERPL-SCNC: 28 MMOL/L (ref 23–29)
CREAT SERPL-MCNC: 0.8 MG/DL (ref 0.5–1.4)
CRP SERPL-MCNC: 12.2 MG/L (ref 0–8.2)
DIFFERENTIAL METHOD: ABNORMAL
EOSINOPHIL # BLD AUTO: 0.2 K/UL (ref 0–0.5)
EOSINOPHIL NFR BLD: 4.3 % (ref 0–8)
ERYTHROCYTE [DISTWIDTH] IN BLOOD BY AUTOMATED COUNT: 12.9 % (ref 11.5–14.5)
ERYTHROCYTE [SEDIMENTATION RATE] IN BLOOD BY PHOTOMETRIC METHOD: 54 MM/HR (ref 0–36)
EST. GFR  (NO RACE VARIABLE): >60 ML/MIN/1.73 M^2
GLUCOSE SERPL-MCNC: 74 MG/DL (ref 70–110)
HAV IGG SER QL IA: NORMAL
HBV CORE AB SERPL QL IA: NORMAL
HBV SURFACE AB SER-ACNC: <3 MIU/ML
HBV SURFACE AB SER-ACNC: NORMAL M[IU]/ML
HBV SURFACE AG SERPL QL IA: NORMAL
HCT VFR BLD AUTO: 36.9 % (ref 37–48.5)
HCV AB SERPL QL IA: NORMAL
HGB BLD-MCNC: 11.6 G/DL (ref 12–16)
HIV 1+2 AB+HIV1 P24 AG SERPL QL IA: NORMAL
IMM GRANULOCYTES # BLD AUTO: 0.02 K/UL (ref 0–0.04)
IMM GRANULOCYTES NFR BLD AUTO: 0.4 % (ref 0–0.5)
LYMPHOCYTES # BLD AUTO: 1.9 K/UL (ref 1–4.8)
LYMPHOCYTES NFR BLD: 33.7 % (ref 18–48)
MCH RBC QN AUTO: 29.8 PG (ref 27–31)
MCHC RBC AUTO-ENTMCNC: 31.4 G/DL (ref 32–36)
MCV RBC AUTO: 95 FL (ref 82–98)
MONOCYTES # BLD AUTO: 0.3 K/UL (ref 0.3–1)
MONOCYTES NFR BLD: 5.8 % (ref 4–15)
NEUTROPHILS # BLD AUTO: 3.1 K/UL (ref 1.8–7.7)
NEUTROPHILS NFR BLD: 55.4 % (ref 38–73)
NRBC BLD-RTO: 0 /100 WBC
PLATELET # BLD AUTO: 324 K/UL (ref 150–450)
PMV BLD AUTO: 9.5 FL (ref 9.2–12.9)
POTASSIUM SERPL-SCNC: 3.5 MMOL/L (ref 3.5–5.1)
PROT SERPL-MCNC: 7.5 G/DL (ref 6–8.4)
RBC # BLD AUTO: 3.89 M/UL (ref 4–5.4)
SODIUM SERPL-SCNC: 141 MMOL/L (ref 136–145)
WBC # BLD AUTO: 5.52 K/UL (ref 3.9–12.7)

## 2023-09-13 PROCEDURE — 36415 COLL VENOUS BLD VENIPUNCTURE: CPT | Performed by: INTERNAL MEDICINE

## 2023-09-13 PROCEDURE — 99214 OFFICE O/P EST MOD 30 MIN: CPT | Mod: S$PBB,,, | Performed by: INTERNAL MEDICINE

## 2023-09-13 PROCEDURE — 86706 HEP B SURFACE ANTIBODY: CPT | Mod: 91 | Performed by: INTERNAL MEDICINE

## 2023-09-13 PROCEDURE — 99213 OFFICE O/P EST LOW 20 MIN: CPT | Mod: PBBFAC | Performed by: INTERNAL MEDICINE

## 2023-09-13 PROCEDURE — 85025 COMPLETE CBC W/AUTO DIFF WBC: CPT | Performed by: INTERNAL MEDICINE

## 2023-09-13 PROCEDURE — 86704 HEP B CORE ANTIBODY TOTAL: CPT | Performed by: INTERNAL MEDICINE

## 2023-09-13 PROCEDURE — 99999 PR PBB SHADOW E&M-EST. PATIENT-LVL III: CPT | Mod: PBBFAC,,, | Performed by: INTERNAL MEDICINE

## 2023-09-13 PROCEDURE — 86803 HEPATITIS C AB TEST: CPT | Performed by: INTERNAL MEDICINE

## 2023-09-13 PROCEDURE — 86592 SYPHILIS TEST NON-TREP QUAL: CPT | Performed by: INTERNAL MEDICINE

## 2023-09-13 PROCEDURE — 86140 C-REACTIVE PROTEIN: CPT | Performed by: INTERNAL MEDICINE

## 2023-09-13 PROCEDURE — 99214 PR OFFICE/OUTPT VISIT, EST, LEVL IV, 30-39 MIN: ICD-10-PCS | Mod: S$PBB,,, | Performed by: INTERNAL MEDICINE

## 2023-09-13 PROCEDURE — 86790 VIRUS ANTIBODY NOS: CPT | Performed by: INTERNAL MEDICINE

## 2023-09-13 PROCEDURE — 86787 VARICELLA-ZOSTER ANTIBODY: CPT | Performed by: INTERNAL MEDICINE

## 2023-09-13 PROCEDURE — 87340 HEPATITIS B SURFACE AG IA: CPT | Performed by: INTERNAL MEDICINE

## 2023-09-13 PROCEDURE — 86682 HELMINTH ANTIBODY: CPT | Performed by: INTERNAL MEDICINE

## 2023-09-13 PROCEDURE — 99999 PR PBB SHADOW E&M-EST. PATIENT-LVL III: ICD-10-PCS | Mod: PBBFAC,,, | Performed by: INTERNAL MEDICINE

## 2023-09-13 PROCEDURE — 80053 COMPREHEN METABOLIC PANEL: CPT | Performed by: INTERNAL MEDICINE

## 2023-09-13 PROCEDURE — 85652 RBC SED RATE AUTOMATED: CPT | Performed by: INTERNAL MEDICINE

## 2023-09-13 PROCEDURE — 87389 HIV-1 AG W/HIV-1&-2 AB AG IA: CPT | Performed by: INTERNAL MEDICINE

## 2023-09-13 RX ORDER — TOCILIZUMAB 180 MG/ML
162 INJECTION, SOLUTION SUBCUTANEOUS
Qty: 4 ML | Refills: 11 | Status: ACTIVE | OUTPATIENT
Start: 2023-09-13

## 2023-09-13 RX ORDER — METHOTREXATE 2.5 MG/1
TABLET ORAL
Qty: 75 TABLET | Refills: 1 | Status: SHIPPED | OUTPATIENT
Start: 2023-09-13

## 2023-09-13 RX ORDER — METHOTREXATE 2.5 MG/1
TABLET ORAL
Qty: 75 TABLET | Refills: 1 | Status: SHIPPED | OUTPATIENT
Start: 2023-09-13 | End: 2023-09-13

## 2023-09-13 RX ORDER — HYDROXYCHLOROQUINE SULFATE 200 MG/1
200 TABLET, FILM COATED ORAL 2 TIMES DAILY
Qty: 180 TABLET | Refills: 1 | Status: SHIPPED | OUTPATIENT
Start: 2023-09-13 | End: 2023-09-13

## 2023-09-13 RX ORDER — FOLIC ACID 1 MG/1
1000 TABLET ORAL DAILY
Qty: 90 TABLET | Refills: 1 | Status: SHIPPED | OUTPATIENT
Start: 2023-09-13 | End: 2023-09-13

## 2023-09-13 RX ORDER — HYDROXYCHLOROQUINE SULFATE 200 MG/1
200 TABLET, FILM COATED ORAL 2 TIMES DAILY
Qty: 180 TABLET | Refills: 1 | Status: SHIPPED | OUTPATIENT
Start: 2023-09-13 | End: 2024-03-11

## 2023-09-13 RX ORDER — FOLIC ACID 1 MG/1
1000 TABLET ORAL DAILY
Qty: 90 TABLET | Refills: 1 | Status: SHIPPED | OUTPATIENT
Start: 2023-09-13 | End: 2024-03-11

## 2023-09-13 NOTE — PROGRESS NOTES
No chief complaint on file.      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 Facet arthropathy throughout L spine.     MRI cervical ([facility]) demonstrates posterior disc osteophyte complex at C5-C6 resulting in moderate-severe neural foraminal narrowing and mild central canal stenosis. No evidence of myelomalacia or cord edema.    Right shoulder has a chip in one of the bone  Left shoulder has started to hurt,this is not imaged   Right shoulder MRI done outside,results not available    Left elbow tendinitis/cyst in the joint and this was operated  Right elbow imaging done outside and I dont have the results    Medications:    Zoloft 100 mg  Trazodone 100 mg   Gabapentin 600 mg tid  Flexeril 10 mg bid    5/2021    We started her on plaquenil 200 mg bid    Current complaints    Thumbs hurt,swell  MCPs,PIPs,DIPs hurt  Hands hurt  She has carpal tunnel syndrome b/l    She is off plaquenil since august 2021  She was lost to follow up    She is off gabapentin-no refills     Right dorsal forearm-swollen  She had severe swelling to a point that she had compartment syndrome-she needed incision and release of tension        12/2021    Current complaints    Thumbs hurt,swell  MCPs,PIPs,DIPs hurt  Hands hurt  She has carpal tunnel syndrome b/l    On exam  B/l dorsal forearms tender and swollen  CRP 9.5  ESR 51  All MCPs,PIPs,wrists thumbs are tender and  swollen    Very high disease activity noted    She is off plaquenil since august 2021  She was lost to follow up    She is off gabapentin-no refills     Right forearm itch and scratch-hypopigmented spots    Hand paresthesias all the time-Told to have carpal tunnel syndrome by a hand surgeon who didn't do EMG/NCS-surgery recommended          6/2022    On  mtx 5 tabs weekly  Folic acid  humira 40 mg every other week  Plaquenil 200 mg bid     Knees hurt,ankles hurt,hands hurt  Swelling- wrists, elbows,shoulders    CTS braces dont help enough  EMS 1/2 Hour    9/2023    Recently went to the ED due to generalized weakness. Dx with heat exhaustion and hypokalemia.   One episode of syncope  Suggested to refer to neurology,cardiology ***     She has pain management already  On gabapentin 600 mg tid  Off zoloft 100 mg   Trazodone 100 mg  Off remeron 15 mg   mobic 15 mg daily   Flexeril 10 mg tid     On   mtx 5 tabs weekly  Folic acid  humira 40 mg every other week BUT WILL DISCONTINUE  Plaquenil 200 mg bid      Added orencia 125 mg/ml***     Derm ***  Sunscreen use recommended    Labs:   CBC hgb 10.9, hct 33.9  CMP  SED rate nml (previously 29, 51, 54)    CRP nml (previously 2.3, 9.5, 8.9)     Past history    Knee surgery : Left one partial replacement+ due to trauma in the   Right one needs replacement too,also has had traumatic injury    Left ankle surgery due to ankle trauma and bone spur  Right ankle surgery due to bone spur      Family history    Social history  Not a smoker,alcoholic    Rheumatologic ROS    No skin rashes,malar rash,photosensitivity   No telangiectasias   No calcinosis   No psoriasis   No patchy alopecia   No oral and nasal ulcers   No dry eyes and dry mouth   No pleurisy or any cardiopulmonary complaints   No dysphagia,diplopia and dysphonia and muscle weakness   No n/v/d/c   No acid reflux+   No raynaud's+   No digital ulcers   No cytopenias   No renal issues   No blood clots   No  fever,chills,night sweats,weight loss and loss of appetite   No pregnancy losses/pre term deliveries /pregnancy complications   No new onset headaches   No recurrent conjunctivitis or uveitis or scleritis or episcleritis   No chronic or bloody diarrhea with no u colitis or crohn's /inflammatory bowel disease   No vaginal or urethral  d/c/STDs/no ulcers   No unexplained lymphadenopathy,parotitis   No seizures,strokes,psychosis  No sclerodactyly  No puffy hands  No perioral tightness     Review of Systems   Constitutional:  Negative for activity change, appetite change, chills, diaphoresis, fatigue, fever and unexpected weight change.   HENT:  Negative for congestion, dental problem, drooling, ear discharge, ear pain, facial swelling, hearing loss, mouth sores, nosebleeds, postnasal drip, rhinorrhea, sinus pressure, sinus pain, sneezing, sore throat, tinnitus, trouble swallowing and voice change.    Eyes:  Negative for photophobia, pain, discharge, redness, itching and visual disturbance.   Respiratory:  Negative for apnea, cough, choking, chest tightness, shortness of breath, wheezing and stridor.    Cardiovascular:  Negative for chest pain, palpitations and leg swelling.   Gastrointestinal:  Negative for abdominal distention, abdominal pain, anal bleeding, blood in stool, constipation, diarrhea, nausea, rectal pain and vomiting.   Endocrine: Negative for cold intolerance, heat intolerance, polydipsia, polyphagia and polyuria.   Genitourinary:  Negative for decreased urine volume, difficulty urinating, dysuria, enuresis, flank pain, frequency, genital sores, hematuria and urgency.   Musculoskeletal:  Negative for arthralgias, back pain, gait problem, joint swelling, myalgias, neck pain and neck stiffness.   Skin:  Negative for color change, pallor, rash and wound.   Allergic/Immunologic: Negative for environmental allergies, food allergies and immunocompromised state.   Neurological:  Negative for dizziness, tremors,  seizures, syncope, facial asymmetry, speech difficulty, weakness, light-headedness, numbness and headaches.   Hematological:  Negative for adenopathy. Does not bruise/bleed easily.   Psychiatric/Behavioral:  Negative for agitation, behavioral problems, confusion, decreased concentration, dysphoric mood, hallucinations, self-injury, sleep disturbance and suicidal ideas. The patient is not nervous/anxious and is not hyperactive.        MSK Exam    First visit every single joint tender  So couldn't say    Second visit  8 TJ    Third visit    All PIPs,MCPs,wrists,thumbs MCPs/PIPs/DIPs tender  Forearms swollen     4th visit    On the homunculus dated 6/3/2022    Widespread pain index  Note the areas which the patient has had pain over the last week:                   Shoulder-girdle, left               Shoulder-girdle, right                         Upper arm left                       Upper arm right                         Lower arm left                       Lower arm right    Hip (buttock, trochanter) left  Hip (buttock, trochanter) right                           Upper leg, left                         Upper leg, right                           Lower leg, left                         Lower leg, right                                     Jaw, left                                   Jaw, right                                        Chest                                  Abdomen                               Upper back                              Lower back                                        Neck  Score will be from 0-19:19/19                                         Symptom severity score  Fatigue 3  Waking Unrefreshed 2  Cognitive Symptoms 2   0 = no problem, 1=slight or mild problem 2= moderate; considerable problems often present and/or at a moderate level, 3 = severe, pervasive, continuous, life disturbing problem  For each of the 3 symptoms, indicate the level of severity over the past week using the Scale.  The  symptom severity score is the sum of the severity of the 3 symptoms (fatigue, waking unrefreshed, and cognitive symptoms) plus the number of the following symptoms occurring during the previous 6 months:   Headaches 1  Pain or cramps in the lower abdomen 1  Depression 1  The final score is between 0 and 12 :11/12                                          Criteria  Patient has fibromyalgia if the following 3 conditions are met:  1.  Widespread pain index greater than or equal to 7 and symptom severity score greater than or equal to 5 or widespread pain index between 3- 6, and symptom severity score greater than or equal to 9.    2.  Symptoms have been present in a similar level for at least 3 months  3.  The patient does not have a disorder that would otherwise sufficiently explain the pain      Physical Exam   Constitutional: She is oriented to person, place, and time. No distress.   HENT:   Head: Normocephalic.   Mouth/Throat: Oropharynx is clear and moist.   Eyes: Pupils are equal, round, and reactive to light. Conjunctivae are normal. Right eye exhibits no discharge. Left eye exhibits no discharge. No scleral icterus.   Neck: No thyromegaly present.   Cardiovascular: Normal rate, regular rhythm and normal heart sounds.   Pulmonary/Chest: Effort normal and breath sounds normal. No stridor.   Abdominal: Soft. Bowel sounds are normal.   Musculoskeletal:         General: Normal range of motion.      Cervical back: Normal range of motion.   Lymphadenopathy:     She has no cervical adenopathy.   Neurological: She is alert and oriented to person, place, and time.   Skin: Skin is warm. No rash noted. She is not diaphoretic.   Psychiatric: Affect and judgment normal.       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 Facet arthropathy throughout L spine.    C spine has pinched nerve   She has posterior disc osteophyte complex at C5-C6 resulting in moderate-severe neural foraminal narrowing and mild central canal stenosis. No evidence of myelomalacia or cord edema.    Right shoulder has a chip in one of the bone,this was operated   She had rotator cuff surgery for a tear  Left shoulder has started to hurt,this is not imaged     Left elbow tendinitis/cyst in the joint needed surgery  Right elbow hurts,this is imaged and is normal it seems     Knee surgery : Left one partial replacement+ due to trauma in the   Right one needs replacement too,also has had traumatic injury    Left ankle surgery due to ankle trauma and bone spur  Right ankle surgery due to bone spur,she has a screw in this ankle     Medications    Zoloft 100 mg  Trazodone 100 mg   Gabapentin 600 mg tid  Flexeril 10 mg bid    She has elevated inflammatory markers  She has high titre RF  So now treating for rheumatoid arthritis        6/2022    On  mtx 5 tabs weekly  Folic acid  humira 40 mg every other week  Plaquenil 200 mg bid     Knees hurt,ankles hurt,hands hurt  Swelling- wrists, elbows,shoulders    CTS braces dont help enough  EMS 1/2 Hour    5/30-she went in to the ER  Syncope  Heart checked to be ok  No cause identified    CDAI 32 today      No diagnosis found.      Reviewed labs/xrays  Reviewed medications      f/u problem     Plan    One episode of syncope  Suggested to refer to  neurology,cardiology    She has pain management already  On gabapentin 600 mg tid  Off zoloft 100 mg   Trazodone 100 mg  Off remeron 15 mg   mobic 15 mg daily   Flexeril 10 mg tid    On   mtx 5 tabs weekly  Folic acid  humira 40 mg every other week  Plaquenil 200 mg bid     D/c Humira    Offer orencia 125 mg/ml    Derm referral  Sunscreen use recommended    Rpt CBC,CMP,ESR,CRP next time    EMG/NCS B/L hands-r/o CTS-first and then  Hand surgery-so that they know where to release the nerve     There are no diagnoses linked to this encounter.

## 2023-09-13 NOTE — PROGRESS NOTES
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 Facet arthropathy throughout L spine.     MRI cervical ([facility]) demonstrates posterior disc osteophyte complex at C5-C6 resulting in moderate-severe neural foraminal narrowing and mild central canal stenosis. No evidence of myelomalacia or cord edema.    Right shoulder has a chip in one of the bone  Left shoulder has started to hurt,this is not imaged   Right shoulder MRI done outside,results not available    Left elbow tendinitis/cyst in the joint and this was operated  Right elbow imaging done outside and I dont have the results    Medications    Zoloft 100 mg  Trazodone 100 mg   Gabapentin 600 mg tid  Flexeril 10 mg bid    5/2021    We started her on plaquenil 200 mg bid    Current complaints    Thumbs hurt,swell  MCPs,PIPs,DIPs hurt  Hands hurt  She has carpal tunnel syndrome b/l    She is off plaquenil since august 2021  She was lost to follow up    She is off gabapentin-no refills     Right dorsal forearm-swollen  She had severe swelling to a point that she had compartment syndrome-she needed incision and release of tension        12/2021    Current complaints    Thumbs hurt,swell  MCPs,PIPs,DIPs hurt  Hands hurt  She has carpal tunnel syndrome b/l    On exam  B/l dorsal forearms tender and swollen  CRP 9.5  ESR 51  All  MCPs,PIPs,wrists thumbs are tender and swollen    Very high disease activity noted    She is off plaquenil since august 2021  She was lost to follow up    She is off gabapentin-no refills     Right forearm itch and scratch-hypopigmented spots    Hand paresthesias all the time-Told to have carpal tunnel syndrome by a hand surgeon who didn't do EMG/NCS-surgery recommended          6/2022    On  mtx 5 tabs weekly  Folic acid  humira 40 mg every other week  Plaquenil 200 mg bid     Knees hurt,ankles hurt,hands hurt  Swelling- wrists, elbows,shoulders    CTS braces dont help enough  EMS 1/2 Hour      9/2023    She has mtx 5 tabs weekly,folic acid daily  Plaquenil 200 mg bid  In June 2022 we d/rachel humira and started orencia, insurance denied orencia,so we offered actemra which she didn't start and she lost to follow up    She has pain management already  Was on gabapentin 600 mg tid- caused severe dry mouth- now off the medications  Off zoloft 100 mg   Trazodone 100 mg  Off remeron 15 mg   mobic 15 mg daily   Flexeril 10 mg tid  Fluoxetine 20 mg    Aimovig- migraines    She still gets syncopes  Neurology and cardiology evaluation not complete yet    8/2023    One hospitalisation for hypotension and hypoxia  She had hypokalemia  GFR had dropped to 48    I saw the d/c note  :  47-year-old  admitted with abdominal pain and weakness after a prolonged period and S excessive heat. Patient had evidence of an acute kidney injury and dehydration she was rigorously hydrated without incident. Of note patient is also noted to be mildly hypokalemic. CT scanning of the abdomen was unrevealing. Patient was rigorously hydrated and her symptoms totally resolved. She was discharged home with instructions to remain hydrated and with potassium supplementation. Her hospital course was otherwise unremarkable    May be all this was dehydration she was told  Labs rpt at the time of d/c  Calcium 8.3  Potassium normalised  GFR 71  H/H  10.9/33.9  Elevated lactate 2.6-2.9          Past history    Knee surgery : Left one partial replacement+ due to trauma in the   Right one needs replacement too,also has had traumatic injury    Left ankle surgery due to ankle trauma and bone spur  Right ankle surgery due to bone spur      Family history    Social history  Not a smoker,alcoholic    Rheumatologic ROS    No skin rashes,malar rash,photosensitivity   No telangiectasias   No calcinosis   No psoriasis   No patchy alopecia   No oral and nasal ulcers   No dry eyes and dry mouth   No pleurisy or any cardiopulmonary complaints   No dysphagia,diplopia and dysphonia and muscle weakness   No n/v/d/c   No acid reflux+   No raynaud's+   No digital ulcers   No cytopenias   No renal issues   No blood clots   No fever,chills,night sweats,weight loss and loss of appetite   No pregnancy losses/pre term deliveries /pregnancy complications   No new onset headaches   No recurrent conjunctivitis or uveitis or scleritis or episcleritis   No chronic or bloody diarrhea with no u colitis or crohn's /inflammatory bowel disease   No vaginal or urethral  d/c/STDs/no ulcers   No unexplained lymphadenopathy,parotitis   No seizures,strokes,psychosis  No sclerodactyly  No puffy hands  No perioral tightness     Review of Systems   Constitutional:  Negative for activity change, appetite change, chills, diaphoresis, fatigue, fever and unexpected weight change.   HENT:  Negative for congestion, dental problem, drooling, ear discharge, ear pain, facial swelling, hearing loss, mouth sores, nosebleeds, postnasal drip, rhinorrhea, sinus pressure, sinus pain, sneezing, sore throat, tinnitus, trouble swallowing and voice change.    Eyes:  Negative for photophobia, pain, discharge, redness, itching and visual disturbance.   Respiratory:  Negative for apnea, cough, choking, chest tightness, shortness of breath, wheezing and stridor.    Cardiovascular:  Negative for chest pain,  palpitations and leg swelling.   Gastrointestinal:  Negative for abdominal distention, abdominal pain, anal bleeding, blood in stool, constipation, diarrhea, nausea, rectal pain and vomiting.   Endocrine: Negative for cold intolerance, heat intolerance, polydipsia, polyphagia and polyuria.   Genitourinary:  Negative for decreased urine volume, difficulty urinating, dysuria, enuresis, flank pain, frequency, genital sores, hematuria and urgency.   Musculoskeletal:  Negative for arthralgias, back pain, gait problem, joint swelling, myalgias, neck pain and neck stiffness.   Skin:  Negative for color change, pallor, rash and wound.   Allergic/Immunologic: Negative for environmental allergies, food allergies and immunocompromised state.   Neurological:  Negative for dizziness, tremors, seizures, syncope, facial asymmetry, speech difficulty, weakness, light-headedness, numbness and headaches.   Hematological:  Negative for adenopathy. Does not bruise/bleed easily.   Psychiatric/Behavioral:  Negative for agitation, behavioral problems, confusion, decreased concentration, dysphoric mood, hallucinations, self-injury, sleep disturbance and suicidal ideas. The patient is not nervous/anxious and is not hyperactive.        MSK Exam    First visit every single joint tender  So couldn't say    Second visit  8 TJ    Third visit    All PIPs,MCPs,wrists,thumbs MCPs/PIPs/DIPs tender  Forearms swollen     4th visit    On the [facility] dated 6/3/2022    Widespread pain index  Note the areas which the patient has had pain over the last week:                   Shoulder-girdle, left               Shoulder-girdle, right                         Upper arm left                       Upper arm right                         Lower arm left                       Lower arm right    Hip (buttock, trochanter) left  Hip (buttock, trochanter) right                           Upper leg, left                         Upper leg, right                            Lower leg, left                         Lower leg, right                                     Jaw, left                                   Jaw, right                                        Chest                                  Abdomen                               Upper back                              Lower back                                        Neck  Score will be from 0-19:19/19                                         Symptom severity score  Fatigue 3  Waking Unrefreshed 2  Cognitive Symptoms 2   0 = no problem, 1=slight or mild problem 2= moderate; considerable problems often present and/or at a moderate level, 3 = severe, pervasive, continuous, life disturbing problem  For each of the 3 symptoms, indicate the level of severity over the past week using the Scale.  The symptom severity score is the sum of the severity of the 3 symptoms (fatigue, waking unrefreshed, and cognitive symptoms) plus the number of the following symptoms occurring during the previous 6 months:   Headaches 1  Pain or cramps in the lower abdomen 1  Depression 1  The final score is between 0 and 12 :11/12                                          Criteria  Patient has fibromyalgia if the following 3 conditions are met:  1.  Widespread pain index greater than or equal to 7 and symptom severity score greater than or equal to 5 or widespread pain index between 3- 6, and symptom severity score greater than or equal to 9.    2.  Symptoms have been present in a similar level for at least 3 months  3.  The patient does not have a disorder that would otherwise sufficiently explain the pain      Physical Exam   Constitutional: She is oriented to person, place, and time. No distress.   HENT:   Head: Normocephalic.   Mouth/Throat: Oropharynx is clear and moist.   Eyes: Pupils are equal, round, and reactive to light. Conjunctivae are normal. Right eye exhibits no discharge. Left eye exhibits no discharge. No scleral icterus.   Neck: No  thyromegaly present.   Cardiovascular: Normal rate, regular rhythm and normal heart sounds.   Pulmonary/Chest: Effort normal and breath sounds normal. No stridor.   Abdominal: Soft. Bowel sounds are normal.   Musculoskeletal:         General: Normal range of motion.      Cervical back: Normal range of motion.   Lymphadenopathy:     She has no cervical adenopathy.   Neurological: She is alert and oriented to person, place, and time.   Skin: Skin is warm. No rash noted. She is not diaphoretic.   Psychiatric: Affect and judgment normal.       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 Facet arthropathy throughout L spine.    C spine has pinched nerve   She has posterior disc osteophyte complex at C5-C6 resulting in moderate-severe neural foraminal narrowing and mild central canal stenosis. No evidence of myelomalacia or cord edema.    Right shoulder has a chip in one of the bone,this was operated   She had rotator cuff surgery for a tear  Left shoulder has started to hurt,this is not imaged     Left elbow tendinitis/cyst in the joint needed surgery  Right elbow hurts,this is imaged and is normal it seems     Knee surgery : Left one partial replacement+ due to trauma in the   Right one needs replacement too,also has had traumatic  injury    Left ankle surgery due to ankle trauma and bone spur  Right ankle surgery due to bone spur,she has a screw in this ankle     Medications    Zoloft 100 mg  Trazodone 100 mg   Gabapentin 600 mg tid  Flexeril 10 mg bid    She has elevated inflammatory markers  She has high titre RF  So now treating for rheumatoid arthritis        6/2022    On  mtx 5 tabs weekly  Folic acid  humira 40 mg every other week  Plaquenil 200 mg bid     Knees hurt,ankles hurt,hands hurt  Swelling- wrists, elbows,shoulders    CTS braces dont help enough  EMS 1/2 Hour    5/30-she went in to the ER  Syncope  Heart checked to be ok  No cause identified    CDAI 32 today        8/2023    One hospitalisation for hypotension and hypoxia  She had hypokalemia  GFR had dropped to 48    I saw the d/c note  :  47-year-old  admitted with abdominal pain and weakness after a prolonged period and S excessive heat. Patient had evidence of an acute kidney injury and dehydration she was rigorously hydrated without incident. Of note patient is also noted to be mildly hypokalemic. CT scanning of the abdomen was unrevealing. Patient was rigorously hydrated and her symptoms totally resolved. She was discharged home with instructions to remain hydrated and with potassium supplementation. Her hospital course was otherwise unremarkable    May be all this was dehydration she was told  Labs rpt at the time of d/c  Calcium 8.3  Potassium normalised  GFR 71  H/H 10.9/33.9  Elevated lactate 2.6-2.9    9/2023    She has mtx 5 tabs weekly,folic acid daily  Plaquenil 200 mg bid  In June 2022 we d/rachel humira and started orencia, insurance denied orencia,so we offered actemra which she didn't start and she lost to follow up    She has pain management already  Was on gabapentin 600 mg tid- caused severe dry mouth- now off the medications  Off zoloft 100 mg   Trazodone 100 mg  Off remeron 15 mg   mobic 15 mg daily   Flexeril 10 mg tid  Fluoxetine 20  mg    Aimovig- migraines    She still gets syncopes  Neurology and cardiology evaluation not complete yet    Left knee seems shifted  She will need a rpt surgery    Left ankle swelling and scar tissue- physical therapy first and surgery next    Right hand carpal tunnel syndrome symptoms persists  She has tingling and numbmness    Right forearm at the site of previous surgery hurts    Right shoulder hurts    Sciatica is an issue    CDAI 30    1. Rheumatoid arthritis involving multiple sites with positive rheumatoid factor    2. Syncope, unspecified syncope type    3. Other migraine without status migrainosus, not intractable          Reviewed labs/xrays  Reviewed medications      f/u problem     Plan    Will rpt labs   CBC,CMP,ESR,CRP  Predmard panel    Will resume consistent use of :  mtx 5 tabs weekly  Folic acid 1 mg daily  Plaquenil 200 mg bid     Offer actemra 162 mg weekly dose not the every other week dose    Left ankle PT    Left knee surgery    Next visit if she has persistence of carpal tunnel symptoms will send to hand surgery    Rtc 3 months      Neurology -syncope and neurology    Gloria was seen today for disease management.    Diagnoses and all orders for this visit:    Rheumatoid arthritis involving multiple sites with positive rheumatoid factor  -     Discontinue: methotrexate 2.5 MG Tab; 5 pills weekly  -     tocilizumab (ACTEMRA ACTPEN) 162 mg/0.9 mL PnIj; Inject 162 mg into the skin every 7 days.  -     Discontinue: methotrexate 2.5 MG Tab; 5 pills weekly  -     Discontinue: methotrexate 2.5 MG Tab; 5 pills weekly  -     methotrexate 2.5 MG Tab; 5 pills weekly  -     CBC Auto Differential; Future  -     Comprehensive Metabolic Panel; Future  -     Sedimentation rate; Future  -     C-Reactive Protein; Future  -     HIV 1/2 Ag/Ab (4th Gen); Future  -     Hepatitis B surface antigen; Future  -     HBcAB; Future  -     Hepatitis B surface antibody; Future  -     Hepatitis C antibody; Future  -      Hepatitis A antibody, IgG; Future  -     Strongyloides IgG Antibodies; Future  -     Quantiferon Gold TB; Future  -     RPR; Future  -     Varicella zoster antibody, IgG; Future    Syncope, unspecified syncope type  -     Ambulatory referral/consult to Neurology; Future    Other migraine without status migrainosus, not intractable  -     Ambulatory referral/consult to Neurology; Future    Other orders  -     Discontinue: folic acid (FOLVITE) 1 MG tablet; Take 1 tablet (1,000 mcg total) by mouth once daily.  -     Discontinue: hydroxychloroquine (PLAQUENIL) 200 mg tablet; Take 1 tablet (200 mg total) by mouth 2 (two) times daily.  -     Discontinue: folic acid (FOLVITE) 1 MG tablet; Take 1 tablet (1,000 mcg total) by mouth once daily.  -     Discontinue: folic acid (FOLVITE) 1 MG tablet; Take 1 tablet (1,000 mcg total) by mouth once daily.  -     hydroxychloroquine (PLAQUENIL) 200 mg tablet; Take 1 tablet (200 mg total) by mouth 2 (two) times daily.  -     folic acid (FOLVITE) 1 MG tablet; Take 1 tablet (1,000 mcg total) by mouth once daily.

## 2023-09-14 LAB
RPR SER QL: NORMAL
VARICELLA INTERPRETATION: POSITIVE
VARICELLA ZOSTER IGG: 2780 AU/ML

## 2023-09-15 ENCOUNTER — PATIENT MESSAGE (OUTPATIENT)
Dept: RHEUMATOLOGY | Facility: CLINIC | Age: 48
End: 2023-09-15
Payer: OTHER GOVERNMENT

## 2023-09-15 LAB — STRONGYLOIDES ANTIBODY IGG: NEGATIVE

## 2023-11-22 ENCOUNTER — TELEPHONE (OUTPATIENT)
Dept: NEUROLOGY | Facility: CLINIC | Age: 48
End: 2023-11-22
Payer: OTHER GOVERNMENT

## 2023-11-22 NOTE — TELEPHONE ENCOUNTER
Called patient but could not leave a message. Recording said that it could not accept calls at this time.

## 2023-12-11 ENCOUNTER — TELEPHONE (OUTPATIENT)
Dept: OBSTETRICS AND GYNECOLOGY | Facility: CLINIC | Age: 48
End: 2023-12-11
Payer: OTHER GOVERNMENT

## 2024-08-14 ENCOUNTER — PATIENT MESSAGE (OUTPATIENT)
Dept: RHEUMATOLOGY | Facility: CLINIC | Age: 49
End: 2024-08-14
Payer: OTHER GOVERNMENT

## 2024-08-14 DIAGNOSIS — B37.2 YEAST DERMATITIS: ICD-10-CM

## 2024-08-15 DIAGNOSIS — M05.79 RHEUMATOID ARTHRITIS INVOLVING MULTIPLE SITES WITH POSITIVE RHEUMATOID FACTOR: ICD-10-CM

## 2024-08-15 RX ORDER — NYSTATIN 100000 [USP'U]/G
POWDER TOPICAL 2 TIMES DAILY
Qty: 60 G | Refills: 1 | Status: SHIPPED | OUTPATIENT
Start: 2024-08-15

## 2024-08-15 RX ORDER — FOLIC ACID 1 MG/1
1000 TABLET ORAL DAILY
Qty: 90 TABLET | Refills: 1 | Status: SHIPPED | OUTPATIENT
Start: 2024-08-15 | End: 2025-02-11

## 2024-08-15 RX ORDER — HYDROXYCHLOROQUINE SULFATE 200 MG/1
200 TABLET, FILM COATED ORAL DAILY
Qty: 60 TABLET | Refills: 0 | Status: SHIPPED | OUTPATIENT
Start: 2024-08-15 | End: 2024-10-14

## 2024-08-15 RX ORDER — METHOTREXATE 2.5 MG/1
TABLET ORAL
Qty: 25 TABLET | Refills: 0 | Status: SHIPPED | OUTPATIENT
Start: 2024-08-15

## 2024-08-15 NOTE — TELEPHONE ENCOUNTER
Refill Routing Note   Medication(s) are not appropriate for processing by [facility] for the following reason(s):        Outside of protocol    [facility] action(s):  Route               Appointments  past 12m or future 3m with PCP    Date Provider   Last Visit   9/8/2023 Geneva Orr MD   Next Visit   Visit date not found Geneva Orr MD   ED visits in past 90 days: 0        Note composed:7:43 AM 08/15/2024

## 2024-08-16 ENCOUNTER — TELEPHONE (OUTPATIENT)
Dept: RHEUMATOLOGY | Facility: CLINIC | Age: 49
End: 2024-08-16
Payer: OTHER GOVERNMENT

## 2024-08-19 ENCOUNTER — TELEPHONE (OUTPATIENT)
Dept: RHEUMATOLOGY | Facility: CLINIC | Age: 49
End: 2024-08-19
Payer: OTHER GOVERNMENT

## 2024-08-21 ENCOUNTER — LAB VISIT (OUTPATIENT)
Dept: LAB | Facility: HOSPITAL | Age: 49
End: 2024-08-21
Attending: INTERNAL MEDICINE

## 2024-08-21 ENCOUNTER — OFFICE VISIT (OUTPATIENT)
Dept: RHEUMATOLOGY | Facility: CLINIC | Age: 49
End: 2024-08-21
Payer: OTHER GOVERNMENT

## 2024-08-21 VITALS
HEIGHT: 68 IN | BODY MASS INDEX: 36.52 KG/M2 | HEART RATE: 87 BPM | SYSTOLIC BLOOD PRESSURE: 130 MMHG | DIASTOLIC BLOOD PRESSURE: 82 MMHG | WEIGHT: 240.94 LBS

## 2024-08-21 DIAGNOSIS — Z13.820 SCREENING FOR OSTEOPOROSIS: ICD-10-CM

## 2024-08-21 DIAGNOSIS — Z79.899 LONG-TERM USE OF PLAQUENIL: ICD-10-CM

## 2024-08-21 DIAGNOSIS — M05.79 RHEUMATOID ARTHRITIS INVOLVING MULTIPLE SITES WITH POSITIVE RHEUMATOID FACTOR: Primary | ICD-10-CM

## 2024-08-21 DIAGNOSIS — M05.79 RHEUMATOID ARTHRITIS INVOLVING MULTIPLE SITES WITH POSITIVE RHEUMATOID FACTOR: ICD-10-CM

## 2024-08-21 LAB
25(OH)D3+25(OH)D2 SERPL-MCNC: 78 NG/ML (ref 30–96)
ALBUMIN SERPL BCP-MCNC: 3.8 G/DL (ref 3.5–5.2)
ALP SERPL-CCNC: 84 U/L (ref 55–135)
ALT SERPL W/O P-5'-P-CCNC: 21 U/L (ref 10–44)
ANION GAP SERPL CALC-SCNC: 7 MMOL/L (ref 8–16)
AST SERPL-CCNC: 23 U/L (ref 10–40)
BASOPHILS # BLD AUTO: 0.03 K/UL (ref 0–0.2)
BASOPHILS NFR BLD: 0.4 % (ref 0–1.9)
BILIRUB SERPL-MCNC: 0.3 MG/DL (ref 0.1–1)
BUN SERPL-MCNC: 9 MG/DL (ref 6–20)
CALCIUM SERPL-MCNC: 9.3 MG/DL (ref 8.7–10.5)
CHLORIDE SERPL-SCNC: 106 MMOL/L (ref 95–110)
CO2 SERPL-SCNC: 26 MMOL/L (ref 23–29)
CREAT SERPL-MCNC: 1 MG/DL (ref 0.5–1.4)
CRP SERPL-MCNC: 7.7 MG/L (ref 0–8.2)
DIFFERENTIAL METHOD BLD: ABNORMAL
EOSINOPHIL # BLD AUTO: 0.3 K/UL (ref 0–0.5)
EOSINOPHIL NFR BLD: 4.2 % (ref 0–8)
ERYTHROCYTE [DISTWIDTH] IN BLOOD BY AUTOMATED COUNT: 13.4 % (ref 11.5–14.5)
ERYTHROCYTE [SEDIMENTATION RATE] IN BLOOD BY PHOTOMETRIC METHOD: 31 MM/HR (ref 0–36)
EST. GFR  (NO RACE VARIABLE): >60 ML/MIN/1.73 M^2
GLUCOSE SERPL-MCNC: 75 MG/DL (ref 70–110)
HBV CORE AB SERPL QL IA: NORMAL
HBV SURFACE AB SER-ACNC: 526.18 MIU/ML
HBV SURFACE AB SER-ACNC: REACTIVE M[IU]/ML
HBV SURFACE AG SERPL QL IA: NORMAL
HCT VFR BLD AUTO: 40.7 % (ref 37–48.5)
HGB BLD-MCNC: 12.7 G/DL (ref 12–16)
IMM GRANULOCYTES # BLD AUTO: 0.02 K/UL (ref 0–0.04)
IMM GRANULOCYTES NFR BLD AUTO: 0.3 % (ref 0–0.5)
LYMPHOCYTES # BLD AUTO: 2.3 K/UL (ref 1–4.8)
LYMPHOCYTES NFR BLD: 32.7 % (ref 18–48)
MCH RBC QN AUTO: 29.1 PG (ref 27–31)
MCHC RBC AUTO-ENTMCNC: 31.2 G/DL (ref 32–36)
MCV RBC AUTO: 93 FL (ref 82–98)
MONOCYTES # BLD AUTO: 0.5 K/UL (ref 0.3–1)
MONOCYTES NFR BLD: 6.4 % (ref 4–15)
NEUTROPHILS # BLD AUTO: 3.9 K/UL (ref 1.8–7.7)
NEUTROPHILS NFR BLD: 56 % (ref 38–73)
NRBC BLD-RTO: 0 /100 WBC
PLATELET # BLD AUTO: 342 K/UL (ref 150–450)
PMV BLD AUTO: 9.5 FL (ref 9.2–12.9)
POTASSIUM SERPL-SCNC: 4 MMOL/L (ref 3.5–5.1)
PROT SERPL-MCNC: 7.8 G/DL (ref 6–8.4)
RBC # BLD AUTO: 4.37 M/UL (ref 4–5.4)
SODIUM SERPL-SCNC: 139 MMOL/L (ref 136–145)
WBC # BLD AUTO: 6.98 K/UL (ref 3.9–12.7)

## 2024-08-21 PROCEDURE — G2211 COMPLEX E/M VISIT ADD ON: HCPCS | Mod: S$PBB,,, | Performed by: INTERNAL MEDICINE

## 2024-08-21 PROCEDURE — 82306 VITAMIN D 25 HYDROXY: CPT | Performed by: INTERNAL MEDICINE

## 2024-08-21 PROCEDURE — 86704 HEP B CORE ANTIBODY TOTAL: CPT | Performed by: INTERNAL MEDICINE

## 2024-08-21 PROCEDURE — 86706 HEP B SURFACE ANTIBODY: CPT | Mod: 91 | Performed by: INTERNAL MEDICINE

## 2024-08-21 PROCEDURE — 99999 PR PBB SHADOW E&M-EST. PATIENT-LVL III: CPT | Mod: PBBFAC,,, | Performed by: INTERNAL MEDICINE

## 2024-08-21 PROCEDURE — 99214 OFFICE O/P EST MOD 30 MIN: CPT | Mod: S$PBB,,, | Performed by: INTERNAL MEDICINE

## 2024-08-21 PROCEDURE — 87340 HEPATITIS B SURFACE AG IA: CPT | Performed by: INTERNAL MEDICINE

## 2024-08-21 PROCEDURE — 85025 COMPLETE CBC W/AUTO DIFF WBC: CPT | Performed by: INTERNAL MEDICINE

## 2024-08-21 PROCEDURE — 36415 COLL VENOUS BLD VENIPUNCTURE: CPT | Performed by: INTERNAL MEDICINE

## 2024-08-21 PROCEDURE — 99213 OFFICE O/P EST LOW 20 MIN: CPT | Mod: PBBFAC | Performed by: INTERNAL MEDICINE

## 2024-08-21 PROCEDURE — 80053 COMPREHEN METABOLIC PANEL: CPT | Performed by: INTERNAL MEDICINE

## 2024-08-21 PROCEDURE — 85652 RBC SED RATE AUTOMATED: CPT | Performed by: INTERNAL MEDICINE

## 2024-08-21 PROCEDURE — 86140 C-REACTIVE PROTEIN: CPT | Performed by: INTERNAL MEDICINE

## 2024-08-21 RX ORDER — HYDROXYCHLOROQUINE SULFATE 200 MG/1
200 TABLET, FILM COATED ORAL 2 TIMES DAILY
Qty: 180 TABLET | Refills: 1 | Status: SHIPPED | OUTPATIENT
Start: 2024-08-21 | End: 2024-08-21

## 2024-08-21 RX ORDER — CHOLECALCIFEROL (VITAMIN D3) 50 MCG
50 TABLET ORAL
COMMUNITY
Start: 2023-08-17

## 2024-08-21 RX ORDER — HYDROXYCHLOROQUINE SULFATE 200 MG/1
200 TABLET, FILM COATED ORAL 2 TIMES DAILY
Qty: 180 TABLET | Refills: 1 | Status: SHIPPED | OUTPATIENT
Start: 2024-08-21 | End: 2025-02-17

## 2024-08-21 RX ORDER — ZOLMITRIPTAN 5 MG/1
SPRAY NASAL
COMMUNITY
Start: 2024-01-18

## 2024-08-21 RX ORDER — TOCILIZUMAB 180 MG/ML
162 INJECTION, SOLUTION SUBCUTANEOUS
Qty: 4 ML | Refills: 11 | Status: SHIPPED | OUTPATIENT
Start: 2024-08-21 | End: 2024-09-20

## 2024-08-21 NOTE — PROGRESS NOTES
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 Facet arthropathy throughout L spine.     MRI cervical ([facility]) demonstrates posterior disc osteophyte complex at C5-C6 resulting in moderate-severe neural foraminal narrowing and mild central canal stenosis. No evidence of myelomalacia or cord edema.    Right shoulder has a chip in one of the bone  Left shoulder has started to hurt,this is not imaged   Right shoulder MRI done outside,results not available    Left elbow tendinitis/cyst in the joint and this was operated  Right elbow imaging done outside and I dont have the results    Medications    Zoloft 100 mg  Trazodone 100 mg   Gabapentin 600 mg tid  Flexeril 10 mg bid    5/2021    We started her on plaquenil 200 mg bid    Current complaints    Thumbs hurt,swell  MCPs,PIPs,DIPs hurt  Hands hurt  She has carpal tunnel syndrome b/l    She is off plaquenil since august 2021  She was lost to follow up    She is off gabapentin-no refills     Right dorsal forearm-swollen  She had severe swelling to a point that she had compartment syndrome-she needed incision and release of tension        12/2021    Current complaints    Thumbs hurt,swell  MCPs,PIPs,DIPs hurt  Hands hurt  She has carpal tunnel syndrome b/l    On exam  B/l dorsal forearms tender and swollen  CRP 9.5  ESR 51  All  MCPs,PIPs,wrists thumbs are tender and swollen    Very high disease activity noted    She is off plaquenil since august 2021  She was lost to follow up    She is off gabapentin-no refills     Right forearm itch and scratch-hypopigmented spots    Hand paresthesias all the time-Told to have carpal tunnel syndrome by a hand surgeon who didn't do EMG/NCS-surgery recommended          6/2022    On  mtx 5 tabs weekly  Folic acid  humira 40 mg every other week  Plaquenil 200 mg bid     Knees hurt,ankles hurt,hands hurt  Swelling- wrists, elbows,shoulders    CTS braces dont help enough  EMS 1/2 Hour      9/2023    She has mtx 5 tabs weekly,folic acid daily  Plaquenil 200 mg bid  In June 2022 we d/rachel humira and started orencia, insurance denied orencia,so we offered actemra which she didn't start and she lost to follow up    She has pain management already  Was on gabapentin 600 mg tid- caused severe dry mouth- now off the medications  Off zoloft 100 mg   Trazodone 100 mg  Off remeron 15 mg   mobic 15 mg daily   Flexeril 10 mg tid  Fluoxetine 20 mg    Aimovig- migraines    She still gets syncopes  Neurology and cardiology evaluation not complete yet    8/2023    One hospitalisation for hypotension and hypoxia  She had hypokalemia  GFR had dropped to 48    I saw the d/c note  :  47-year-old  admitted with abdominal pain and weakness after a prolonged period and S excessive heat. Patient had evidence of an acute kidney injury and dehydration she was rigorously hydrated without incident. Of note patient is also noted to be mildly hypokalemic. CT scanning of the abdomen was unrevealing. Patient was rigorously hydrated and her symptoms totally resolved. She was discharged home with instructions to remain hydrated and with potassium supplementation. Her hospital course was otherwise unremarkable    May be all this was dehydration she was told  Labs rpt at the time of d/c  Calcium 8.3  Potassium normalised  GFR 71  H/H  10.9/33.9  Elevated lactate 2.6-2.9      8/2024    Left knee is bothering and she needs another replacement    Left ankle is bothering due to scarring and neuropathy- she will do physical therapy    Sciatica +    Neck- 2 discs hurt    Past history    Knee surgery : Left one partial replacement+ due to trauma in the   Right one needs replacement too,also has had traumatic injury    Left ankle surgery due to ankle trauma and bone spur  Right ankle surgery due to bone spur      Family history    Social history  Not a smoker,alcoholic    Rheumatologic ROS    No skin rashes,malar rash,photosensitivity   No telangiectasias   No calcinosis   No psoriasis   No patchy alopecia   No oral and nasal ulcers   No dry eyes and dry mouth   No pleurisy or any cardiopulmonary complaints   No dysphagia,diplopia and dysphonia and muscle weakness   No n/v/d/c   No acid reflux+   No raynaud's+   No digital ulcers   No cytopenias   No renal issues   No blood clots   No fever,chills,night sweats,weight loss and loss of appetite   No pregnancy losses/pre term deliveries /pregnancy complications   No new onset headaches   No recurrent conjunctivitis or uveitis or scleritis or episcleritis   No chronic or bloody diarrhea with no u colitis or crohn's /inflammatory bowel disease   No vaginal or urethral  d/c/STDs/no ulcers   No unexplained lymphadenopathy,parotitis   No seizures,strokes,psychosis  No sclerodactyly  No puffy hands  No perioral tightness     Review of Systems   Constitutional:  Negative for activity change, appetite change, chills, diaphoresis, fatigue, fever and unexpected weight change.   HENT:  Negative for congestion, dental problem, drooling, ear discharge, ear pain, facial swelling, hearing loss, mouth sores, nosebleeds, postnasal drip, rhinorrhea, sinus pressure, sinus pain, sneezing, sore throat, tinnitus, trouble swallowing and voice change.    Eyes:  Negative for photophobia, pain, discharge, redness, itching  and visual disturbance.   Respiratory:  Negative for apnea, cough, choking, chest tightness, shortness of breath, wheezing and stridor.    Cardiovascular:  Negative for chest pain, palpitations and leg swelling.   Gastrointestinal:  Negative for abdominal distention, abdominal pain, anal bleeding, blood in stool, constipation, diarrhea, nausea, rectal pain and vomiting.   Endocrine: Negative for cold intolerance, heat intolerance, polydipsia, polyphagia and polyuria.   Genitourinary:  Negative for decreased urine volume, difficulty urinating, dysuria, enuresis, flank pain, frequency, genital sores, hematuria and urgency.   Musculoskeletal:  Negative for arthralgias, back pain, gait problem, joint swelling, myalgias, neck pain and neck stiffness.   Skin:  Negative for color change, pallor, rash and wound.   Allergic/Immunologic: Negative for environmental allergies, food allergies and immunocompromised state.   Neurological:  Negative for dizziness, tremors, seizures, syncope, facial asymmetry, speech difficulty, weakness, light-headedness, numbness and headaches.   Hematological:  Negative for adenopathy. Does not bruise/bleed easily.   Psychiatric/Behavioral:  Negative for agitation, behavioral problems, confusion, decreased concentration, dysphoric mood, hallucinations, self-injury, sleep disturbance and suicidal ideas. The patient is not nervous/anxious and is not hyperactive.        MSK Exam    First visit every single joint tender  So couldn't say    Second visit  8 TJ    Third visit  All PIPs,MCPs,wrists,thumbs MCPs/PIPs/DIPs tender  Forearms swollen     4th visit          Widespread pain index  Note the areas which the patient has had pain over the last week:                   Shoulder-girdle, left               Shoulder-girdle, right                         Upper arm left                       Upper arm right                         Lower arm left                       Lower arm right    Hip (buttock,  trochanter) left  Hip (buttock, trochanter) right                           Upper leg, left                         Upper leg, right                           Lower leg, left                         Lower leg, right                                     Jaw, left                                   Jaw, right                                        Chest                                  Abdomen                               Upper back                              Lower back                                        Neck  Score will be from 0-19:19/19                                         Symptom severity score  Fatigue 3  Waking Unrefreshed 2  Cognitive Symptoms 2   0 = no problem, 1=slight or mild problem 2= moderate; considerable problems often present and/or at a moderate level, 3 = severe, pervasive, continuous, life disturbing problem  For each of the 3 symptoms, indicate the level of severity over the past week using the Scale.  The symptom severity score is the sum of the severity of the 3 symptoms (fatigue, waking unrefreshed, and cognitive symptoms) plus the number of the following symptoms occurring during the previous 6 months:   Headaches 1  Pain or cramps in the lower abdomen 1  Depression 1  The final score is between 0 and 12 :11/12                                          Criteria  Patient has fibromyalgia if the following 3 conditions are met:  1.  Widespread pain index greater than or equal to 7 and symptom severity score greater than or equal to 5 or widespread pain index between 3- 6, and symptom severity score greater than or equal to 9.    2.  Symptoms have been present in a similar level for at least 3 months  3.  The patient does not have a disorder that would otherwise sufficiently explain the pain      Physical Exam   Constitutional: She is oriented to person, place, and time. No distress.   HENT:   Head: Normocephalic.   Mouth/Throat: Oropharynx is clear and moist.   Eyes: Pupils are equal,  round, and reactive to light. Conjunctivae are normal. Right eye exhibits no discharge. Left eye exhibits no discharge. No scleral icterus.   Neck: No thyromegaly present.   Cardiovascular: Normal rate, regular rhythm and normal heart sounds.   Pulmonary/Chest: Effort normal and breath sounds normal. No stridor.   Abdominal: Soft. Bowel sounds are normal.   Musculoskeletal:         General: Normal range of motion.      Cervical back: Normal range of motion.   Lymphadenopathy:     She has no cervical adenopathy.   Neurological: She is alert and oriented to person, place, and time.   Skin: Skin is warm. No rash noted. She is not diaphoretic.   Psychiatric: Affect and judgment normal.       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 Facet arthropathy throughout L spine.    C spine has pinched nerve   She has posterior disc osteophyte complex at C5-C6 resulting in moderate-severe neural foraminal narrowing and mild central canal stenosis. No evidence of myelomalacia or cord edema.    Right shoulder has a chip in one of the bone,this was operated   She had rotator cuff surgery for a tear  Left shoulder has started to hurt,this is not imaged     Left elbow tendinitis/cyst in the joint needed surgery  Right elbow hurts,this is imaged and is normal it  seems     Knee surgery : Left one partial replacement+ due to trauma in the   Right one needs replacement too,also has had traumatic injury    Left ankle surgery due to ankle trauma and bone spur  Right ankle surgery due to bone spur,she has a screw in this ankle     Medications    Zoloft 100 mg  Trazodone 100 mg   Gabapentin 600 mg tid  Flexeril 10 mg bid    She has elevated inflammatory markers  She has high titre RF  So now treating for rheumatoid arthritis        6/2022    On  mtx 5 tabs weekly  Folic acid  humira 40 mg every other week  Plaquenil 200 mg bid     Knees hurt,ankles hurt,hands hurt  Swelling- wrists, elbows,shoulders    CTS braces dont help enough  EMS 1/2 Hour    5/30-she went in to the ER  Syncope  Heart checked to be ok  No cause identified    CDAI 32 today        8/2023    One hospitalisation for hypotension and hypoxia  She had hypokalemia  GFR had dropped to 48    I saw the d/c note  :  47-year-old  admitted with abdominal pain and weakness after a prolonged period and S excessive heat. Patient had evidence of an acute kidney injury and dehydration she was rigorously hydrated without incident. Of note patient is also noted to be mildly hypokalemic. CT scanning of the abdomen was unrevealing. Patient was rigorously hydrated and her symptoms totally resolved. She was discharged home with instructions to remain hydrated and with potassium supplementation. Her hospital course was otherwise unremarkable    May be all this was dehydration she was told  Labs rpt at the time of d/c  Calcium 8.3  Potassium normalised  GFR 71  H/H 10.9/33.9  Elevated lactate 2.6-2.9    9/2023    CRP 12.2  ESR 54    She has mtx 5 tabs weekly,folic acid daily  Plaquenil 200 mg bid  In June 2022 we d/rachel humira and started orencia, insurance denied orencia,so we offered actemra which she didn't start and she lost to follow up    She has pain management already  Was on gabapentin 600 mg tid- caused  severe dry mouth- now off the medications  Off zoloft 100 mg   Trazodone 100 mg  Off remeron 15 mg   mobic 15 mg daily   Flexeril 10 mg tid  Fluoxetine 20 mg    Aimovig- migraines    She still gets syncopes  Neurology and cardiology evaluation not complete yet    Left knee seems shifted  She will need a rpt surgery    Left ankle swelling and scar tissue- physical therapy first and surgery next    Right hand carpal tunnel syndrome symptoms persists  She has tingling and numbmness    Right forearm at the site of previous surgery hurts    Right shoulder hurts    Sciatica is an issue    CDAI 30    8/2024    Left knee is bothering and she needs another replacement  We also know that she had trauma in the     Left ankle is bothering due to scarring and neuropathy- she will do physical therapy  We also know that she had trauma to the ankle and nerve damage and bone spurs    Sciatica +    Neck- 2 discs hurt    She never resumed the mtx   She takes plaquenil 200 mg bid  Doesn't take actemra - she doesn't know why    CDAI 24    She takes  Flexeril 10 mg tid  Fluoxetine 20 mg  Emgality  Meloxicam 15 mg daily  Trazodone 100 mg  Zomig spray    1. Rheumatoid arthritis involving multiple sites with positive rheumatoid factor    2. Long-term use of Plaquenil    3. Screening for osteoporosis            Reviewed labs/xrays  Reviewed medications      f/u problem     Plan    CBC,CMP,ESR,CRP,TB,Hepatitis    Educated the need to start biologics  Started actemra 162 mg weekly   No need to resume mtx  Ok to continue plaquenil    She doesn't do her eye exams regularly  I will send her to [facility]    Then we can continue plaquenil 200 mg bid    Left ankle PT    Left knee surgery    Vaccines -mostly UTD    DEXA,Vit d    Mammogram,[facility]oscopy- [facility]  Lipid panel,Hba1c    Rtc 3 months      Gloria was seen today for disease management.    Diagnoses and all orders for this visit:    Rheumatoid arthritis involving  multiple sites with positive rheumatoid factor  -     tocilizumab (ACTEMRA ACTPEN) 162 mg/0.9 mL PnIj; Inject 162 mg into the skin every 7 days.  -     hydroxychloroquine (PLAQUENIL) 200 mg tablet; Take 1 tablet (200 mg total) by mouth 2 (two) times daily.  -     Ambulatory referral/consult to Ophthalmology; Future  -     CBC Auto Differential; Future  -     Comprehensive Metabolic Panel; Future  -     Sedimentation rate; Future  -     C-Reactive Protein; Future  -     Quantiferon Gold TB; Future  -     Hepatitis B Surface Antigen; Future  -     Hepatitis B Surface Ab, Qualitative; Future  -     Hepatitis B Core Antibody, Total; Future    Long-term use of Plaquenil  -     hydroxychloroquine (PLAQUENIL) 200 mg tablet; Take 1 tablet (200 mg total) by mouth 2 (two) times daily.  -     Ambulatory referral/consult to Ophthalmology; Future    Screening for osteoporosis  -     Vitamin D; Future  -     DXA Bone Density Axial Skeleton 1 or more sites; Future    Other orders  -     Discontinue: hydroxychloroquine (PLAQUENIL) 200 mg tablet; Take 1 tablet (200 mg total) by mouth 2 (two) times daily.

## 2024-08-27 ENCOUNTER — PATIENT MESSAGE (OUTPATIENT)
Dept: OPTOMETRY | Facility: CLINIC | Age: 49
End: 2024-08-27
Payer: OTHER GOVERNMENT

## 2024-10-18 ENCOUNTER — HOSPITAL ENCOUNTER (EMERGENCY)
Facility: OTHER | Age: 49
Discharge: HOME | End: 2024-10-18
Attending: EMERGENCY MEDICINE
Payer: OTHER GOVERNMENT

## 2024-10-18 VITALS
WEIGHT: 230 LBS | SYSTOLIC BLOOD PRESSURE: 146 MMHG | BODY MASS INDEX: 34.86 KG/M2 | DIASTOLIC BLOOD PRESSURE: 84 MMHG | HEART RATE: 75 BPM | RESPIRATION RATE: 20 BRPM | HEIGHT: 68 IN | OXYGEN SATURATION: 100 % | TEMPERATURE: 98 F

## 2024-10-18 DIAGNOSIS — M54.2 NECK PAIN: ICD-10-CM
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
## 2024-10-18 DIAGNOSIS — M25.562 LEFT KNEE PAIN: ICD-10-CM

## 2024-10-18 DIAGNOSIS — M54.50 ACUTE BILATERAL LOW BACK PAIN WITHOUT SCIATICA: ICD-10-CM

## 2024-10-18 DIAGNOSIS — R51.9 ACUTE NONINTRACTABLE HEADACHE, UNSPECIFIED HEADACHE TYPE: ICD-10-CM

## 2024-10-18 PROCEDURE — 25000003 PHARM REV CODE 250

## 2024-10-18 PROCEDURE — 96372 THER/PROPH/DIAG INJ SC/IM: CPT

## 2024-10-18 PROCEDURE — 99285 EMERGENCY DEPT VISIT HI MDM: CPT | Mod: 25

## 2024-10-18 PROCEDURE — 63600175 PHARM REV CODE 636 W HCPCS

## 2024-10-18 RX ORDER — METHOCARBAMOL 100 MG/ML
1000 INJECTION, SOLUTION INTRAMUSCULAR; INTRAVENOUS ONCE
Status: DISCONTINUED | OUTPATIENT
Start: 2024-10-18 | End: 2024-10-18

## 2024-10-18 RX ORDER — CYCLOBENZAPRINE HCL 10 MG
10 TABLET ORAL 3 TIMES DAILY PRN
Qty: 21 TABLET | Refills: 0 | Status: SHIPPED | OUTPATIENT
Start: 2024-10-18 | End: 2024-10-25

## 2024-10-18 RX ORDER — ACETAMINOPHEN 500 MG
1000 TABLET ORAL
Status: COMPLETED | OUTPATIENT
Start: 2024-10-18 | End: 2024-10-18

## 2024-10-18 RX ORDER — LIDOCAINE 50 MG/G
2 PATCH TOPICAL DAILY
Qty: 10 PATCH | Refills: 0 | Status: SHIPPED | OUTPATIENT
Start: 2024-10-18 | End: 2024-10-23

## 2024-10-18 RX ORDER — ORPHENADRINE CITRATE 30 MG/ML
60 INJECTION INTRAMUSCULAR; INTRAVENOUS
Status: COMPLETED | OUTPATIENT
Start: 2024-10-18 | End: 2024-10-18

## 2024-10-18 RX ORDER — KETOROLAC TROMETHAMINE 30 MG/ML
15 INJECTION, SOLUTION INTRAMUSCULAR; INTRAVENOUS
Status: COMPLETED | OUTPATIENT
Start: 2024-10-18 | End: 2024-10-18

## 2024-10-18 RX ORDER — MELOXICAM 15 MG/1
15 TABLET ORAL DAILY
Qty: 21 TABLET | Refills: 0 | Status: SHIPPED | OUTPATIENT
Start: 2024-10-18 | End: 2024-11-08

## 2024-10-18 RX ORDER — DICLOFENAC SODIUM 10 MG/G
2 GEL TOPICAL 2 TIMES DAILY
Qty: 100 G | Refills: 0 | Status: SHIPPED | OUTPATIENT
Start: 2024-10-18 | End: 2024-10-23

## 2024-10-18 RX ADMIN — ORPHENADRINE CITRATE 60 MG: 60 INJECTION INTRAMUSCULAR; INTRAVENOUS at 12:10

## 2024-10-18 RX ADMIN — ACETAMINOPHEN 1000 MG: 500 TABLET, FILM COATED ORAL at 03:10

## 2024-10-18 RX ADMIN — KETOROLAC TROMETHAMINE 15 MG: 30 INJECTION, SOLUTION INTRAMUSCULAR; INTRAVENOUS at 12:10

## 2024-10-18 NOTE — Clinical Note
"Gloria COKER" Naina was seen and treated in our emergency department on 10/18/2024.  She may return to work on 10/21/2024.       If you have any questions or concerns, please don't hesitate to call.      Queta Martinez RN    "
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
## 2024-10-18 NOTE — ED NOTES
Attempted to obtain blood samples. 1x unsuccessful attempt. Pt deemed testing unnecessary and did not want to be stuck again.
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
## 2024-10-18 NOTE — Clinical Note
"Gloria COKER" Naina was seen and treated in our emergency department on 10/18/2024.  She may return to work on 10/22/2024.       If you have any questions or concerns, please don't hesitate to call.      Queta Martinez RN    "

## 2024-12-16 DIAGNOSIS — Z12.31 ENCOUNTER FOR SCREENING MAMMOGRAM FOR MALIGNANT NEOPLASM OF BREAST: Primary | ICD-10-CM

## 2025-02-24 ENCOUNTER — TELEPHONE (OUTPATIENT)
Dept: RADIOLOGY | Facility: HOSPITAL | Age: 50
End: 2025-02-24
Payer: OTHER GOVERNMENT

## (undated) DEVICE — TUBE AQUILEX INFLOW

## (undated) DEVICE — SYR 50ML CATH TIP

## (undated) DEVICE — ENDOSTITCH POLYSORB 0 ES-9

## (undated) DEVICE — Device

## (undated) DEVICE — SEE MEDLINE ITEM 146313

## (undated) DEVICE — TIP RUMI BLUE DISPOSABLE 5/BX

## (undated) DEVICE — ELECTRODE REM PLYHSV RETURN 9

## (undated) DEVICE — SEE MEDLINE ITEM 152622

## (undated) DEVICE — SUT VICRYL+ 27 UR-6 VIOL

## (undated) DEVICE — UNDERGLOVES BIOGEL PI SZ 7 LF

## (undated) DEVICE — STRIP STERI REIN CLSR 1/2X2IN

## (undated) DEVICE — KIT WING PAD POSITIONING

## (undated) DEVICE — SUT MCRYL PLUS 4-0 PS2 27IN

## (undated) DEVICE — SET BASIN 48X48IN 6000ML RING

## (undated) DEVICE — NDL INSUF ULTRA VERESS 120MM

## (undated) DEVICE — SUT EASE CROSSBOW CLSR SYS

## (undated) DEVICE — TROCAR KII FIOS 5MM X 100MM

## (undated) DEVICE — SOL STRL WATER INJ 1000ML BG

## (undated) DEVICE — SET CYSTO IRRIGATION UNIV SPIK

## (undated) DEVICE — TUBE AQUILEX OUTFLOW

## (undated) DEVICE — SYR 10CC LUER LOCK

## (undated) DEVICE — DRESSING LEUKOPLAST FLEX 1X3IN

## (undated) DEVICE — SPONGE LAP 18X18 PREWASHED

## (undated) DEVICE — FOOTSWITCH SUCTION IRRIGATION

## (undated) DEVICE — TIP RUMI KOH-EFFICIENT 3.5

## (undated) DEVICE — GLOVE BIOGEL SKINSENSE PI 6.5

## (undated) DEVICE — CLOSURE SKIN STERI STRIP 1/2X4

## (undated) DEVICE — SCISSOR 5MMX35CM DIRECT DRIVE

## (undated) DEVICE — SOL CLEARIFY VISUALIZATION LAP

## (undated) DEVICE — DRAPE INCISE IOBAN 2 23X17IN

## (undated) DEVICE — SEALER LIGASURE LAP 37CM 5MM

## (undated) DEVICE — SEE MEDLINE ITEM 157117

## (undated) DEVICE — ELECTRODE ELECSURG LAPSCP SPAT

## (undated) DEVICE — SOL 9P NACL IRR PIC IL

## (undated) DEVICE — SOL NS 1000CC

## (undated) DEVICE — SEE MEDLINE ITEM 146296

## (undated) DEVICE — PACK LAPAROSCOPY BAPTIST

## (undated) DEVICE — JELLY SURGILUBE 5GR

## (undated) DEVICE — SUT VICRYL PLUS 0 CT1 36IN

## (undated) DEVICE — SET TRI-LUMEN FILTERED TUBE

## (undated) DEVICE — SEE MEDLINE ITEM 157110

## (undated) DEVICE — TROCAR KII FIOS 11MM X 100MM

## (undated) DEVICE — DRAPE STERI LONG

## (undated) DEVICE — ENDOSTITCH INSTRUMENT